# Patient Record
Sex: FEMALE | Race: BLACK OR AFRICAN AMERICAN | NOT HISPANIC OR LATINO | URBAN - METROPOLITAN AREA
[De-identification: names, ages, dates, MRNs, and addresses within clinical notes are randomized per-mention and may not be internally consistent; named-entity substitution may affect disease eponyms.]

---

## 2017-01-15 ENCOUNTER — INPATIENT (INPATIENT)
Facility: HOSPITAL | Age: 82
LOS: 4 days | Discharge: NURSING, REHAB OR LONG-TERM CARE | End: 2017-01-20
Attending: INTERNAL MEDICINE | Admitting: INTERNAL MEDICINE
Payer: MEDICARE

## 2017-01-15 VITALS
OXYGEN SATURATION: 100 % | TEMPERATURE: 98 F | SYSTOLIC BLOOD PRESSURE: 177 MMHG | HEART RATE: 98 BPM | DIASTOLIC BLOOD PRESSURE: 100 MMHG | RESPIRATION RATE: 16 BRPM

## 2017-01-15 DIAGNOSIS — I15.9 SECONDARY HYPERTENSION, UNSPECIFIED: ICD-10-CM

## 2017-01-15 DIAGNOSIS — Z41.8 ENCOUNTER FOR OTHER PROCEDURES FOR PURPOSES OTHER THAN REMEDYING HEALTH STATE: ICD-10-CM

## 2017-01-15 DIAGNOSIS — E11.9 TYPE 2 DIABETES MELLITUS WITHOUT COMPLICATIONS: ICD-10-CM

## 2017-01-15 DIAGNOSIS — N39.0 URINARY TRACT INFECTION, SITE NOT SPECIFIED: ICD-10-CM

## 2017-01-15 DIAGNOSIS — J40 BRONCHITIS, NOT SPECIFIED AS ACUTE OR CHRONIC: ICD-10-CM
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
## 2017-01-15 DIAGNOSIS — I10 ESSENTIAL (PRIMARY) HYPERTENSION: ICD-10-CM

## 2017-01-15 LAB
ALBUMIN SERPL ELPH-MCNC: 4.3 G/DL — SIGNIFICANT CHANGE UP (ref 3.3–5)
ALP SERPL-CCNC: 75 U/L — SIGNIFICANT CHANGE UP (ref 40–120)
ALT FLD-CCNC: 15 U/L — SIGNIFICANT CHANGE UP (ref 4–33)
APPEARANCE UR: CLEAR — SIGNIFICANT CHANGE UP
APTT BLD: 29.2 SEC — SIGNIFICANT CHANGE UP (ref 27.5–37.4)
AST SERPL-CCNC: 25 U/L — SIGNIFICANT CHANGE UP (ref 4–32)
B PERT DNA SPEC QL NAA+PROBE: SIGNIFICANT CHANGE UP
BASE EXCESS BLDV CALC-SCNC: -0.7 MMOL/L — SIGNIFICANT CHANGE UP
BASE EXCESS BLDV CALC-SCNC: 1.3 MMOL/L — SIGNIFICANT CHANGE UP
BASOPHILS # BLD AUTO: 0.04 K/UL — SIGNIFICANT CHANGE UP (ref 0–0.2)
BASOPHILS NFR BLD AUTO: 0.4 % — SIGNIFICANT CHANGE UP (ref 0–2)
BILIRUB SERPL-MCNC: 0.9 MG/DL — SIGNIFICANT CHANGE UP (ref 0.2–1.2)
BILIRUB UR-MCNC: NEGATIVE — SIGNIFICANT CHANGE UP
BLOOD GAS VENOUS - CREATININE: 0.95 MG/DL — SIGNIFICANT CHANGE UP (ref 0.5–1.3)
BLOOD GAS VENOUS - CREATININE: 1.04 MG/DL — SIGNIFICANT CHANGE UP (ref 0.5–1.3)
BLOOD UR QL VISUAL: NEGATIVE — SIGNIFICANT CHANGE UP
BUN SERPL-MCNC: 11 MG/DL — SIGNIFICANT CHANGE UP (ref 7–23)
C PNEUM DNA SPEC QL NAA+PROBE: NOT DETECTED — SIGNIFICANT CHANGE UP
CALCIUM SERPL-MCNC: 9.8 MG/DL — SIGNIFICANT CHANGE UP (ref 8.4–10.5)
CHLORIDE BLDV-SCNC: 102 MMOL/L — SIGNIFICANT CHANGE UP (ref 96–108)
CHLORIDE BLDV-SCNC: 106 MMOL/L — SIGNIFICANT CHANGE UP (ref 96–108)
CHLORIDE SERPL-SCNC: 98 MMOL/L — SIGNIFICANT CHANGE UP (ref 98–107)
CK MB BLD-MCNC: 1.77 NG/ML — SIGNIFICANT CHANGE UP (ref 1–4.7)
CK SERPL-CCNC: 201 U/L — HIGH (ref 25–170)
CO2 SERPL-SCNC: 24 MMOL/L — SIGNIFICANT CHANGE UP (ref 22–31)
COLOR SPEC: SIGNIFICANT CHANGE UP
CREAT SERPL-MCNC: 1.14 MG/DL — SIGNIFICANT CHANGE UP (ref 0.5–1.3)
EOSINOPHIL # BLD AUTO: 0.03 K/UL — SIGNIFICANT CHANGE UP (ref 0–0.5)
EOSINOPHIL NFR BLD AUTO: 0.3 % — SIGNIFICANT CHANGE UP (ref 0–6)
FLUAV H1 2009 PAND RNA SPEC QL NAA+PROBE: NOT DETECTED — SIGNIFICANT CHANGE UP
FLUAV H1 RNA SPEC QL NAA+PROBE: NOT DETECTED — SIGNIFICANT CHANGE UP
FLUAV H3 RNA SPEC QL NAA+PROBE: NOT DETECTED — SIGNIFICANT CHANGE UP
FLUAV SUBTYP SPEC NAA+PROBE: SIGNIFICANT CHANGE UP
FLUBV RNA SPEC QL NAA+PROBE: NOT DETECTED — SIGNIFICANT CHANGE UP
GAS PNL BLDV: 138 MMOL/L — SIGNIFICANT CHANGE UP (ref 136–146)
GAS PNL BLDV: 139 MMOL/L — SIGNIFICANT CHANGE UP (ref 136–146)
GLUCOSE BLDV-MCNC: 155 — HIGH (ref 70–99)
GLUCOSE BLDV-MCNC: 177 — HIGH (ref 70–99)
GLUCOSE SERPL-MCNC: 180 MG/DL — HIGH (ref 70–99)
GLUCOSE UR-MCNC: NEGATIVE — SIGNIFICANT CHANGE UP
HADV DNA SPEC QL NAA+PROBE: NOT DETECTED — SIGNIFICANT CHANGE UP
HCO3 BLDV-SCNC: 22 MMOL/L — SIGNIFICANT CHANGE UP (ref 20–27)
HCO3 BLDV-SCNC: 24 MMOL/L — SIGNIFICANT CHANGE UP (ref 20–27)
HCOV 229E RNA SPEC QL NAA+PROBE: NOT DETECTED — SIGNIFICANT CHANGE UP
HCOV HKU1 RNA SPEC QL NAA+PROBE: NOT DETECTED — SIGNIFICANT CHANGE UP
HCOV NL63 RNA SPEC QL NAA+PROBE: NOT DETECTED — SIGNIFICANT CHANGE UP
HCOV OC43 RNA SPEC QL NAA+PROBE: NOT DETECTED — SIGNIFICANT CHANGE UP
HCT VFR BLD CALC: 34.7 % — SIGNIFICANT CHANGE UP (ref 34.5–45)
HCT VFR BLDV CALC: 33.9 % — LOW (ref 34.5–45)
HCT VFR BLDV CALC: 35.4 % — SIGNIFICANT CHANGE UP (ref 34.5–45)
HGB BLD-MCNC: 11.2 G/DL — LOW (ref 11.5–15.5)
HGB BLDV-MCNC: 11 G/DL — LOW (ref 11.5–15.5)
HGB BLDV-MCNC: 11.5 G/DL — SIGNIFICANT CHANGE UP (ref 11.5–15.5)
HMPV RNA SPEC QL NAA+PROBE: NOT DETECTED — SIGNIFICANT CHANGE UP
HPIV1 RNA SPEC QL NAA+PROBE: NOT DETECTED — SIGNIFICANT CHANGE UP
HPIV2 RNA SPEC QL NAA+PROBE: NOT DETECTED — SIGNIFICANT CHANGE UP
HPIV3 RNA SPEC QL NAA+PROBE: NOT DETECTED — SIGNIFICANT CHANGE UP
HPIV4 RNA SPEC QL NAA+PROBE: NOT DETECTED — SIGNIFICANT CHANGE UP
IMM GRANULOCYTES NFR BLD AUTO: 0.2 % — SIGNIFICANT CHANGE UP (ref 0–1.5)
INR BLD: 1.14 — SIGNIFICANT CHANGE UP (ref 0.87–1.18)
KETONES UR-MCNC: NEGATIVE — SIGNIFICANT CHANGE UP
LACTATE BLDV-MCNC: 2.8 MMOL/L — HIGH (ref 0.5–2)
LACTATE BLDV-MCNC: 3.2 MMOL/L — HIGH (ref 0.5–2)
LEUKOCYTE ESTERASE UR-ACNC: HIGH
LYMPHOCYTES # BLD AUTO: 1.61 K/UL — SIGNIFICANT CHANGE UP (ref 1–3.3)
LYMPHOCYTES # BLD AUTO: 15.4 % — SIGNIFICANT CHANGE UP (ref 13–44)
M PNEUMO DNA SPEC QL NAA+PROBE: NOT DETECTED — SIGNIFICANT CHANGE UP
MCHC RBC-ENTMCNC: 32 PG — SIGNIFICANT CHANGE UP (ref 27–34)
MCHC RBC-ENTMCNC: 32.3 % — SIGNIFICANT CHANGE UP (ref 32–36)
MCV RBC AUTO: 99.1 FL — SIGNIFICANT CHANGE UP (ref 80–100)
MONOCYTES # BLD AUTO: 0.53 K/UL — SIGNIFICANT CHANGE UP (ref 0–0.9)
MONOCYTES NFR BLD AUTO: 5.1 % — SIGNIFICANT CHANGE UP (ref 2–14)
NEUTROPHILS # BLD AUTO: 8.22 K/UL — HIGH (ref 1.8–7.4)
NEUTROPHILS NFR BLD AUTO: 78.6 % — HIGH (ref 43–77)
NITRITE UR-MCNC: NEGATIVE — SIGNIFICANT CHANGE UP
PCO2 BLDV: 49 MMHG — SIGNIFICANT CHANGE UP (ref 41–51)
PCO2 BLDV: 51 MMHG — SIGNIFICANT CHANGE UP (ref 41–51)
PH BLDV: 7.31 PH — LOW (ref 7.32–7.43)
PH BLDV: 7.35 PH — SIGNIFICANT CHANGE UP (ref 7.32–7.43)
PH UR: 6.5 — SIGNIFICANT CHANGE UP (ref 4.6–8)
PLATELET # BLD AUTO: 207 K/UL — SIGNIFICANT CHANGE UP (ref 150–400)
PMV BLD: 11.5 FL — SIGNIFICANT CHANGE UP (ref 7–13)
PO2 BLDV: < 24 MMHG — LOW (ref 35–40)
PO2 BLDV: < 24 MMHG — LOW (ref 35–40)
POTASSIUM BLDV-SCNC: 3.5 MMOL/L — SIGNIFICANT CHANGE UP (ref 3.4–4.5)
POTASSIUM BLDV-SCNC: 3.7 MMOL/L — SIGNIFICANT CHANGE UP (ref 3.4–4.5)
POTASSIUM SERPL-MCNC: 3.6 MMOL/L — SIGNIFICANT CHANGE UP (ref 3.5–5.3)
POTASSIUM SERPL-SCNC: 3.6 MMOL/L — SIGNIFICANT CHANGE UP (ref 3.5–5.3)
PROT SERPL-MCNC: 8.2 G/DL — SIGNIFICANT CHANGE UP (ref 6–8.3)
PROT UR-MCNC: 10 — SIGNIFICANT CHANGE UP
PROTHROM AB SERPL-ACNC: 13 SEC — SIGNIFICANT CHANGE UP (ref 10–13.1)
RBC # BLD: 3.5 M/UL — LOW (ref 3.8–5.2)
RBC # FLD: 12.7 % — SIGNIFICANT CHANGE UP (ref 10.3–14.5)
RSV RNA SPEC QL NAA+PROBE: POSITIVE — HIGH
RV+EV RNA SPEC QL NAA+PROBE: NOT DETECTED — SIGNIFICANT CHANGE UP
SAO2 % BLDV: 18.6 % — LOW (ref 60–85)
SAO2 % BLDV: 27.3 % — LOW (ref 60–85)
SODIUM SERPL-SCNC: 138 MMOL/L — SIGNIFICANT CHANGE UP (ref 135–145)
SP GR SPEC: 1.01 — SIGNIFICANT CHANGE UP (ref 1–1.03)
SQUAMOUS # UR AUTO: SIGNIFICANT CHANGE UP
TROPONIN T SERPL-MCNC: < 0.06 NG/ML — SIGNIFICANT CHANGE UP (ref 0–0.06)
UROBILINOGEN FLD QL: NORMAL E.U. — SIGNIFICANT CHANGE UP (ref 0.1–0.2)
WBC # BLD: 10.45 K/UL — SIGNIFICANT CHANGE UP (ref 3.8–10.5)
WBC # FLD AUTO: 10.45 K/UL — SIGNIFICANT CHANGE UP (ref 3.8–10.5)
WBC UR QL: SIGNIFICANT CHANGE UP (ref 0–?)

## 2017-01-15 PROCEDURE — 70450 CT HEAD/BRAIN W/O DYE: CPT | Mod: 26

## 2017-01-15 PROCEDURE — 72125 CT NECK SPINE W/O DYE: CPT | Mod: 26

## 2017-01-15 PROCEDURE — 99223 1ST HOSP IP/OBS HIGH 75: CPT | Mod: GC

## 2017-01-15 PROCEDURE — 71010: CPT | Mod: 26

## 2017-01-15 PROCEDURE — 73502 X-RAY EXAM HIP UNI 2-3 VIEWS: CPT | Mod: 26,LT

## 2017-01-15 RX ORDER — SODIUM CHLORIDE 9 MG/ML
1000 INJECTION INTRAMUSCULAR; INTRAVENOUS; SUBCUTANEOUS ONCE
Qty: 0 | Refills: 0 | Status: DISCONTINUED | OUTPATIENT
Start: 2017-01-15 | End: 2017-01-15

## 2017-01-15 RX ORDER — ACETAMINOPHEN 500 MG
650 TABLET ORAL EVERY 6 HOURS
Qty: 0 | Refills: 0 | Status: DISCONTINUED | OUTPATIENT
Start: 2017-01-15 | End: 2017-01-20

## 2017-01-15 RX ORDER — ACETAMINOPHEN 500 MG
650 TABLET ORAL ONCE
Qty: 0 | Refills: 0 | Status: COMPLETED | OUTPATIENT
Start: 2017-01-15 | End: 2017-01-15

## 2017-01-15 RX ORDER — SODIUM CHLORIDE 9 MG/ML
1000 INJECTION INTRAMUSCULAR; INTRAVENOUS; SUBCUTANEOUS ONCE
Qty: 0 | Refills: 0 | Status: COMPLETED | OUTPATIENT
Start: 2017-01-15 | End: 2017-01-15

## 2017-01-15 RX ORDER — DEXTROSE 50 % IN WATER 50 %
12.5 SYRINGE (ML) INTRAVENOUS ONCE
Qty: 0 | Refills: 0 | Status: DISCONTINUED | OUTPATIENT
Start: 2017-01-15 | End: 2017-01-20

## 2017-01-15 RX ORDER — HEPARIN SODIUM 5000 [USP'U]/ML
5000 INJECTION INTRAVENOUS; SUBCUTANEOUS EVERY 8 HOURS
Qty: 0 | Refills: 0 | Status: DISCONTINUED | OUTPATIENT
Start: 2017-01-15 | End: 2017-01-20

## 2017-01-15 RX ORDER — DEXTROSE 50 % IN WATER 50 %
25 SYRINGE (ML) INTRAVENOUS ONCE
Qty: 0 | Refills: 0 | Status: DISCONTINUED | OUTPATIENT
Start: 2017-01-15 | End: 2017-01-20

## 2017-01-15 RX ORDER — INSULIN LISPRO 100/ML
VIAL (ML) SUBCUTANEOUS AT BEDTIME
Qty: 0 | Refills: 0 | Status: DISCONTINUED | OUTPATIENT
Start: 2017-01-15 | End: 2017-01-20

## 2017-01-15 RX ORDER — ACETAMINOPHEN 500 MG
650 TABLET ORAL ONCE
Qty: 0 | Refills: 0 | Status: DISCONTINUED | OUTPATIENT
Start: 2017-01-15 | End: 2017-01-15

## 2017-01-15 RX ORDER — LOSARTAN POTASSIUM 100 MG/1
50 TABLET, FILM COATED ORAL DAILY
Qty: 0 | Refills: 0 | Status: DISCONTINUED | OUTPATIENT
Start: 2017-01-15 | End: 2017-01-20

## 2017-01-15 RX ORDER — SODIUM CHLORIDE 9 MG/ML
1000 INJECTION, SOLUTION INTRAVENOUS
Qty: 0 | Refills: 0 | Status: DISCONTINUED | OUTPATIENT
Start: 2017-01-15 | End: 2017-01-20

## 2017-01-15 RX ORDER — AMLODIPINE BESYLATE 2.5 MG/1
5 TABLET ORAL DAILY
Qty: 0 | Refills: 0 | Status: DISCONTINUED | OUTPATIENT
Start: 2017-01-15 | End: 2017-01-20

## 2017-01-15 RX ORDER — CEFTRIAXONE 500 MG/1
1 INJECTION, POWDER, FOR SOLUTION INTRAMUSCULAR; INTRAVENOUS EVERY 24 HOURS
Qty: 0 | Refills: 0 | Status: DISCONTINUED | OUTPATIENT
Start: 2017-01-16 | End: 2017-01-18

## 2017-01-15 RX ORDER — CEFTRIAXONE 500 MG/1
INJECTION, POWDER, FOR SOLUTION INTRAMUSCULAR; INTRAVENOUS
Qty: 0 | Refills: 0 | Status: DISCONTINUED | OUTPATIENT
Start: 2017-01-15 | End: 2017-01-15

## 2017-01-15 RX ORDER — CEFTRIAXONE 500 MG/1
1 INJECTION, POWDER, FOR SOLUTION INTRAMUSCULAR; INTRAVENOUS ONCE
Qty: 0 | Refills: 0 | Status: COMPLETED | OUTPATIENT
Start: 2017-01-15 | End: 2017-01-15

## 2017-01-15 RX ORDER — DEXTROSE 50 % IN WATER 50 %
1 SYRINGE (ML) INTRAVENOUS ONCE
Qty: 0 | Refills: 0 | Status: DISCONTINUED | OUTPATIENT
Start: 2017-01-15 | End: 2017-01-20

## 2017-01-15 RX ORDER — GLUCAGON INJECTION, SOLUTION 0.5 MG/.1ML
1 INJECTION, SOLUTION SUBCUTANEOUS ONCE
Qty: 0 | Refills: 0 | Status: DISCONTINUED | OUTPATIENT
Start: 2017-01-15 | End: 2017-01-20

## 2017-01-15 RX ORDER — INSULIN LISPRO 100/ML
VIAL (ML) SUBCUTANEOUS
Qty: 0 | Refills: 0 | Status: DISCONTINUED | OUTPATIENT
Start: 2017-01-15 | End: 2017-01-20

## 2017-01-15 RX ADMIN — CEFTRIAXONE 100 GRAM(S): 500 INJECTION, POWDER, FOR SOLUTION INTRAMUSCULAR; INTRAVENOUS at 10:18

## 2017-01-15 RX ADMIN — SODIUM CHLORIDE 1000 MILLILITER(S): 9 INJECTION INTRAMUSCULAR; INTRAVENOUS; SUBCUTANEOUS at 11:22

## 2017-01-15 RX ADMIN — Medication 650 MILLIGRAM(S): at 08:57

## 2017-01-15 RX ADMIN — Medication 650 MILLIGRAM(S): at 15:59

## 2017-01-15 RX ADMIN — HEPARIN SODIUM 5000 UNIT(S): 5000 INJECTION INTRAVENOUS; SUBCUTANEOUS at 23:55

## 2017-01-15 RX ADMIN — SODIUM CHLORIDE 1000 MILLILITER(S): 9 INJECTION INTRAMUSCULAR; INTRAVENOUS; SUBCUTANEOUS at 08:57

## 2017-01-15 RX ADMIN — SODIUM CHLORIDE 1000 MILLILITER(S): 9 INJECTION INTRAMUSCULAR; INTRAVENOUS; SUBCUTANEOUS at 17:08

## 2017-01-15 NOTE — H&P ADULT. - RS GEN PE MLT RESP DETAILS PC
clear to auscultation bilaterally/diminished breath sounds, L/diminished breath sounds, R/diminished sounds from back, clear lung sounds from anterior

## 2017-01-15 NOTE — H&P ADULT. - ATTENDING COMMENTS
89 yo F w/ hx DM, HTN p/w ? syncopal episode in setting of URI symptoms. Pt poor hx despite being spoken to with creole staff. +RVP-RSV CXR no acute infiltrate. UA+ incontinent of urine. TM-102.4 lactate 3.2 despite 3 L IVF +lethargic but arousable to voice know she is in the hospital and responds to questions approriately. CT c spine Head xray pelvis neg   Syncopy-tele monitor for arrhythmia               -check orthostatics               -Hip pain without obvious signs of trauma and x rays neg                -TTE- eval EF and valves   UTI- cont ceftriaxone        -cont IVF and repeat lactate if repeat elevated would consider CT A/P -r/o abdominal source for lactate       -TTE evaluate EF as being given IVF   URI-with lethargy- check ABG ensure pt not retaining       -symptomatic management unable to reach family in regards to code status  HTN-cont antiHTN-sive for now with hold parameters  DM-ISS/HgbA1c

## 2017-01-15 NOTE — ED PROVIDER NOTE - PMH
Chronic low back pain    DM (diabetes mellitus)    DM (diabetes mellitus)    HTN (hypertension)    HTN (hypertension)    Shingles

## 2017-01-15 NOTE — ED PROVIDER NOTE - MEDICAL DECISION MAKING DETAILS
89 y/o F with h/o DM, HTN, AAOx1 presents with reported witnessed syncope at home. Unable to provide history. Unsure of baseline mental status. Febrile and tachycardic. Concern for infectious process vs trauma vs syncope. CT head, CT c-spine, CXR, Pelvis xray to evaluate for injuries. Eran, cardiac monitoring and EKG for reported syncope. Tylenol, IVF while awaiting labs and possible infectious source

## 2017-01-15 NOTE — H&P ADULT. - EKG AND INTERPRETATION
sinus tachycardia to 105, normal axis, T wave invensions in V1-V3, V5-V6. Poor R wave progression in V2 and V3 which may be switched.

## 2017-01-15 NOTE — ED PROVIDER NOTE - OBJECTIVE STATEMENT
History obtained using Creole  #[id number]. 89 y/o F with h/o DM, HTN presents after witnessed syncopal episode at home and fall. Patient does not have recollection of events today and believes she is in her home in bed. States that she has pain on her left hip. Otherwise patient unable to provide history.

## 2017-01-15 NOTE — H&P ADULT. - RADIOLOGY RESULTS AND INTERPRETATION
CT head: no acute pathology  Xray Chest: clear lungs  Xray pelvis: CT head: no acute pathology  Xray Chest: clear lungs  Xray pelvis: no fracture

## 2017-01-15 NOTE — ED ADULT NURSE NOTE - OBJECTIVE STATEMENT
pt received to room 18, AAOx2, primarily Creole speaking, pt brought to ED s/p fall, pt noted with a dry cough, febrile and tachycardic, 20 G IV placed to rt. AC, labs sent, VS as noted, pt in NAD, pending further MD eval/orders, will continue to monitor pt. pt received to room 18, AAOx2, primarily Creole speaking, pt brought to ED s/p fall, pt noted with a dry cough, febrile and tachycardic, 20 G IV placed to rt. AC, labs sent, VS as noted, pt in NAD, skin warm, dry and intact, pt changed and repositioned for comfort, pending further MD eval/orders, will continue to monitor pt.

## 2017-01-15 NOTE — H&P ADULT. - PROBLEM SELECTOR PLAN 3
- repeat pelvic imaging if concern for pelvic/hip fracture. At the moment, exam and imaging not remarkable

## 2017-01-15 NOTE — ED PROVIDER NOTE - CARE PLAN
Principal Discharge DX:	Urinary tract infection Principal Discharge DX:	Urinary tract infection  Secondary Diagnosis:	DM (diabetes mellitus)  Secondary Diagnosis:	Fall, initial encounter

## 2017-01-15 NOTE — ED PROVIDER NOTE - ATTENDING CONTRIBUTION TO CARE
89F h/o HTN, DM presents by self from home for reported fall, syncope, fever. Patient is AOx1, unknown baseline, cooperative. Does not remember falling this morning. Endorses fatigue, dry cough, left hip pain.    Patient is well appearing, conversant, cooperative, smiling, head atraumatic, neck supple with full range of motion, oropharynx clear, lungs clear, no crackles or rales, speaking full sentences, heart clear, no murmurs, distal pulses equal in all 4 extremities, ranging all extremities, abdomen soft nontender nondistended with no masses, no leg edema, no calf tenderness, nonfocal neurologic exam.    Concern for infection given fever. CXR, UA, RVP, CBC, lactate to eval for infectious sources. Syncope workup including head/neck imaging for fall. IVF for hydration. Consider admission for further workup. No signs of sepsis or shock at this time.    Patient with UTI, treat with antibiotics. Imaging with no acute findings. Admit to medicine.

## 2017-01-15 NOTE — ED ADULT TRIAGE NOTE - CHIEF COMPLAINT QUOTE
alert oriented x 2 creole speaking  s/p  witnessed fall ( syncope?)fell while getting off bed to use commode  states she was dizzy prior to fall  c/o right rib pain  hx HTN DM

## 2017-01-15 NOTE — H&P ADULT. - ASSESSMENT
89F hx DM, HTN presents to the ED after triage note indicates witnessed syncopal episode at home and fall. Has cough w/ nasal discharge and chest congestion along with (+) RVP consistent with viral bronchitis with clear lungs on CXR. Has

## 2017-01-15 NOTE — H&P ADULT. - PROBLEM SELECTOR PLAN 1
- supportive treatment; Robitussin DM for cough  - monitor O2 sats and give supplemental O2 via NC for O2sat < 92%

## 2017-01-15 NOTE — H&P ADULT. - LAB RESULTS AND INTERPRETATION
VBG showing acidosis w/ elevated lactate. UA positive for leukocyte esterase. WBC borderline elevated with mild neutrophil predominance but no left shift. RVP positive indicating viral URI. Elevated CK which may indicate post-fall muscle injury.

## 2017-01-15 NOTE — H&P ADULT. - MENTAL STATUS
alert and oriented to self only. Thinks she is at home and that the year is 2000. Follows only some commands after multiple prompts. Appears lethargic but wakes to voice.

## 2017-01-15 NOTE — H&P ADULT. - PROBLEM SELECTOR PLAN 2
Seemingly asymptomatic but has fevers, elevated lactate, and provides unreliable history  - preemptive ceftriaxone 1 g daily x 3 days  - f/u blood and urine cultures  - monitor WBC and vitals  - check ABG to monitor lactate  - 1 L NS bolus and echo to monitor fluid status

## 2017-01-15 NOTE — H&P ADULT. - HISTORY OF PRESENT ILLNESS
History obtained through [place] Creole-speaking PCA Canon Porras.    89F hx DM, HTN presents to the ED after triage note indicates witnessed syncopal episode at home and fall. Patient does not have recollection of events today and believes she is in her home in bed. Complains of some pain in RLQ pain and groin area. Denies any sick contacts. Otherwise patient unable to provide reliable history. History obtained through [place] Creole-speaking PCA Canon Porras.    89F hx DM, HTN presents to the ED after triage note indicates witnessed syncopal episode at home and fall. Patient does not have recollection of events today and believes she is in her home in bed. Complains of some pain in RLQ pain and groin area. Denies any sick contacts. Otherwise patient unable to provide reliable history. Attempted to contact son and family with contact numbers in chart but unable to reach them.

## 2017-01-16 LAB
BACTERIA UR CULT: SIGNIFICANT CHANGE UP
BASOPHILS # BLD AUTO: 0.03 K/UL — SIGNIFICANT CHANGE UP (ref 0–0.2)
BASOPHILS NFR BLD AUTO: 0.2 % — SIGNIFICANT CHANGE UP (ref 0–2)
BUN SERPL-MCNC: 8 MG/DL — SIGNIFICANT CHANGE UP (ref 7–23)
CALCIUM SERPL-MCNC: 9.1 MG/DL — SIGNIFICANT CHANGE UP (ref 8.4–10.5)
CHLORIDE SERPL-SCNC: 99 MMOL/L — SIGNIFICANT CHANGE UP (ref 98–107)
CK MB BLD-MCNC: 0.5 — SIGNIFICANT CHANGE UP (ref 0–2.5)
CK MB BLD-MCNC: 2.96 NG/ML — SIGNIFICANT CHANGE UP (ref 1–4.7)
CK MB BLD-MCNC: 3.01 NG/ML — SIGNIFICANT CHANGE UP (ref 1–4.7)
CK SERPL-CCNC: 391 U/L — HIGH (ref 25–170)
CK SERPL-CCNC: 546 U/L — HIGH (ref 25–170)
CO2 SERPL-SCNC: 24 MMOL/L — SIGNIFICANT CHANGE UP (ref 22–31)
CREAT SERPL-MCNC: 1.09 MG/DL — SIGNIFICANT CHANGE UP (ref 0.5–1.3)
EOSINOPHIL # BLD AUTO: 0 K/UL — SIGNIFICANT CHANGE UP (ref 0–0.5)
EOSINOPHIL NFR BLD AUTO: 0 % — SIGNIFICANT CHANGE UP (ref 0–6)
GLUCOSE SERPL-MCNC: 150 MG/DL — HIGH (ref 70–99)
HCT VFR BLD CALC: 34.1 % — LOW (ref 34.5–45)
HGB BLD-MCNC: 11.2 G/DL — LOW (ref 11.5–15.5)
IMM GRANULOCYTES NFR BLD AUTO: 0.3 % — SIGNIFICANT CHANGE UP (ref 0–1.5)
LYMPHOCYTES # BLD AUTO: 19.9 % — SIGNIFICANT CHANGE UP (ref 13–44)
LYMPHOCYTES # BLD AUTO: 2.98 K/UL — SIGNIFICANT CHANGE UP (ref 1–3.3)
MAGNESIUM SERPL-MCNC: 1.4 MG/DL — LOW (ref 1.6–2.6)
MCHC RBC-ENTMCNC: 32.3 PG — SIGNIFICANT CHANGE UP (ref 27–34)
MCHC RBC-ENTMCNC: 32.8 % — SIGNIFICANT CHANGE UP (ref 32–36)
MCV RBC AUTO: 98.3 FL — SIGNIFICANT CHANGE UP (ref 80–100)
MONOCYTES # BLD AUTO: 1.02 K/UL — HIGH (ref 0–0.9)
MONOCYTES NFR BLD AUTO: 6.8 % — SIGNIFICANT CHANGE UP (ref 2–14)
NEUTROPHILS # BLD AUTO: 10.91 K/UL — HIGH (ref 1.8–7.4)
NEUTROPHILS NFR BLD AUTO: 72.8 % — SIGNIFICANT CHANGE UP (ref 43–77)
PHOSPHATE SERPL-MCNC: 2.9 MG/DL — SIGNIFICANT CHANGE UP (ref 2.5–4.5)
PLATELET # BLD AUTO: 193 K/UL — SIGNIFICANT CHANGE UP (ref 150–400)
PMV BLD: 10.9 FL — SIGNIFICANT CHANGE UP (ref 7–13)
POTASSIUM SERPL-MCNC: 3.5 MMOL/L — SIGNIFICANT CHANGE UP (ref 3.5–5.3)
POTASSIUM SERPL-SCNC: 3.5 MMOL/L — SIGNIFICANT CHANGE UP (ref 3.5–5.3)
RBC # BLD: 3.47 M/UL — LOW (ref 3.8–5.2)
RBC # FLD: 12.6 % — SIGNIFICANT CHANGE UP (ref 10.3–14.5)
SODIUM SERPL-SCNC: 140 MMOL/L — SIGNIFICANT CHANGE UP (ref 135–145)
SPECIMEN SOURCE: SIGNIFICANT CHANGE UP
TROPONIN T SERPL-MCNC: < 0.06 NG/ML — SIGNIFICANT CHANGE UP (ref 0–0.06)
TROPONIN T SERPL-MCNC: < 0.06 NG/ML — SIGNIFICANT CHANGE UP (ref 0–0.06)
WBC # BLD: 14.99 K/UL — HIGH (ref 3.8–10.5)
WBC # FLD AUTO: 14.99 K/UL — HIGH (ref 3.8–10.5)

## 2017-01-16 PROCEDURE — 99233 SBSQ HOSP IP/OBS HIGH 50: CPT | Mod: GC

## 2017-01-16 RX ORDER — MAGNESIUM OXIDE 400 MG ORAL TABLET 241.3 MG
400 TABLET ORAL
Qty: 0 | Refills: 0 | Status: COMPLETED | OUTPATIENT
Start: 2017-01-16 | End: 2017-01-16

## 2017-01-16 RX ORDER — HYDRALAZINE HCL 50 MG
10 TABLET ORAL ONCE
Qty: 0 | Refills: 0 | Status: COMPLETED | OUTPATIENT
Start: 2017-01-16 | End: 2017-01-16

## 2017-01-16 RX ADMIN — HEPARIN SODIUM 5000 UNIT(S): 5000 INJECTION INTRAVENOUS; SUBCUTANEOUS at 14:04

## 2017-01-16 RX ADMIN — HEPARIN SODIUM 5000 UNIT(S): 5000 INJECTION INTRAVENOUS; SUBCUTANEOUS at 06:55

## 2017-01-16 RX ADMIN — CEFTRIAXONE 100 GRAM(S): 500 INJECTION, POWDER, FOR SOLUTION INTRAMUSCULAR; INTRAVENOUS at 12:51

## 2017-01-16 RX ADMIN — Medication: at 12:33

## 2017-01-16 RX ADMIN — Medication 10 MILLIGRAM(S): at 01:25

## 2017-01-16 RX ADMIN — AMLODIPINE BESYLATE 5 MILLIGRAM(S): 2.5 TABLET ORAL at 06:55

## 2017-01-16 RX ADMIN — HEPARIN SODIUM 5000 UNIT(S): 5000 INJECTION INTRAVENOUS; SUBCUTANEOUS at 21:55

## 2017-01-16 RX ADMIN — MAGNESIUM OXIDE 400 MG ORAL TABLET 400 MILLIGRAM(S): 241.3 TABLET ORAL at 17:35

## 2017-01-16 RX ADMIN — MAGNESIUM OXIDE 400 MG ORAL TABLET 400 MILLIGRAM(S): 241.3 TABLET ORAL at 08:16

## 2017-01-16 RX ADMIN — MAGNESIUM OXIDE 400 MG ORAL TABLET 400 MILLIGRAM(S): 241.3 TABLET ORAL at 12:51

## 2017-01-16 RX ADMIN — LOSARTAN POTASSIUM 50 MILLIGRAM(S): 100 TABLET, FILM COATED ORAL at 06:55

## 2017-01-17 LAB
BASOPHILS # BLD AUTO: 0.04 K/UL — SIGNIFICANT CHANGE UP (ref 0–0.2)
BASOPHILS NFR BLD AUTO: 0.3 % — SIGNIFICANT CHANGE UP (ref 0–2)
BUN SERPL-MCNC: 16 MG/DL — SIGNIFICANT CHANGE UP (ref 7–23)
CALCIUM SERPL-MCNC: 9.2 MG/DL — SIGNIFICANT CHANGE UP (ref 8.4–10.5)
CHLORIDE SERPL-SCNC: 105 MMOL/L — SIGNIFICANT CHANGE UP (ref 98–107)
CO2 SERPL-SCNC: 25 MMOL/L — SIGNIFICANT CHANGE UP (ref 22–31)
CREAT SERPL-MCNC: 1.18 MG/DL — SIGNIFICANT CHANGE UP (ref 0.5–1.3)
EOSINOPHIL # BLD AUTO: 0.09 K/UL — SIGNIFICANT CHANGE UP (ref 0–0.5)
EOSINOPHIL NFR BLD AUTO: 0.7 % — SIGNIFICANT CHANGE UP (ref 0–6)
GLUCOSE SERPL-MCNC: 120 MG/DL — HIGH (ref 70–99)
HCT VFR BLD CALC: 31.8 % — LOW (ref 34.5–45)
HGB BLD-MCNC: 10.4 G/DL — LOW (ref 11.5–15.5)
IMM GRANULOCYTES NFR BLD AUTO: 0.2 % — SIGNIFICANT CHANGE UP (ref 0–1.5)
LYMPHOCYTES # BLD AUTO: 4.85 K/UL — HIGH (ref 1–3.3)
LYMPHOCYTES # BLD AUTO: 40.1 % — SIGNIFICANT CHANGE UP (ref 13–44)
MAGNESIUM SERPL-MCNC: 1.7 MG/DL — SIGNIFICANT CHANGE UP (ref 1.6–2.6)
MCHC RBC-ENTMCNC: 32.3 PG — SIGNIFICANT CHANGE UP (ref 27–34)
MCHC RBC-ENTMCNC: 32.7 % — SIGNIFICANT CHANGE UP (ref 32–36)
MCV RBC AUTO: 98.8 FL — SIGNIFICANT CHANGE UP (ref 80–100)
MONOCYTES # BLD AUTO: 0.76 K/UL — SIGNIFICANT CHANGE UP (ref 0–0.9)
MONOCYTES NFR BLD AUTO: 6.3 % — SIGNIFICANT CHANGE UP (ref 2–14)
NEUTROPHILS # BLD AUTO: 6.33 K/UL — SIGNIFICANT CHANGE UP (ref 1.8–7.4)
NEUTROPHILS NFR BLD AUTO: 52.4 % — SIGNIFICANT CHANGE UP (ref 43–77)
PHOSPHATE SERPL-MCNC: 2.2 MG/DL — LOW (ref 2.5–4.5)
PLATELET # BLD AUTO: 196 K/UL — SIGNIFICANT CHANGE UP (ref 150–400)
PMV BLD: 11.1 FL — SIGNIFICANT CHANGE UP (ref 7–13)
POTASSIUM SERPL-MCNC: 3.5 MMOL/L — SIGNIFICANT CHANGE UP (ref 3.5–5.3)
POTASSIUM SERPL-SCNC: 3.5 MMOL/L — SIGNIFICANT CHANGE UP (ref 3.5–5.3)
RBC # BLD: 3.22 M/UL — LOW (ref 3.8–5.2)
RBC # FLD: 12.7 % — SIGNIFICANT CHANGE UP (ref 10.3–14.5)
SODIUM SERPL-SCNC: 145 MMOL/L — SIGNIFICANT CHANGE UP (ref 135–145)
WBC # BLD: 12.1 K/UL — HIGH (ref 3.8–10.5)
WBC # FLD AUTO: 12.1 K/UL — HIGH (ref 3.8–10.5)

## 2017-01-17 PROCEDURE — 71010: CPT | Mod: 26

## 2017-01-17 PROCEDURE — 99233 SBSQ HOSP IP/OBS HIGH 50: CPT | Mod: GC

## 2017-01-17 RX ORDER — SODIUM,POTASSIUM PHOSPHATES 278-250MG
1 POWDER IN PACKET (EA) ORAL
Qty: 0 | Refills: 0 | Status: COMPLETED | OUTPATIENT
Start: 2017-01-17 | End: 2017-01-17

## 2017-01-17 RX ORDER — IPRATROPIUM/ALBUTEROL SULFATE 18-103MCG
3 AEROSOL WITH ADAPTER (GRAM) INHALATION EVERY 6 HOURS
Qty: 0 | Refills: 0 | Status: DISCONTINUED | OUTPATIENT
Start: 2017-01-17 | End: 2017-01-20

## 2017-01-17 RX ADMIN — HEPARIN SODIUM 5000 UNIT(S): 5000 INJECTION INTRAVENOUS; SUBCUTANEOUS at 05:23

## 2017-01-17 RX ADMIN — Medication 2: at 17:42

## 2017-01-17 RX ADMIN — Medication 1 TABLET(S): at 07:58

## 2017-01-17 RX ADMIN — HEPARIN SODIUM 5000 UNIT(S): 5000 INJECTION INTRAVENOUS; SUBCUTANEOUS at 22:19

## 2017-01-17 RX ADMIN — LOSARTAN POTASSIUM 50 MILLIGRAM(S): 100 TABLET, FILM COATED ORAL at 05:23

## 2017-01-17 RX ADMIN — Medication 1 TABLET(S): at 22:19

## 2017-01-17 RX ADMIN — Medication 1 TABLET(S): at 11:18

## 2017-01-17 RX ADMIN — CEFTRIAXONE 100 GRAM(S): 500 INJECTION, POWDER, FOR SOLUTION INTRAMUSCULAR; INTRAVENOUS at 11:18

## 2017-01-17 RX ADMIN — Medication 3 MILLILITER(S): at 12:05

## 2017-01-17 RX ADMIN — Medication 1 TABLET(S): at 17:42

## 2017-01-17 RX ADMIN — Medication 2: at 13:04

## 2017-01-17 RX ADMIN — AMLODIPINE BESYLATE 5 MILLIGRAM(S): 2.5 TABLET ORAL at 05:23

## 2017-01-17 RX ADMIN — HEPARIN SODIUM 5000 UNIT(S): 5000 INJECTION INTRAVENOUS; SUBCUTANEOUS at 13:03

## 2017-01-17 RX ADMIN — Medication 3 MILLILITER(S): at 23:03

## 2017-01-18 LAB
BUN SERPL-MCNC: 17 MG/DL — SIGNIFICANT CHANGE UP (ref 7–23)
CALCIUM SERPL-MCNC: 9.2 MG/DL — SIGNIFICANT CHANGE UP (ref 8.4–10.5)
CHLORIDE SERPL-SCNC: 104 MMOL/L — SIGNIFICANT CHANGE UP (ref 98–107)
CO2 SERPL-SCNC: 22 MMOL/L — SIGNIFICANT CHANGE UP (ref 22–31)
CREAT SERPL-MCNC: 1.01 MG/DL — SIGNIFICANT CHANGE UP (ref 0.5–1.3)
GLUCOSE SERPL-MCNC: 172 MG/DL — HIGH (ref 70–99)
HCT VFR BLD CALC: 30.9 % — LOW (ref 34.5–45)
HGB BLD-MCNC: 10.3 G/DL — LOW (ref 11.5–15.5)
MAGNESIUM SERPL-MCNC: 1.8 MG/DL — SIGNIFICANT CHANGE UP (ref 1.6–2.6)
MCHC RBC-ENTMCNC: 32.2 PG — SIGNIFICANT CHANGE UP (ref 27–34)
MCHC RBC-ENTMCNC: 33.3 % — SIGNIFICANT CHANGE UP (ref 32–36)
MCV RBC AUTO: 96.6 FL — SIGNIFICANT CHANGE UP (ref 80–100)
PHOSPHATE SERPL-MCNC: 3.3 MG/DL — SIGNIFICANT CHANGE UP (ref 2.5–4.5)
PLATELET # BLD AUTO: 219 K/UL — SIGNIFICANT CHANGE UP (ref 150–400)
PMV BLD: 10.8 FL — SIGNIFICANT CHANGE UP (ref 7–13)
POTASSIUM SERPL-MCNC: 3.1 MMOL/L — LOW (ref 3.5–5.3)
POTASSIUM SERPL-SCNC: 3.1 MMOL/L — LOW (ref 3.5–5.3)
RBC # BLD: 3.2 M/UL — LOW (ref 3.8–5.2)
RBC # FLD: 12.4 % — SIGNIFICANT CHANGE UP (ref 10.3–14.5)
SODIUM SERPL-SCNC: 142 MMOL/L — SIGNIFICANT CHANGE UP (ref 135–145)
WBC # BLD: 10.94 K/UL — HIGH (ref 3.8–10.5)
WBC # FLD AUTO: 10.94 K/UL — HIGH (ref 3.8–10.5)

## 2017-01-18 PROCEDURE — 99233 SBSQ HOSP IP/OBS HIGH 50: CPT | Mod: GC

## 2017-01-18 RX ORDER — DOCUSATE SODIUM 100 MG
100 CAPSULE ORAL THREE TIMES A DAY
Qty: 0 | Refills: 0 | Status: DISCONTINUED | OUTPATIENT
Start: 2017-01-18 | End: 2017-01-20

## 2017-01-18 RX ORDER — SENNA PLUS 8.6 MG/1
2 TABLET ORAL AT BEDTIME
Qty: 0 | Refills: 0 | Status: DISCONTINUED | OUTPATIENT
Start: 2017-01-18 | End: 2017-01-20

## 2017-01-18 RX ORDER — POLYETHYLENE GLYCOL 3350 17 G/17G
17 POWDER, FOR SOLUTION ORAL DAILY
Qty: 0 | Refills: 0 | Status: DISCONTINUED | OUTPATIENT
Start: 2017-01-18 | End: 2017-01-20

## 2017-01-18 RX ORDER — POLYETHYLENE GLYCOL 3350 17 G/17G
17 POWDER, FOR SOLUTION ORAL ONCE
Qty: 0 | Refills: 0 | Status: COMPLETED | OUTPATIENT
Start: 2017-01-18 | End: 2017-01-18

## 2017-01-18 RX ORDER — ACETAMINOPHEN 500 MG
650 TABLET ORAL EVERY 6 HOURS
Qty: 0 | Refills: 0 | Status: DISCONTINUED | OUTPATIENT
Start: 2017-01-18 | End: 2017-01-20

## 2017-01-18 RX ADMIN — Medication 2: at 08:56

## 2017-01-18 RX ADMIN — Medication 3 MILLILITER(S): at 05:14

## 2017-01-18 RX ADMIN — Medication 100 MILLIGRAM(S): at 13:14

## 2017-01-18 RX ADMIN — Medication 100 MILLIGRAM(S): at 22:46

## 2017-01-18 RX ADMIN — LOSARTAN POTASSIUM 50 MILLIGRAM(S): 100 TABLET, FILM COATED ORAL at 05:54

## 2017-01-18 RX ADMIN — HEPARIN SODIUM 5000 UNIT(S): 5000 INJECTION INTRAVENOUS; SUBCUTANEOUS at 13:01

## 2017-01-18 RX ADMIN — SENNA PLUS 2 TABLET(S): 8.6 TABLET ORAL at 22:46

## 2017-01-18 RX ADMIN — Medication 3 MILLILITER(S): at 22:12

## 2017-01-18 RX ADMIN — HEPARIN SODIUM 5000 UNIT(S): 5000 INJECTION INTRAVENOUS; SUBCUTANEOUS at 22:46

## 2017-01-18 RX ADMIN — Medication 3 MILLILITER(S): at 16:26

## 2017-01-18 RX ADMIN — Medication 2: at 13:01

## 2017-01-18 RX ADMIN — Medication 3 MILLILITER(S): at 10:56

## 2017-01-18 RX ADMIN — HEPARIN SODIUM 5000 UNIT(S): 5000 INJECTION INTRAVENOUS; SUBCUTANEOUS at 05:54

## 2017-01-18 RX ADMIN — POLYETHYLENE GLYCOL 3350 17 GRAM(S): 17 POWDER, FOR SOLUTION ORAL at 13:08

## 2017-01-18 RX ADMIN — AMLODIPINE BESYLATE 5 MILLIGRAM(S): 2.5 TABLET ORAL at 05:54

## 2017-01-19 LAB
BUN SERPL-MCNC: 18 MG/DL — SIGNIFICANT CHANGE UP (ref 7–23)
CALCIUM SERPL-MCNC: 9.6 MG/DL — SIGNIFICANT CHANGE UP (ref 8.4–10.5)
CHLORIDE SERPL-SCNC: 104 MMOL/L — SIGNIFICANT CHANGE UP (ref 98–107)
CO2 SERPL-SCNC: 24 MMOL/L — SIGNIFICANT CHANGE UP (ref 22–31)
CREAT SERPL-MCNC: 1.1 MG/DL — SIGNIFICANT CHANGE UP (ref 0.5–1.3)
GLUCOSE SERPL-MCNC: 167 MG/DL — HIGH (ref 70–99)
HCT VFR BLD CALC: 30.4 % — LOW (ref 34.5–45)
HGB BLD-MCNC: 9.9 G/DL — LOW (ref 11.5–15.5)
MAGNESIUM SERPL-MCNC: 1.9 MG/DL — SIGNIFICANT CHANGE UP (ref 1.6–2.6)
MCHC RBC-ENTMCNC: 31.7 PG — SIGNIFICANT CHANGE UP (ref 27–34)
MCHC RBC-ENTMCNC: 32.6 % — SIGNIFICANT CHANGE UP (ref 32–36)
MCV RBC AUTO: 97.4 FL — SIGNIFICANT CHANGE UP (ref 80–100)
PHOSPHATE SERPL-MCNC: 3.2 MG/DL — SIGNIFICANT CHANGE UP (ref 2.5–4.5)
PLATELET # BLD AUTO: 249 K/UL — SIGNIFICANT CHANGE UP (ref 150–400)
PMV BLD: 10.7 FL — SIGNIFICANT CHANGE UP (ref 7–13)
POTASSIUM SERPL-MCNC: 3 MMOL/L — LOW (ref 3.5–5.3)
POTASSIUM SERPL-SCNC: 3 MMOL/L — LOW (ref 3.5–5.3)
RBC # BLD: 3.12 M/UL — LOW (ref 3.8–5.2)
RBC # FLD: 12.9 % — SIGNIFICANT CHANGE UP (ref 10.3–14.5)
SODIUM SERPL-SCNC: 146 MMOL/L — HIGH (ref 135–145)
WBC # BLD: 9.64 K/UL — SIGNIFICANT CHANGE UP (ref 3.8–10.5)
WBC # FLD AUTO: 9.64 K/UL — SIGNIFICANT CHANGE UP (ref 3.8–10.5)

## 2017-01-19 PROCEDURE — 99232 SBSQ HOSP IP/OBS MODERATE 35: CPT | Mod: GC

## 2017-01-19 RX ORDER — SODIUM CHLORIDE 9 MG/ML
500 INJECTION, SOLUTION INTRAVENOUS
Qty: 0 | Refills: 0 | Status: COMPLETED | OUTPATIENT
Start: 2017-01-19 | End: 2017-01-19

## 2017-01-19 RX ORDER — POTASSIUM CHLORIDE 20 MEQ
10 PACKET (EA) ORAL
Qty: 0 | Refills: 0 | Status: COMPLETED | OUTPATIENT
Start: 2017-01-19 | End: 2017-01-19

## 2017-01-19 RX ADMIN — Medication 3 MILLILITER(S): at 04:07

## 2017-01-19 RX ADMIN — Medication 100 MILLIGRAM(S): at 13:46

## 2017-01-19 RX ADMIN — HEPARIN SODIUM 5000 UNIT(S): 5000 INJECTION INTRAVENOUS; SUBCUTANEOUS at 13:46

## 2017-01-19 RX ADMIN — Medication 3 MILLILITER(S): at 15:53

## 2017-01-19 RX ADMIN — SENNA PLUS 2 TABLET(S): 8.6 TABLET ORAL at 23:16

## 2017-01-19 RX ADMIN — HEPARIN SODIUM 5000 UNIT(S): 5000 INJECTION INTRAVENOUS; SUBCUTANEOUS at 23:16

## 2017-01-19 RX ADMIN — Medication 3 MILLILITER(S): at 10:23

## 2017-01-19 RX ADMIN — Medication 100 MILLIEQUIVALENT(S): at 11:20

## 2017-01-19 RX ADMIN — LOSARTAN POTASSIUM 50 MILLIGRAM(S): 100 TABLET, FILM COATED ORAL at 05:27

## 2017-01-19 RX ADMIN — Medication 3 MILLILITER(S): at 22:46

## 2017-01-19 RX ADMIN — SODIUM CHLORIDE 20 MILLILITER(S): 9 INJECTION, SOLUTION INTRAVENOUS at 09:30

## 2017-01-19 RX ADMIN — AMLODIPINE BESYLATE 5 MILLIGRAM(S): 2.5 TABLET ORAL at 05:27

## 2017-01-19 RX ADMIN — HEPARIN SODIUM 5000 UNIT(S): 5000 INJECTION INTRAVENOUS; SUBCUTANEOUS at 05:27

## 2017-01-19 RX ADMIN — POLYETHYLENE GLYCOL 3350 17 GRAM(S): 17 POWDER, FOR SOLUTION ORAL at 13:46

## 2017-01-19 RX ADMIN — Medication 100 MILLIGRAM(S): at 05:29

## 2017-01-19 RX ADMIN — Medication 4: at 13:08

## 2017-01-19 RX ADMIN — Medication 100 MILLIEQUIVALENT(S): at 10:20

## 2017-01-19 RX ADMIN — Medication 100 MILLIEQUIVALENT(S): at 09:17

## 2017-01-19 RX ADMIN — Medication 100 MILLIGRAM(S): at 23:16

## 2017-01-20 VITALS — OXYGEN SATURATION: 97 %

## 2017-01-20 LAB
ANISOCYTOSIS BLD QL: SLIGHT — SIGNIFICANT CHANGE UP
BACTERIA BLD CULT: SIGNIFICANT CHANGE UP
BACTERIA BLD CULT: SIGNIFICANT CHANGE UP
BASOPHILS # BLD AUTO: 0.04 K/UL — SIGNIFICANT CHANGE UP (ref 0–0.2)
BASOPHILS NFR BLD AUTO: 0.4 % — SIGNIFICANT CHANGE UP (ref 0–2)
BASOPHILS NFR SPEC: 2 % — SIGNIFICANT CHANGE UP (ref 0–2)
BUN SERPL-MCNC: 12 MG/DL — SIGNIFICANT CHANGE UP (ref 7–23)
BURR CELLS BLD QL SMEAR: PRESENT — SIGNIFICANT CHANGE UP
CALCIUM SERPL-MCNC: 9.4 MG/DL — SIGNIFICANT CHANGE UP (ref 8.4–10.5)
CHLORIDE SERPL-SCNC: 106 MMOL/L — SIGNIFICANT CHANGE UP (ref 98–107)
CO2 SERPL-SCNC: 23 MMOL/L — SIGNIFICANT CHANGE UP (ref 22–31)
CREAT SERPL-MCNC: 0.93 MG/DL — SIGNIFICANT CHANGE UP (ref 0.5–1.3)
EOSINOPHIL # BLD AUTO: 0.2 K/UL — SIGNIFICANT CHANGE UP (ref 0–0.5)
EOSINOPHIL NFR BLD AUTO: 2 % — SIGNIFICANT CHANGE UP (ref 0–6)
GLUCOSE SERPL-MCNC: 150 MG/DL — HIGH (ref 70–99)
HCT VFR BLD CALC: 29.3 % — LOW (ref 34.5–45)
HGB BLD-MCNC: 9.6 G/DL — LOW (ref 11.5–15.5)
HYPOCHROMIA BLD QL: SLIGHT — SIGNIFICANT CHANGE UP
IMM GRANULOCYTES NFR BLD AUTO: 0.6 % — SIGNIFICANT CHANGE UP (ref 0–1.5)
LYMPHOCYTES # BLD AUTO: 6.42 K/UL — HIGH (ref 1–3.3)
LYMPHOCYTES # BLD AUTO: 64.3 % — HIGH (ref 13–44)
LYMPHOCYTES NFR SPEC AUTO: 67 % — HIGH (ref 13–44)
MACROCYTES BLD QL: SLIGHT — SIGNIFICANT CHANGE UP
MAGNESIUM SERPL-MCNC: 1.8 MG/DL — SIGNIFICANT CHANGE UP (ref 1.6–2.6)
MCHC RBC-ENTMCNC: 32 PG — SIGNIFICANT CHANGE UP (ref 27–34)
MCHC RBC-ENTMCNC: 32.8 % — SIGNIFICANT CHANGE UP (ref 32–36)
MCV RBC AUTO: 97.7 FL — SIGNIFICANT CHANGE UP (ref 80–100)
MONOCYTES # BLD AUTO: 0.53 K/UL — SIGNIFICANT CHANGE UP (ref 0–0.9)
MONOCYTES NFR BLD AUTO: 5.3 % — SIGNIFICANT CHANGE UP (ref 2–14)
MONOCYTES NFR BLD: 2 % — SIGNIFICANT CHANGE UP (ref 2–9)
NEUTROPHIL AB SER-ACNC: 16 % — LOW (ref 43–77)
NEUTROPHILS # BLD AUTO: 2.74 K/UL — SIGNIFICANT CHANGE UP (ref 1.8–7.4)
NEUTROPHILS NFR BLD AUTO: 27.4 % — LOW (ref 43–77)
OVALOCYTES BLD QL SMEAR: SLIGHT — SIGNIFICANT CHANGE UP
PHOSPHATE SERPL-MCNC: 2.6 MG/DL — SIGNIFICANT CHANGE UP (ref 2.5–4.5)
PLATELET # BLD AUTO: 261 K/UL — SIGNIFICANT CHANGE UP (ref 150–400)
PLATELET COUNT - ESTIMATE: NORMAL — SIGNIFICANT CHANGE UP
PMV BLD: 10.5 FL — SIGNIFICANT CHANGE UP (ref 7–13)
POLYCHROMASIA BLD QL SMEAR: SLIGHT — SIGNIFICANT CHANGE UP
POTASSIUM SERPL-MCNC: 3.4 MMOL/L — LOW (ref 3.5–5.3)
POTASSIUM SERPL-SCNC: 3.4 MMOL/L — LOW (ref 3.5–5.3)
RBC # BLD: 3 M/UL — LOW (ref 3.8–5.2)
RBC # FLD: 12.9 % — SIGNIFICANT CHANGE UP (ref 10.3–14.5)
SODIUM SERPL-SCNC: 143 MMOL/L — SIGNIFICANT CHANGE UP (ref 135–145)
VARIANT LYMPHS # FLD: 13 % — SIGNIFICANT CHANGE UP
WBC # BLD: 9.99 K/UL — SIGNIFICANT CHANGE UP (ref 3.8–10.5)
WBC # FLD AUTO: 9.99 K/UL — SIGNIFICANT CHANGE UP (ref 3.8–10.5)

## 2017-01-20 PROCEDURE — 99239 HOSP IP/OBS DSCHRG MGMT >30: CPT

## 2017-01-20 RX ORDER — POTASSIUM CHLORIDE 20 MEQ
40 PACKET (EA) ORAL ONCE
Qty: 0 | Refills: 0 | Status: COMPLETED | OUTPATIENT
Start: 2017-01-20 | End: 2017-01-20

## 2017-01-20 RX ORDER — IPRATROPIUM/ALBUTEROL SULFATE 18-103MCG
3 AEROSOL WITH ADAPTER (GRAM) INHALATION
Qty: 0 | Refills: 0 | COMMUNITY
Start: 2017-01-20

## 2017-01-20 RX ORDER — SENNA PLUS 8.6 MG/1
2 TABLET ORAL
Qty: 0 | Refills: 0 | COMMUNITY
Start: 2017-01-20

## 2017-01-20 RX ORDER — DOCUSATE SODIUM 100 MG
1 CAPSULE ORAL
Qty: 0 | Refills: 0 | COMMUNITY
Start: 2017-01-20

## 2017-01-20 RX ADMIN — Medication 3 MILLILITER(S): at 05:00

## 2017-01-20 RX ADMIN — HEPARIN SODIUM 5000 UNIT(S): 5000 INJECTION INTRAVENOUS; SUBCUTANEOUS at 15:12

## 2017-01-20 RX ADMIN — LOSARTAN POTASSIUM 50 MILLIGRAM(S): 100 TABLET, FILM COATED ORAL at 06:08

## 2017-01-20 RX ADMIN — Medication 100 MILLIGRAM(S): at 15:12

## 2017-01-20 RX ADMIN — POLYETHYLENE GLYCOL 3350 17 GRAM(S): 17 POWDER, FOR SOLUTION ORAL at 12:50

## 2017-01-20 RX ADMIN — Medication 3 MILLILITER(S): at 16:28

## 2017-01-20 RX ADMIN — Medication 650 MILLIGRAM(S): at 17:24

## 2017-01-20 RX ADMIN — Medication 650 MILLIGRAM(S): at 16:53

## 2017-01-20 RX ADMIN — Medication 100 MILLIGRAM(S): at 06:08

## 2017-01-20 RX ADMIN — Medication 40 MILLIEQUIVALENT(S): at 10:35

## 2017-01-20 RX ADMIN — HEPARIN SODIUM 5000 UNIT(S): 5000 INJECTION INTRAVENOUS; SUBCUTANEOUS at 06:08

## 2017-01-20 RX ADMIN — AMLODIPINE BESYLATE 5 MILLIGRAM(S): 2.5 TABLET ORAL at 06:08

## 2017-01-20 RX ADMIN — Medication 3 MILLILITER(S): at 10:50

## 2017-01-20 RX ADMIN — Medication 2: at 09:55

## 2017-01-20 RX ADMIN — Medication 40 MILLIEQUIVALENT(S): at 17:24

## 2017-01-20 NOTE — DISCHARGE NOTE ADULT - CARE PLAN
Principal Discharge DX:	Bronchitis  Goal:	Resolution of symptoms  Instructions for follow-up, activity and diet:	You were admitted to the hospital for management of your shortness of breath and cough. You were found to have a viral infection with RSV, a common viral illness which causes bronchitis and/or symptoms of the common cold. You were treated with nebulizers and cough medicine to help alleviate your symptoms. If you develop any recurrence of cough, fever, chills or wheezing, please call your primary care doctor or return to the ED.  Secondary Diagnosis:	Fall, initial encounter  Goal:	Prevention of falls  Instructions for follow-up, activity and diet:	You had a mechanical fall which occurred as a result of tripping out of bed. We are not concerned that you have any other underlying medical issues causing your falls. You are being discharged to rehab to help increase your physical strength and endurance.  Secondary Diagnosis:	Type 2 diabetes mellitus without complication, unspecified long term insulin use status  Goal:	Control of blood sugars  Instructions for follow-up, activity and diet:	Continue with your home diabetes management regimen and follow up with your primary care doctor after discharge.

## 2017-01-20 NOTE — DISCHARGE NOTE ADULT - MEDICATION SUMMARY - MEDICATIONS TO TAKE
I will START or STAY ON the medications listed below when I get home from the hospital:    Lantus 100 units/mL subcutaneous solution  -- 30 unit(s) subcutaneous once a day (at bedtime)  -- Indication: For Diabetes    metformin 500 mg oral tablet  -- 1 tab(s) by mouth 2 times a day  -- Indication: For Diabetes    Brian 5 mg-20 mg oral tablet  -- 1 tab(s) by mouth once a day  -- Indication: For hypertension    albuterol-ipratropium 2.5 mg-0.5 mg/3 mL inhalation solution  -- 3 milliliter(s) inhaled every 6 hours, As Needed for shortness of breath  -- Indication: For Shortness of breath    docusate sodium 100 mg oral capsule  -- 1 cap(s) by mouth 3 times a day, As Needed constipation  -- Indication: For Constipation    senna oral tablet  -- 2 tab(s) by mouth once a day (at bedtime), As Needed constipation  -- Indication: For Constipation

## 2017-01-20 NOTE — DISCHARGE NOTE ADULT - PATIENT PORTAL LINK FT
“You can access the [facility] Patient Portal, offered by [facility], by registering with the following website: [email or web address]”

## 2017-01-20 NOTE — DISCHARGE NOTE ADULT - PLAN OF CARE
Resolution of symptoms You were admitted to the hospital for management of your shortness of breath and cough. You were found to have a viral infection with RSV, a common viral illness which causes bronchitis and/or symptoms of the common cold. You were treated with nebulizers and cough medicine to help alleviate your symptoms. If you develop any recurrence of cough, fever, chills or wheezing, please call your primary care doctor or return to the ED. Prevention of falls You had a mechanical fall which occurred as a result of tripping out of bed. We are not concerned that you have any other underlying medical issues causing your falls. You are being discharged to rehab to help increase your physical strength and endurance. Control of blood sugars Continue with your home diabetes management regimen and follow up with your primary care doctor after discharge.

## 2017-01-20 NOTE — DISCHARGE NOTE ADULT - HOSPITAL COURSE
89F hx T2DM, HTN presents to the ED after triage note indicates witnessed mechanical fall at home. On arrival patient noted to have cough w/ nasal discharge and chest congestion along with (+) RVP consistent with viral bronchitis with clear lungs on CXR, subsequently admitted to medicine for management of RVP+ for RSV. Patient was treated with symptomatic support with duonebs and cough suppressants. Patient was monitored off antibiotics in the setting of negative blood and urine cultures, and continued to improve. She was evaluated by PT, and was recommended to go rehab for further strength and endurance improvement. She was discharged to rehab in stable condition.

## 2017-01-20 NOTE — DISCHARGE NOTE ADULT - MEDICATION SUMMARY - MEDICATIONS TO STOP TAKING
I will STOP taking the medications listed below when I get home from the hospital:    Keflex 500 mg oral capsule  -- 1 cap(s) by mouth 2 times a day  -- Finish all this medication unless otherwise directed by prescriber.

## 2017-01-20 NOTE — DISCHARGE NOTE ADULT - SECONDARY DIAGNOSIS.
Fall, initial encounter Type 2 diabetes mellitus without complication, unspecified long term insulin use status

## 2017-11-20 NOTE — ED ADULT TRIAGE NOTE - BP NONINVASIVE SYSTOLIC (MM HG)
449 89F with dense L hemiparesis consistent with CVA. Code stroke called. No bleed on CT. Plan: labs, CT, neuro c/s, tPA, admit MICU.

## 2018-10-28 ENCOUNTER — INPATIENT (INPATIENT)
Facility: HOSPITAL | Age: 83
LOS: 2 days | Discharge: HOME HEALTH | End: 2018-10-31
Attending: HOSPITALIST | Admitting: HOSPITALIST
Payer: MEDICARE

## 2018-10-28 VITALS
DIASTOLIC BLOOD PRESSURE: 87 MMHG | OXYGEN SATURATION: 98 % | TEMPERATURE: 99 F | HEART RATE: 101 BPM | RESPIRATION RATE: 18 BRPM | SYSTOLIC BLOOD PRESSURE: 178 MMHG

## 2018-10-28 DIAGNOSIS — D64.9 ANEMIA, UNSPECIFIED: ICD-10-CM

## 2018-10-28 DIAGNOSIS — Z90.710 ACQUIRED ABSENCE OF BOTH CERVIX AND UTERUS: Chronic | ICD-10-CM
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
## 2018-10-28 DIAGNOSIS — E11.9 TYPE 2 DIABETES MELLITUS WITHOUT COMPLICATIONS: ICD-10-CM

## 2018-10-28 DIAGNOSIS — I10 ESSENTIAL (PRIMARY) HYPERTENSION: ICD-10-CM

## 2018-10-28 DIAGNOSIS — D25.9 LEIOMYOMA OF UTERUS, UNSPECIFIED: Chronic | ICD-10-CM

## 2018-10-28 DIAGNOSIS — Z29.9 ENCOUNTER FOR PROPHYLACTIC MEASURES, UNSPECIFIED: ICD-10-CM

## 2018-10-28 DIAGNOSIS — M19.90 UNSPECIFIED OSTEOARTHRITIS, UNSPECIFIED SITE: ICD-10-CM

## 2018-10-28 DIAGNOSIS — F03.90 UNSPECIFIED DEMENTIA WITHOUT BEHAVIORAL DISTURBANCE: ICD-10-CM

## 2018-10-28 LAB
ALBUMIN SERPL ELPH-MCNC: 4.3 G/DL — SIGNIFICANT CHANGE UP (ref 3.3–5)
ALP SERPL-CCNC: 75 U/L — SIGNIFICANT CHANGE UP (ref 40–120)
ALT FLD-CCNC: 10 U/L — SIGNIFICANT CHANGE UP (ref 4–33)
APPEARANCE UR: CLEAR — SIGNIFICANT CHANGE UP
AST SERPL-CCNC: 26 U/L — SIGNIFICANT CHANGE UP (ref 4–32)
BASOPHILS # BLD AUTO: 0.06 K/UL — SIGNIFICANT CHANGE UP (ref 0–0.2)
BASOPHILS NFR BLD AUTO: 0.5 % — SIGNIFICANT CHANGE UP (ref 0–2)
BILIRUB SERPL-MCNC: 0.4 MG/DL — SIGNIFICANT CHANGE UP (ref 0.2–1.2)
BILIRUB UR-MCNC: NEGATIVE — SIGNIFICANT CHANGE UP
BLOOD UR QL VISUAL: NEGATIVE — SIGNIFICANT CHANGE UP
BUN SERPL-MCNC: 14 MG/DL — SIGNIFICANT CHANGE UP (ref 7–23)
CALCIUM SERPL-MCNC: 10 MG/DL — SIGNIFICANT CHANGE UP (ref 8.4–10.5)
CHLORIDE SERPL-SCNC: 102 MMOL/L — SIGNIFICANT CHANGE UP (ref 98–107)
CO2 SERPL-SCNC: 23 MMOL/L — SIGNIFICANT CHANGE UP (ref 22–31)
COLOR SPEC: COLORLESS — SIGNIFICANT CHANGE UP
CREAT SERPL-MCNC: 1.14 MG/DL — SIGNIFICANT CHANGE UP (ref 0.5–1.3)
EOSINOPHIL # BLD AUTO: 0.01 K/UL — SIGNIFICANT CHANGE UP (ref 0–0.5)
EOSINOPHIL NFR BLD AUTO: 0.1 % — SIGNIFICANT CHANGE UP (ref 0–6)
GLUCOSE SERPL-MCNC: 172 MG/DL — HIGH (ref 70–99)
GLUCOSE UR-MCNC: NEGATIVE — SIGNIFICANT CHANGE UP
HCT VFR BLD CALC: 30.4 % — LOW (ref 34.5–45)
HGB BLD-MCNC: 9.9 G/DL — LOW (ref 11.5–15.5)
IMM GRANULOCYTES # BLD AUTO: 0.09 # — SIGNIFICANT CHANGE UP
IMM GRANULOCYTES NFR BLD AUTO: 0.8 % — SIGNIFICANT CHANGE UP (ref 0–1.5)
KETONES UR-MCNC: NEGATIVE — SIGNIFICANT CHANGE UP
LEUKOCYTE ESTERASE UR-ACNC: NEGATIVE — SIGNIFICANT CHANGE UP
LYMPHOCYTES # BLD AUTO: 2.36 K/UL — SIGNIFICANT CHANGE UP (ref 1–3.3)
LYMPHOCYTES # BLD AUTO: 20.9 % — SIGNIFICANT CHANGE UP (ref 13–44)
MCHC RBC-ENTMCNC: 32.6 % — SIGNIFICANT CHANGE UP (ref 32–36)
MCHC RBC-ENTMCNC: 32.8 PG — SIGNIFICANT CHANGE UP (ref 27–34)
MCV RBC AUTO: 100.7 FL — HIGH (ref 80–100)
MONOCYTES # BLD AUTO: 0.78 K/UL — SIGNIFICANT CHANGE UP (ref 0–0.9)
MONOCYTES NFR BLD AUTO: 6.9 % — SIGNIFICANT CHANGE UP (ref 2–14)
NEUTROPHILS # BLD AUTO: 8.01 K/UL — HIGH (ref 1.8–7.4)
NEUTROPHILS NFR BLD AUTO: 70.8 % — SIGNIFICANT CHANGE UP (ref 43–77)
NITRITE UR-MCNC: NEGATIVE — SIGNIFICANT CHANGE UP
NRBC # FLD: 0 — SIGNIFICANT CHANGE UP
PH UR: 6.5 — SIGNIFICANT CHANGE UP (ref 5–8)
PLATELET # BLD AUTO: 248 K/UL — SIGNIFICANT CHANGE UP (ref 150–400)
PMV BLD: 9.7 FL — SIGNIFICANT CHANGE UP (ref 7–13)
POTASSIUM SERPL-MCNC: 4.2 MMOL/L — SIGNIFICANT CHANGE UP (ref 3.5–5.3)
POTASSIUM SERPL-SCNC: 4.2 MMOL/L — SIGNIFICANT CHANGE UP (ref 3.5–5.3)
PROT SERPL-MCNC: 7.9 G/DL — SIGNIFICANT CHANGE UP (ref 6–8.3)
PROT UR-MCNC: NEGATIVE — SIGNIFICANT CHANGE UP
RBC # BLD: 3.02 M/UL — LOW (ref 3.8–5.2)
RBC # FLD: 12.5 % — SIGNIFICANT CHANGE UP (ref 10.3–14.5)
SODIUM SERPL-SCNC: 140 MMOL/L — SIGNIFICANT CHANGE UP (ref 135–145)
SP GR SPEC: 1.01 — SIGNIFICANT CHANGE UP (ref 1–1.04)
TROPONIN T, HIGH SENSITIVITY: 26 NG/L — SIGNIFICANT CHANGE UP (ref ?–14)
TROPONIN T, HIGH SENSITIVITY: 34 NG/L — SIGNIFICANT CHANGE UP (ref ?–14)
UROBILINOGEN FLD QL: NORMAL — SIGNIFICANT CHANGE UP
WBC # BLD: 11.31 K/UL — HIGH (ref 3.8–10.5)
WBC # FLD AUTO: 11.31 K/UL — HIGH (ref 3.8–10.5)

## 2018-10-28 PROCEDURE — 99223 1ST HOSP IP/OBS HIGH 75: CPT

## 2018-10-28 PROCEDURE — 74177 CT ABD & PELVIS W/CONTRAST: CPT | Mod: 26

## 2018-10-28 PROCEDURE — 70450 CT HEAD/BRAIN W/O DYE: CPT | Mod: 26

## 2018-10-28 PROCEDURE — 72170 X-RAY EXAM OF PELVIS: CPT | Mod: 26

## 2018-10-28 PROCEDURE — 71045 X-RAY EXAM CHEST 1 VIEW: CPT | Mod: 26

## 2018-10-28 RX ORDER — SODIUM CHLORIDE 9 MG/ML
1000 INJECTION, SOLUTION INTRAVENOUS
Qty: 0 | Refills: 0 | Status: DISCONTINUED | OUTPATIENT
Start: 2018-10-28 | End: 2018-10-31

## 2018-10-28 RX ORDER — LOSARTAN POTASSIUM 100 MG/1
50 TABLET, FILM COATED ORAL DAILY
Qty: 0 | Refills: 0 | Status: DISCONTINUED | OUTPATIENT
Start: 2018-10-29 | End: 2018-10-30

## 2018-10-28 RX ORDER — HEPARIN SODIUM 5000 [USP'U]/ML
5000 INJECTION INTRAVENOUS; SUBCUTANEOUS EVERY 8 HOURS
Qty: 0 | Refills: 0 | Status: DISCONTINUED | OUTPATIENT
Start: 2018-10-28 | End: 2018-10-31

## 2018-10-28 RX ORDER — ACETAMINOPHEN 500 MG
650 TABLET ORAL ONCE
Qty: 0 | Refills: 0 | Status: COMPLETED | OUTPATIENT
Start: 2018-10-28 | End: 2018-10-28

## 2018-10-28 RX ORDER — DEXTROSE 50 % IN WATER 50 %
25 SYRINGE (ML) INTRAVENOUS ONCE
Qty: 0 | Refills: 0 | Status: DISCONTINUED | OUTPATIENT
Start: 2018-10-28 | End: 2018-10-31

## 2018-10-28 RX ORDER — LOSARTAN POTASSIUM 100 MG/1
50 TABLET, FILM COATED ORAL ONCE
Qty: 0 | Refills: 0 | Status: COMPLETED | OUTPATIENT
Start: 2018-10-28 | End: 2018-10-28

## 2018-10-28 RX ORDER — DEXTROSE 50 % IN WATER 50 %
15 SYRINGE (ML) INTRAVENOUS ONCE
Qty: 0 | Refills: 0 | Status: DISCONTINUED | OUTPATIENT
Start: 2018-10-28 | End: 2018-10-31

## 2018-10-28 RX ORDER — DEXTROSE 50 % IN WATER 50 %
12.5 SYRINGE (ML) INTRAVENOUS ONCE
Qty: 0 | Refills: 0 | Status: DISCONTINUED | OUTPATIENT
Start: 2018-10-28 | End: 2018-10-31

## 2018-10-28 RX ORDER — INSULIN LISPRO 100/ML
VIAL (ML) SUBCUTANEOUS
Qty: 0 | Refills: 0 | Status: DISCONTINUED | OUTPATIENT
Start: 2018-10-28 | End: 2018-10-31

## 2018-10-28 RX ORDER — INSULIN LISPRO 100/ML
VIAL (ML) SUBCUTANEOUS AT BEDTIME
Qty: 0 | Refills: 0 | Status: DISCONTINUED | OUTPATIENT
Start: 2018-10-28 | End: 2018-10-31

## 2018-10-28 RX ORDER — GLUCAGON INJECTION, SOLUTION 0.5 MG/.1ML
1 INJECTION, SOLUTION SUBCUTANEOUS ONCE
Qty: 0 | Refills: 0 | Status: DISCONTINUED | OUTPATIENT
Start: 2018-10-28 | End: 2018-10-31

## 2018-10-28 RX ORDER — ASPIRIN/CALCIUM CARB/MAGNESIUM 324 MG
81 TABLET ORAL DAILY
Qty: 0 | Refills: 0 | Status: DISCONTINUED | OUTPATIENT
Start: 2018-10-28 | End: 2018-10-31

## 2018-10-28 RX ORDER — ACETAMINOPHEN 500 MG
650 TABLET ORAL EVERY 8 HOURS
Qty: 0 | Refills: 0 | Status: DISCONTINUED | OUTPATIENT
Start: 2018-10-28 | End: 2018-10-31

## 2018-10-28 RX ADMIN — HEPARIN SODIUM 5000 UNIT(S): 5000 INJECTION INTRAVENOUS; SUBCUTANEOUS at 21:59

## 2018-10-28 RX ADMIN — Medication 650 MILLIGRAM(S): at 22:00

## 2018-10-28 RX ADMIN — LOSARTAN POTASSIUM 50 MILLIGRAM(S): 100 TABLET, FILM COATED ORAL at 21:59

## 2018-10-28 RX ADMIN — Medication 650 MILLIGRAM(S): at 23:00

## 2018-10-28 NOTE — H&P ADULT - PROBLEM SELECTOR PLAN 1
Unwitnessed fall, TELE monitor, ECHO, Fall/aspiration precaution,   PT consult, CPK,   F/U CBC, CMP,  on ASA,

## 2018-10-28 NOTE — ED ADULT NURSE NOTE - NSIMPLEMENTINTERV_GEN_ALL_ED
Implemented All Fall with Harm Risk Interventions:  [place] to call system. Call bell, personal items and telephone within reach. Instruct patient to call for assistance. Room bathroom lighting operational. Non-slip footwear when patient is off stretcher. Physically safe environment: no spills, clutter or unnecessary equipment. Stretcher in lowest position, wheels locked, appropriate side rails in place. Provide visual cue, wrist band, yellow gown, etc. Monitor gait and stability. Monitor for mental status changes and reorient to person, place, and time. Review medications for side effects contributing to fall risk. Reinforce activity limits and safety measures with patient and family. Provide visual clues: red socks.

## 2018-10-28 NOTE — H&P ADULT - PROBLEM SELECTOR PLAN 2
Need to Confirm the Home Meds,  [facility],  Echo, F/U BMP , Fasting lipid,
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
## 2018-10-28 NOTE — ED PROVIDER NOTE - ATTENDING CONTRIBUTION TO CARE
Lockonrado Locurto  pt with hypertension  diabetes  reported fall at home with LBP  Pt unable to provide  more of a story with Creole     Pt oriented to self, intermittently to place (also noted on last hospitalization note as was the inability to provide history)  Family contacted but provided no information)      exam  pt awake  alert  clear lungs  card RRR S1S2  soft systolic murmur   moves all ext  appears to have pain when lifting Lt leg  no visible deformity  variable tenderness rt abd no guarding or rebound  EKG  no acute changes    Plan  labs  palin films pelvis/chest  with tenderness will CT abd

## 2018-10-28 NOTE — ED ADULT NURSE REASSESSMENT NOTE - NS ED NURSE REASSESS COMMENT FT1
pt A&Ox1, at mental baseline, creole speaking. no distress noted. c/o pain to ribs. medicated per MD order. will continue to monitor.
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
## 2018-10-28 NOTE — ED PROVIDER NOTE - MEDICAL DECISION MAKING DETAILS
89F dementia p/w possible mechanical fall. Unable to obatin further history from patient. Non-toxic appearing. On PE, paraspinal lumbar tenderness b/l otherwise wnl. No suprapubic tenderness on exam.   Will obtain basic set of labs. CBC, CMP,  HST, TSH, UA. CXR. Tylenol 650mg PO for pain.

## 2018-10-28 NOTE — H&P ADULT - PSH
No significant past surgical history Uterine fibroid S/P hysterectomy with oophorectomy    Uterine fibroid

## 2018-10-28 NOTE — ED ADULT NURSE REASSESSMENT NOTE - NS ED NURSE REASSESS COMMENT FT1
pt A&Ox1, calm and cooperative, respirations equal and non labored. on tele monitor. vitals as noted, will continue to monitor.
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
## 2018-10-28 NOTE — ED ADULT NURSE REASSESSMENT NOTE - NS ED NURSE REASSESS COMMENT FT1
pt A&Ox1, appears comfortable, respirations equal and non labored. pt calm and cooperative. will continue to monitor.

## 2018-10-28 NOTE — ED ADULT NURSE REASSESSMENT NOTE - NS ED NURSE REASSESS COMMENT FT1
break Coverage - Pt a&ox1 bought in for eval of mechanical fall pt in nad safety & comfort measures maintained eval on going. Break Coverage - Pt a&ox1 bought in for eval of mechanical fall pt in nad safety & comfort measures maintained eval on going.

## 2018-10-28 NOTE — H&P ADULT - PMH
Chronic low back pain    DM (diabetes mellitus)    HTN (hypertension)    Shingles Anemia    Chronic low back pain    Dementia    DM (diabetes mellitus)    HTN (hypertension)    OA (osteoarthritis)    Obesity    Shingles

## 2018-10-28 NOTE — ED PROVIDER NOTE - PROGRESS NOTE DETAILS
Called son for collateral information. Reports that he is at work and will be in later to talk. When asked why his mother was in the emergency room he stated 'she fell and you figure it out.'
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
## 2018-10-29 LAB
ALBUMIN SERPL ELPH-MCNC: 4.1 G/DL — SIGNIFICANT CHANGE UP (ref 3.3–5)
ALP SERPL-CCNC: 73 U/L — SIGNIFICANT CHANGE UP (ref 40–120)
ALT FLD-CCNC: 14 U/L — SIGNIFICANT CHANGE UP (ref 4–33)
APTT BLD: 30.4 SEC — SIGNIFICANT CHANGE UP (ref 27.5–37.4)
AST SERPL-CCNC: 29 U/L — SIGNIFICANT CHANGE UP (ref 4–32)
BACTERIA UR CULT: SIGNIFICANT CHANGE UP
BASOPHILS # BLD AUTO: 0.07 K/UL — SIGNIFICANT CHANGE UP (ref 0–0.2)
BASOPHILS NFR BLD AUTO: 0.8 % — SIGNIFICANT CHANGE UP (ref 0–2)
BILIRUB SERPL-MCNC: 0.5 MG/DL — SIGNIFICANT CHANGE UP (ref 0.2–1.2)
BUN SERPL-MCNC: 18 MG/DL — SIGNIFICANT CHANGE UP (ref 7–23)
CALCIUM SERPL-MCNC: 10.2 MG/DL — SIGNIFICANT CHANGE UP (ref 8.4–10.5)
CHLORIDE SERPL-SCNC: 104 MMOL/L — SIGNIFICANT CHANGE UP (ref 98–107)
CHOLEST SERPL-MCNC: 169 MG/DL — SIGNIFICANT CHANGE UP (ref 120–199)
CK SERPL-CCNC: 917 U/L — HIGH (ref 25–170)
CO2 SERPL-SCNC: 23 MMOL/L — SIGNIFICANT CHANGE UP (ref 22–31)
CREAT SERPL-MCNC: 1.26 MG/DL — SIGNIFICANT CHANGE UP (ref 0.5–1.3)
EOSINOPHIL # BLD AUTO: 0.23 K/UL — SIGNIFICANT CHANGE UP (ref 0–0.5)
EOSINOPHIL NFR BLD AUTO: 2.5 % — SIGNIFICANT CHANGE UP (ref 0–6)
FERRITIN SERPL-MCNC: 91.15 NG/ML — SIGNIFICANT CHANGE UP (ref 15–150)
FOLATE SERPL-MCNC: > 20 NG/ML — HIGH (ref 4.7–20)
GLUCOSE SERPL-MCNC: 169 MG/DL — HIGH (ref 70–99)
HAV IGM SER-ACNC: NONREACTIVE — SIGNIFICANT CHANGE UP
HBA1C BLD-MCNC: 6.2 % — HIGH (ref 4–5.6)
HBV CORE IGM SER-ACNC: NONREACTIVE — SIGNIFICANT CHANGE UP
HBV SURFACE AG SER-ACNC: NONREACTIVE — SIGNIFICANT CHANGE UP
HCT VFR BLD CALC: 32 % — LOW (ref 34.5–45)
HCV AB S/CO SERPL IA: 0.11 S/CO — SIGNIFICANT CHANGE UP
HCV AB SERPL-IMP: SIGNIFICANT CHANGE UP
HDLC SERPL-MCNC: 61 MG/DL — SIGNIFICANT CHANGE UP (ref 45–65)
HGB BLD-MCNC: 10.5 G/DL — LOW (ref 11.5–15.5)
IMM GRANULOCYTES # BLD AUTO: 0.03 # — SIGNIFICANT CHANGE UP
IMM GRANULOCYTES NFR BLD AUTO: 0.3 % — SIGNIFICANT CHANGE UP (ref 0–1.5)
INR BLD: 1.07 — SIGNIFICANT CHANGE UP (ref 0.88–1.17)
IRON SATN MFR SERPL: 290 UG/DL — SIGNIFICANT CHANGE UP (ref 140–530)
IRON SATN MFR SERPL: 58 UG/DL — SIGNIFICANT CHANGE UP (ref 30–160)
LIPID PNL WITH DIRECT LDL SERPL: 98 MG/DL — SIGNIFICANT CHANGE UP
LYMPHOCYTES # BLD AUTO: 3.8 K/UL — HIGH (ref 1–3.3)
LYMPHOCYTES # BLD AUTO: 41.1 % — SIGNIFICANT CHANGE UP (ref 13–44)
MAGNESIUM SERPL-MCNC: 1.9 MG/DL — SIGNIFICANT CHANGE UP (ref 1.6–2.6)
MCHC RBC-ENTMCNC: 32.6 PG — SIGNIFICANT CHANGE UP (ref 27–34)
MCHC RBC-ENTMCNC: 32.8 % — SIGNIFICANT CHANGE UP (ref 32–36)
MCV RBC AUTO: 99.4 FL — SIGNIFICANT CHANGE UP (ref 80–100)
MONOCYTES # BLD AUTO: 0.74 K/UL — SIGNIFICANT CHANGE UP (ref 0–0.9)
MONOCYTES NFR BLD AUTO: 8 % — SIGNIFICANT CHANGE UP (ref 2–14)
NEUTROPHILS # BLD AUTO: 4.38 K/UL — SIGNIFICANT CHANGE UP (ref 1.8–7.4)
NEUTROPHILS NFR BLD AUTO: 47.3 % — SIGNIFICANT CHANGE UP (ref 43–77)
NRBC # FLD: 0 — SIGNIFICANT CHANGE UP
PHOSPHATE SERPL-MCNC: 4 MG/DL — SIGNIFICANT CHANGE UP (ref 2.5–4.5)
PLATELET # BLD AUTO: 289 K/UL — SIGNIFICANT CHANGE UP (ref 150–400)
PMV BLD: 9.7 FL — SIGNIFICANT CHANGE UP (ref 7–13)
POTASSIUM SERPL-MCNC: 4 MMOL/L — SIGNIFICANT CHANGE UP (ref 3.5–5.3)
POTASSIUM SERPL-SCNC: 4 MMOL/L — SIGNIFICANT CHANGE UP (ref 3.5–5.3)
PROT SERPL-MCNC: 8.1 G/DL — SIGNIFICANT CHANGE UP (ref 6–8.3)
PROTHROM AB SERPL-ACNC: 11.9 SEC — SIGNIFICANT CHANGE UP (ref 9.8–13.1)
RBC # BLD: 3.22 M/UL — LOW (ref 3.8–5.2)
RBC # FLD: 12.4 % — SIGNIFICANT CHANGE UP (ref 10.3–14.5)
SODIUM SERPL-SCNC: 141 MMOL/L — SIGNIFICANT CHANGE UP (ref 135–145)
SPECIMEN SOURCE: SIGNIFICANT CHANGE UP
T4 FREE SERPL-MCNC: 0.63 NG/DL — LOW (ref 0.9–1.8)
TRIGL SERPL-MCNC: 78 MG/DL — SIGNIFICANT CHANGE UP (ref 10–149)
TSH SERPL-MCNC: 2.27 UIU/ML — SIGNIFICANT CHANGE UP (ref 0.27–4.2)
UIBC SERPL-MCNC: 232.1 UG/DL — SIGNIFICANT CHANGE UP (ref 110–370)
VIT B12 SERPL-MCNC: 2000 PG/ML — HIGH (ref 200–900)
WBC # BLD: 9.25 K/UL — SIGNIFICANT CHANGE UP (ref 3.8–10.5)
WBC # FLD AUTO: 9.25 K/UL — SIGNIFICANT CHANGE UP (ref 3.8–10.5)

## 2018-10-29 PROCEDURE — 99233 SBSQ HOSP IP/OBS HIGH 50: CPT

## 2018-10-29 RX ORDER — AMLODIPINE BESYLATE 2.5 MG/1
5 TABLET ORAL DAILY
Qty: 0 | Refills: 0 | Status: DISCONTINUED | OUTPATIENT
Start: 2018-10-29 | End: 2018-10-31

## 2018-10-29 RX ADMIN — Medication 1: at 14:35

## 2018-10-29 RX ADMIN — HEPARIN SODIUM 5000 UNIT(S): 5000 INJECTION INTRAVENOUS; SUBCUTANEOUS at 05:57

## 2018-10-29 RX ADMIN — LOSARTAN POTASSIUM 50 MILLIGRAM(S): 100 TABLET, FILM COATED ORAL at 05:57

## 2018-10-29 RX ADMIN — HEPARIN SODIUM 5000 UNIT(S): 5000 INJECTION INTRAVENOUS; SUBCUTANEOUS at 22:20

## 2018-10-29 RX ADMIN — Medication 650 MILLIGRAM(S): at 07:50

## 2018-10-29 RX ADMIN — HEPARIN SODIUM 5000 UNIT(S): 5000 INJECTION INTRAVENOUS; SUBCUTANEOUS at 15:13

## 2018-10-29 RX ADMIN — Medication 2: at 18:44

## 2018-10-29 RX ADMIN — AMLODIPINE BESYLATE 5 MILLIGRAM(S): 2.5 TABLET ORAL at 12:13

## 2018-10-29 RX ADMIN — Medication 1: at 09:20

## 2018-10-29 RX ADMIN — Medication 81 MILLIGRAM(S): at 12:13

## 2018-10-29 RX ADMIN — Medication 650 MILLIGRAM(S): at 09:18

## 2018-10-29 NOTE — PROGRESS NOTE ADULT - PROBLEM SELECTOR PLAN 1
- Likely mechanical fall from debility/chronic osteoarthralgia in R knee  - Pt does not need TTE  - CT head negative for structural abnormalities to explain falls, no hemorrhage   - Check repeat CPK in AM

## 2018-10-29 NOTE — SWALLOW BEDSIDE ASSESSMENT ADULT - SWALLOW EVAL: DIAGNOSIS
1.) Patient presents with adequate oral stage skills for puree, solids, nectar thickened liquids, and thin liquids marked by adequate bolus acceptance, formation, transfer and clearance.  2.) Pharyngeal stage of swallow for puree, solids, and nectar thickened liquids marked by timely swallow trigger, adequate hyolaryngeal elevation and no overt s/s of penetration/aspiration. 3.) Pharyngeal stage of swallow for thin liquids marked by prompt swallow trigger, adequate hyolaryngeal elevation and overt s/s of penetration/aspiration in for the form of changes in her respiratory pattern, which can be indicative of possible aspiration.

## 2018-10-29 NOTE — ED ADULT NURSE REASSESSMENT NOTE - NS ED NURSE REASSESS COMMENT FT1
pt A&O to mental baseline, creole speaking, appears comfortable. respirations equal and non labored. AM meds given. pt c/o pain to back from earlier fall. pt repositioned, comfort measures provided. will monitor.

## 2018-10-29 NOTE — SWALLOW BEDSIDE ASSESSMENT ADULT - ASR SWALLOW RECOMMEND DIAG
VFSS/MBS/Recommend objective swallow study to rule out silent penetration/aspiration secondary to change in respiratory status post swallows of thin liquids

## 2018-10-29 NOTE — PATIENT PROFILE ADULT - NSPRONUTRITIONRISK_GEN_A_NUR
Subjective:    Patient ID:  Jeimy Campbell is a 74 y.o. female who presents for follow-up of CAD, Hyperlipidemia and Hypertension      HPI  Admitted to [facility] last month with chest pain. Cardiac w/u (-), including stress nuke and echo  Doing ok now    Review of Systems   Constitution: Negative for decreased appetite, weakness, malaise/fatigue, weight gain and weight loss.   Cardiovascular: Negative for chest pain, dyspnea on exertion, leg swelling, palpitations and syncope.   Respiratory: Negative for cough and shortness of breath.    Gastrointestinal: Negative.    All other systems reviewed and are negative.       Objective:    Physical Exam   Constitutional: She is oriented to person, place, and time. She appears well-developed and well-nourished.   HENT:   Head: Normocephalic.   Eyes: Pupils are equal, round, and reactive to light.   Neck: Normal range of motion. Neck supple. No JVD present. Carotid bruit is not present. No thyromegaly present.   Cardiovascular: Normal rate, regular rhythm, normal heart sounds, intact distal pulses and normal pulses.  PMI is not displaced.  Exam reveals no gallop.    No murmur heard.  Pulmonary/Chest: Effort normal and breath sounds normal.   Abdominal: Soft. Normal appearance. She exhibits no mass. There is no hepatosplenomegaly. There is no tenderness.   Musculoskeletal: Normal range of motion. She exhibits no edema.   Neurological: She is alert and oriented to person, place, and time. She has normal strength and normal reflexes. No sensory deficit.   Skin: Skin is warm and intact.   Psychiatric: She has a normal mood and affect.   Nursing note and vitals reviewed.        Assessment:       1. S/P coronary artery stent placement    2. AVNRT (AV leesa re-entry tachycardia)    3. S/P carotid endarterectomy    4. Essential hypertension    5. Hypercholesteremia         Plan:     Continue all cardiac medications  Regular exercise program  Weight loss  6 m f/u        
No indicators present

## 2018-10-29 NOTE — SWALLOW BEDSIDE ASSESSMENT ADULT - ASR SWALLOW ASPIRATION MONITOR
gurgly voice/change of breathing pattern/fever/pneumonia/throat clearing/upper respiratory infection/cough

## 2018-10-29 NOTE — SWALLOW BEDSIDE ASSESSMENT ADULT - PHARYNGEAL PHASE
Within functional limits increased work of breathing post swallows of thin liquids which was remediated with use of nectar thickened liquids.

## 2018-10-30 DIAGNOSIS — N17.9 ACUTE KIDNEY FAILURE, UNSPECIFIED: ICD-10-CM

## 2018-10-30 LAB
BUN SERPL-MCNC: 32 MG/DL — HIGH (ref 7–23)
CALCIUM SERPL-MCNC: 9.6 MG/DL — SIGNIFICANT CHANGE UP (ref 8.4–10.5)
CHLORIDE SERPL-SCNC: 99 MMOL/L — SIGNIFICANT CHANGE UP (ref 98–107)
CK SERPL-CCNC: 905 U/L — HIGH (ref 25–170)
CO2 SERPL-SCNC: 19 MMOL/L — LOW (ref 22–31)
CREAT SERPL-MCNC: 1.65 MG/DL — HIGH (ref 0.5–1.3)
GLUCOSE SERPL-MCNC: 199 MG/DL — HIGH (ref 70–99)
HCT VFR BLD CALC: 32.8 % — LOW (ref 34.5–45)
HGB BLD-MCNC: 10.7 G/DL — LOW (ref 11.5–15.5)
MCHC RBC-ENTMCNC: 32.5 PG — SIGNIFICANT CHANGE UP (ref 27–34)
MCHC RBC-ENTMCNC: 32.6 % — SIGNIFICANT CHANGE UP (ref 32–36)
MCV RBC AUTO: 99.7 FL — SIGNIFICANT CHANGE UP (ref 80–100)
NRBC # FLD: 0 — SIGNIFICANT CHANGE UP
PLATELET # BLD AUTO: 274 K/UL — SIGNIFICANT CHANGE UP (ref 150–400)
PMV BLD: 9.8 FL — SIGNIFICANT CHANGE UP (ref 7–13)
POTASSIUM SERPL-MCNC: 4 MMOL/L — SIGNIFICANT CHANGE UP (ref 3.5–5.3)
POTASSIUM SERPL-SCNC: 4 MMOL/L — SIGNIFICANT CHANGE UP (ref 3.5–5.3)
RBC # BLD: 3.29 M/UL — LOW (ref 3.8–5.2)
RBC # FLD: 12.3 % — SIGNIFICANT CHANGE UP (ref 10.3–14.5)
SODIUM SERPL-SCNC: 136 MMOL/L — SIGNIFICANT CHANGE UP (ref 135–145)
WBC # BLD: 10.37 K/UL — SIGNIFICANT CHANGE UP (ref 3.8–10.5)
WBC # FLD AUTO: 10.37 K/UL — SIGNIFICANT CHANGE UP (ref 3.8–10.5)

## 2018-10-30 PROCEDURE — 99233 SBSQ HOSP IP/OBS HIGH 50: CPT

## 2018-10-30 PROCEDURE — 74230 X-RAY XM SWLNG FUNCJ C+: CPT | Mod: 26

## 2018-10-30 RX ORDER — SODIUM CHLORIDE 9 MG/ML
1000 INJECTION INTRAMUSCULAR; INTRAVENOUS; SUBCUTANEOUS
Qty: 0 | Refills: 0 | Status: DISCONTINUED | OUTPATIENT
Start: 2018-10-30 | End: 2018-10-31

## 2018-10-30 RX ORDER — METOPROLOL TARTRATE 50 MG
25 TABLET ORAL
Qty: 0 | Refills: 0 | Status: DISCONTINUED | OUTPATIENT
Start: 2018-10-30 | End: 2018-10-31

## 2018-10-30 RX ORDER — ATORVASTATIN CALCIUM 80 MG/1
10 TABLET, FILM COATED ORAL AT BEDTIME
Qty: 0 | Refills: 0 | Status: DISCONTINUED | OUTPATIENT
Start: 2018-10-30 | End: 2018-10-31

## 2018-10-30 RX ADMIN — HEPARIN SODIUM 5000 UNIT(S): 5000 INJECTION INTRAVENOUS; SUBCUTANEOUS at 06:34

## 2018-10-30 RX ADMIN — AMLODIPINE BESYLATE 5 MILLIGRAM(S): 2.5 TABLET ORAL at 06:34

## 2018-10-30 RX ADMIN — Medication 1: at 18:30

## 2018-10-30 RX ADMIN — Medication 1: at 13:38

## 2018-10-30 RX ADMIN — LOSARTAN POTASSIUM 50 MILLIGRAM(S): 100 TABLET, FILM COATED ORAL at 06:34

## 2018-10-30 RX ADMIN — Medication 25 MILLIGRAM(S): at 18:31

## 2018-10-30 RX ADMIN — Medication 81 MILLIGRAM(S): at 13:39

## 2018-10-30 RX ADMIN — ATORVASTATIN CALCIUM 10 MILLIGRAM(S): 80 TABLET, FILM COATED ORAL at 21:43

## 2018-10-30 RX ADMIN — HEPARIN SODIUM 5000 UNIT(S): 5000 INJECTION INTRAVENOUS; SUBCUTANEOUS at 13:39

## 2018-10-30 RX ADMIN — SODIUM CHLORIDE 75 MILLILITER(S): 9 INJECTION INTRAMUSCULAR; INTRAVENOUS; SUBCUTANEOUS at 13:42

## 2018-10-30 RX ADMIN — HEPARIN SODIUM 5000 UNIT(S): 5000 INJECTION INTRAVENOUS; SUBCUTANEOUS at 21:43

## 2018-10-30 NOTE — SWALLOW VFSS/MBS ASSESSMENT ADULT - COMMENTS
Patient seen upright in BLAYNE chair in radiology suite, radiologist present.  Patient alert and cooperative.

## 2018-10-30 NOTE — SWALLOW VFSS/MBS ASSESSMENT ADULT - PHARYNGEAL PHASE COMMENTS
No penetration or aspiration viewed pre swallow, during the swallow, or post swallow No penetration or aspiration viewed pre swallow, during the swallow, or post swallow.

## 2018-10-30 NOTE — PROGRESS NOTE ADULT - PROBLEM SELECTOR PLAN 1
- Acute rise in Cr to 1.65 from 1.14 on admission.  Possibly medication induced (started on Cozaar at admission), will d/c   - Check urine studies  - If Cr continues to rise, check renal sonogram in AM - Acute rise in Cr to 1.65 from 1.14 on admission.  Possibly medication induced (started on Cozaar at admission), will d/c   - Check urine studies  - If Cr continues to rise, check renal sonogram in AM  - Trial of IVF NS 75cc/hr x 12 hours

## 2018-10-30 NOTE — SWALLOW VFSS/MBS ASSESSMENT ADULT - DIAGNOSTIC IMPRESSIONS
Patient presented with functional oral stage for puree, solids, and thin liquids marked by adequate bolus formation, transfer, and clearance. Mild increased mastication for solids.  Patient presented with adequate pharyngeal stage dysphagia for puree, solids, and thin liquids marked by prompt swallow trigger, adequate hyolaryngeal elevation, adequate epiglottic deflection, adequate closure of the laryngeal vestibule, adequate pharyngeal clearance and no penetration/aspiration/stasis before, during, or after the swallow.

## 2018-10-31 ENCOUNTER — TRANSCRIPTION ENCOUNTER (OUTPATIENT)
Age: 83
End: 2018-10-31

## 2018-10-31 VITALS
SYSTOLIC BLOOD PRESSURE: 117 MMHG | WEIGHT: 176.37 LBS | HEART RATE: 78 BPM | RESPIRATION RATE: 18 BRPM | OXYGEN SATURATION: 97 % | DIASTOLIC BLOOD PRESSURE: 66 MMHG | TEMPERATURE: 98 F

## 2018-10-31 LAB
BUN SERPL-MCNC: 30 MG/DL — HIGH (ref 7–23)
CALCIUM SERPL-MCNC: 9.1 MG/DL — SIGNIFICANT CHANGE UP (ref 8.4–10.5)
CHLORIDE SERPL-SCNC: 103 MMOL/L — SIGNIFICANT CHANGE UP (ref 98–107)
CHLORIDE UR-SCNC: < 20 MMOL/L — SIGNIFICANT CHANGE UP
CK SERPL-CCNC: 583 U/L — HIGH (ref 25–170)
CO2 SERPL-SCNC: 21 MMOL/L — LOW (ref 22–31)
CREAT ?TM UR-MCNC: 108.3 MG/DL — SIGNIFICANT CHANGE UP
CREAT SERPL-MCNC: 1.45 MG/DL — HIGH (ref 0.5–1.3)
GLUCOSE SERPL-MCNC: 150 MG/DL — HIGH (ref 70–99)
HCT VFR BLD CALC: 31.1 % — LOW (ref 34.5–45)
HGB BLD-MCNC: 10.1 G/DL — LOW (ref 11.5–15.5)
MAGNESIUM SERPL-MCNC: 2 MG/DL — SIGNIFICANT CHANGE UP (ref 1.6–2.6)
MCHC RBC-ENTMCNC: 31.9 PG — SIGNIFICANT CHANGE UP (ref 27–34)
MCHC RBC-ENTMCNC: 32.5 % — SIGNIFICANT CHANGE UP (ref 32–36)
MCV RBC AUTO: 98.1 FL — SIGNIFICANT CHANGE UP (ref 80–100)
NRBC # FLD: 0 — SIGNIFICANT CHANGE UP
PLATELET # BLD AUTO: 260 K/UL — SIGNIFICANT CHANGE UP (ref 150–400)
PMV BLD: 9.8 FL — SIGNIFICANT CHANGE UP (ref 7–13)
POTASSIUM SERPL-MCNC: 4 MMOL/L — SIGNIFICANT CHANGE UP (ref 3.5–5.3)
POTASSIUM SERPL-SCNC: 4 MMOL/L — SIGNIFICANT CHANGE UP (ref 3.5–5.3)
POTASSIUM UR-SCNC: 30.6 MMOL/L — SIGNIFICANT CHANGE UP
RBC # BLD: 3.17 M/UL — LOW (ref 3.8–5.2)
RBC # FLD: 12.3 % — SIGNIFICANT CHANGE UP (ref 10.3–14.5)
SODIUM SERPL-SCNC: 138 MMOL/L — SIGNIFICANT CHANGE UP (ref 135–145)
SODIUM UR-SCNC: 27 MMOL/L — SIGNIFICANT CHANGE UP
UUN UR-MCNC: 482.3 MG/DL — SIGNIFICANT CHANGE UP
WBC # BLD: 8.05 K/UL — SIGNIFICANT CHANGE UP (ref 3.8–10.5)
WBC # FLD AUTO: 8.05 K/UL — SIGNIFICANT CHANGE UP (ref 3.8–10.5)

## 2018-10-31 PROCEDURE — 99239 HOSP IP/OBS DSCHRG MGMT >30: CPT

## 2018-10-31 RX ORDER — AMLODIPINE BESYLATE 2.5 MG/1
1 TABLET ORAL
Qty: 30 | Refills: 0
Start: 2018-10-31 | End: 2018-11-29

## 2018-10-31 RX ORDER — ACETAMINOPHEN 500 MG
2 TABLET ORAL
Qty: 0 | Refills: 0 | DISCHARGE
Start: 2018-10-31

## 2018-10-31 RX ORDER — ASPIRIN/CALCIUM CARB/MAGNESIUM 324 MG
1 TABLET ORAL
Qty: 0 | Refills: 0 | DISCHARGE
Start: 2018-10-31

## 2018-10-31 RX ORDER — AMLODIPINE BESYLATE 2.5 MG/1
1 TABLET ORAL
Qty: 0 | Refills: 0 | COMMUNITY

## 2018-10-31 RX ADMIN — AMLODIPINE BESYLATE 5 MILLIGRAM(S): 2.5 TABLET ORAL at 06:30

## 2018-10-31 RX ADMIN — Medication 25 MILLIGRAM(S): at 06:30

## 2018-10-31 RX ADMIN — Medication 1: at 13:11

## 2018-10-31 RX ADMIN — Medication 81 MILLIGRAM(S): at 13:11

## 2018-10-31 RX ADMIN — HEPARIN SODIUM 5000 UNIT(S): 5000 INJECTION INTRAVENOUS; SUBCUTANEOUS at 13:11

## 2018-10-31 RX ADMIN — HEPARIN SODIUM 5000 UNIT(S): 5000 INJECTION INTRAVENOUS; SUBCUTANEOUS at 06:30

## 2018-10-31 NOTE — DISCHARGE NOTE ADULT - CARE PLAN
Principal Discharge DX:	Fall  Goal:	To prevent or reduce the incidence of falls and injuries.  To increase mobility and function and environmental safety.  Assessment and plan of treatment:	Fall precautions: keep your area clutter free, place night lights in hallways and stairwells, and add non-slip mats in the kitchen and bathrooms.  Exercise daily to improve muscle strength to avoid deconditioning.  Secondary Diagnosis:	Acute kidney injury  Goal:	To prevent shortness of breath, fluid overload, and electrolytes imbalance, and to slow down worsening kidney disease.  Assessment and plan of treatment:	Continue blood pressure, cholesterol and diabetic medications. Goal of hemoglobin A1C (HgbA1C) < 7%.  Avoid nephrotoxic drugs such as nonsteroidal anti-inflammatory agents (NSAIDs).   Please follow up with your nephrologist to monitor your kidney function, continue with low protein and potassium diet.  Secondary Diagnosis:	Dementia  Goal:	be caution, will need observation as tolerated and with assistance times  Secondary Diagnosis:	HTN (hypertension)  Goal:	To maintain a normal blood pressure to prevent heart attack, stroke and renal failure.  Assessment and plan of treatment:	Low sodium and fat diet, continue anti-hypertensive medications, and follow up with primary care physician.

## 2018-10-31 NOTE — PROGRESS NOTE ADULT - PROBLEM SELECTOR PLAN 7
DVT Prophylaxis: SQ Heparin  Dispo: LARISA, currently undergoing workup of MAGALIE.
DVT Prophylaxis: SQ Heparin  Dispo: Son declines LARISA, requesitng home with home care services.  Medically stable for discharge.  D/C Time: 32 minutes including discussion of medications

## 2018-10-31 NOTE — PROGRESS NOTE ADULT - PROBLEM SELECTOR PLAN 2
- Likely mechanical fall from debility/chronic osteoarthralgia in R knee  - Pt does not need TTE  - CT head negative for structural abnormalities to explain falls, no hemorrhage   - Repeat CPK pending
- Likely mechanical fall from debility/chronic osteoarthralgia in R knee  - Pt does not need TTE  - CT head negative for structural abnormalities to explain falls, no hemorrhage   - Repeat CPK downtrending
- Med rec currently being completed   - As per previous med rec from prior hospitalization, pt was discharged home on Brian 5/20, will resume Amlodipine and Cozaar for now

## 2018-10-31 NOTE — DISCHARGE NOTE ADULT - PATIENT PORTAL LINK FT
You can access the [facility] Patient Portal, offered by [facility], by registering with the following website: [email or web address]

## 2018-10-31 NOTE — DISCHARGE NOTE ADULT - HOSPITAL COURSE
89F mild dementia, T2DM, HTN, OA, Obesity, Anemia, p/w fall at home.  CT head was negative for hemorrhage, orthostatics were negative, EKG was largely unremarkable (1st degree AV block), fall was determined to be likely mechanical in nature. Fall was unlikely to be cardiogenic or neurogenic in nature.  Course was c/b MAGALIE for which was prerenal as per urine studies, which resolved by time of discharge.  PT evaluated patient and recommended LARISA, however son declined and requested home care services. 89F mild dementia, T2DM, HTN, OA, Obesity, Anemia, p/w fall at home.  CT head was negative for hemorrhage, orthostatics were negative, EKG was largely unremarkable (1st degree AV block), fall was determined to be likely mechanical in nature. Fall was unlikely to be cardiogenic or neurogenic in nature.  Course was c/b MAGALIE for which was prerenal as per urine studies, which resolved by time of discharge.  PT evaluated patient and recommended LARISA, however son declined and requested home care services.   89F mild dementia, T2DM, HTN, OA, Obesity, Anemia,  p/w mechanical fall at home, course now complicated by MAGALIE. 	    Acute kidney injury.  Plan: - MAGALIE improving now, continue to hold Cozaar  - Fractional excretion of Urea suggestive of prerenal disease, now improving with trial of IVF  - Encouraged PO fluid intake.Creatinine is trending downward   Fall.  Plan: - Likely mechanical fall from debility/chronic osteoarthralgia in R knee  - Pt does not need TTE  - CT head negative for structural abnormalities to explain falls, no hemorrhage   - Repeat CPK downtrending. HTN (hypertension).  Plan: - Pt may resume home BP meds on discharge  - BP otherwise well controlled. DM (diabetes mellitus).  Plan: - HbA1c at goal 6.2   - Continue with HISS for now. Dementia.  Plan: - Mild dementia, pt able to tell me full history, knows her name, , where she is, unable to recall certain items such as her pharmacy.   OA (osteoarthritis). Plan: - Tylenol PRN.  Patient is hemodynamically stable and without complaints and patient is ready for discharge.  Case discussed and medications reviewed with PMD.  Agreed with above.

## 2018-10-31 NOTE — PROGRESS NOTE ADULT - ASSESSMENT
89F mild dementia, T2DM, HTN, OA, Obesity, Anemia,  p/w mechanical fall at home, course now complicated by MAGALIE.
89F mild dementia, T2DM, HTN, OA, Obesity, Anemia,  p/w mechanical fall at home, course now complicated by MAGALIE.
89F mild dementia, T2DM, HTN, OA, Obesity, Anemia,  p/w mechanical fall at home

## 2018-10-31 NOTE — PROGRESS NOTE ADULT - SUBJECTIVE AND OBJECTIVE BOX
Patient is a 89y old  Female who presents with a chief complaint of Fall at home (30 Oct 2018 12:12)    SUBJECTIVE / OVERNIGHT EVENTS:  Kyrgyz Creole  #[id number] used.  Pt denies any acute complaints, feels well.  Able to use walker with ambulation     MEDICATIONS  (STANDING):  amLODIPine   Tablet 5 milliGRAM(s) Oral daily  aspirin enteric coated 81 milliGRAM(s) Oral daily  atorvastatin 10 milliGRAM(s) Oral at bedtime  dextrose 5%. 1000 milliLiter(s) (50 mL/Hr) IV Continuous <Continuous>  dextrose 50% Injectable 12.5 Gram(s) IV Push once  dextrose 50% Injectable 25 Gram(s) IV Push once  dextrose 50% Injectable 25 Gram(s) IV Push once  heparin  Injectable 5000 Unit(s) SubCutaneous every 8 hours  insulin lispro (HumaLOG) corrective regimen sliding scale   SubCutaneous three times a day before meals  insulin lispro (HumaLOG) corrective regimen sliding scale   SubCutaneous at bedtime  metoprolol tartrate 25 milliGRAM(s) Oral two times a day  sodium chloride 0.9%. 1000 milliLiter(s) (75 mL/Hr) IV Continuous <Continuous>    MEDICATIONS  (PRN):  acetaminophen   Tablet .. 650 milliGRAM(s) Oral every 8 hours PRN Moderate Pain (4 - 6), Severe Pain (7 - 10)  dextrose 40% Gel 15 Gram(s) Oral once PRN Blood Glucose LESS THAN 70 milliGRAM(s)/deciliter  glucagon  Injectable 1 milliGRAM(s) IntraMuscular once PRN Glucose LESS THAN 70 milligrams/deciliter      Vital Signs Last 24 Hrs  T(C): 36.4 (31 Oct 2018 06:04), Max: 36.8 (30 Oct 2018 20:39)  T(F): 97.5 (31 Oct 2018 06:04), Max: 98.2 (30 Oct 2018 20:39)  HR: 78 (31 Oct 2018 06:04) (78 - 97)  BP: 117/66 (31 Oct 2018 06:04) (117/66 - 128/67)  BP(mean): --  RR: 18 (31 Oct 2018 06:04) (17 - 18)  SpO2: 97% (31 Oct 2018 06:04) (97% - 100%)  CAPILLARY BLOOD GLUCOSE      POCT Blood Glucose.: 150 mg/dL (31 Oct 2018 09:13)  POCT Blood Glucose.: 147 mg/dL (30 Oct 2018 22:29)  POCT Blood Glucose.: 160 mg/dL (30 Oct 2018 18:10)  POCT Blood Glucose.: 172 mg/dL (30 Oct 2018 12:57)    I&O's Summary    30 Oct 2018 07:01  -  31 Oct 2018 07:00  --------------------------------------------------------  IN: 0 mL / OUT: 250 mL / NET: -250 mL    31 Oct 2018 07:01  -  31 Oct 2018 11:31  --------------------------------------------------------  IN: 0 mL / OUT: 200 mL / NET: -200 mL      PHYSICAL EXAM  GENERAL: NAD, well-developed  HEAD:  Atraumatic, Normocephalic  EYES: EOMI, PERRLA, conjunctiva and sclera clear  NECK: Supple, No JVD  CHEST/LUNG: Clear to auscultation bilaterally; No wheeze  HEART: Regular rate and rhythm; No murmurs, rubs, or gallops  ABDOMEN: Soft, Nontender, Nondistended; Bowel sounds present  EXTREMITIES:  2+ Peripheral Pulses, No clubbing, cyanosis, or edema  PSYCH: AAOx2  SKIN: No rashes or lesions    LABS:                        10.1   8.05  )-----------( 260      ( 31 Oct 2018 06:00 )             31.1     10-31    138  |  103  |  30<H>  ----------------------------<  150<H>  4.0   |  21<L>  |  1.45<H>    Ca    9.1      31 Oct 2018 06:00  Mg     2.0     10-31        CARDIAC MARKERS ( 31 Oct 2018 06:00 )  x     / x     / 583 u/L / x     / x      CARDIAC MARKERS ( 30 Oct 2018 10:00 )  x     / x     / 905 u/L / x     / x
Patient is a 89y old  Female who presents with a chief complaint of Fall at home (28 Oct 2018 20:23)      SUBJECTIVE / OVERNIGHT EVENTS: Pt able to recall events surrounding fall: states that she was at home with her home attendant and noticed the stove burner was on too high, so she got up abruptly, loss balance and fell.  States that her current walker doesn't fit her appropriately and is requesting a new one.  Denies chest pain, SOB, dizziness; has chronic R knee pain.       MEDICATIONS  (STANDING):  amLODIPine   Tablet 5 milliGRAM(s) Oral daily  aspirin enteric coated 81 milliGRAM(s) Oral daily  dextrose 5%. 1000 milliLiter(s) (50 mL/Hr) IV Continuous <Continuous>  dextrose 50% Injectable 12.5 Gram(s) IV Push once  dextrose 50% Injectable 25 Gram(s) IV Push once  dextrose 50% Injectable 25 Gram(s) IV Push once  heparin  Injectable 5000 Unit(s) SubCutaneous every 8 hours  insulin lispro (HumaLOG) corrective regimen sliding scale   SubCutaneous three times a day before meals  insulin lispro (HumaLOG) corrective regimen sliding scale   SubCutaneous at bedtime  losartan 50 milliGRAM(s) Oral daily    MEDICATIONS  (PRN):  acetaminophen   Tablet .. 650 milliGRAM(s) Oral every 8 hours PRN Moderate Pain (4 - 6), Severe Pain (7 - 10)  dextrose 40% Gel 15 Gram(s) Oral once PRN Blood Glucose LESS THAN 70 milliGRAM(s)/deciliter  glucagon  Injectable 1 milliGRAM(s) IntraMuscular once PRN Glucose LESS THAN 70 milligrams/deciliter      Vital Signs Last 24 Hrs  T(C): 36.6 (29 Oct 2018 11:57), Max: 36.9 (29 Oct 2018 03:27)  T(F): 97.9 (29 Oct 2018 11:57), Max: 98.5 (29 Oct 2018 03:27)  HR: 97 (29 Oct 2018 11:57) (88 - 104)  BP: 156/71 (29 Oct 2018 11:57) (123/69 - 197/90)  BP(mean): --  RR: 18 (29 Oct 2018 11:57) (16 - 20)  SpO2: 98% (29 Oct 2018 11:57) (96% - 100%)  CAPILLARY BLOOD GLUCOSE      POCT Blood Glucose.: 168 mg/dL (29 Oct 2018 13:27)  POCT Blood Glucose.: 159 mg/dL (29 Oct 2018 08:46)  POCT Blood Glucose.: 149 mg/dL (28 Oct 2018 21:45)      PHYSICAL EXAM  GENERAL: NAD, well-developed  HEAD:  Atraumatic, Normocephalic  EYES: EOMI, PERRLA, conjunctiva and sclera clear  NECK: Supple, No JVD  CHEST/LUNG: Clear to auscultation bilaterally; No wheeze  HEART: Regular rate and rhythm; No murmurs, rubs, or gallops  ABDOMEN: Soft, Nontender, Nondistended; Bowel sounds present  EXTREMITIES:  2+ Peripheral Pulses, No clubbing, cyanosis, or edema, limited ROM of R knee 2/2 chronic pain   PSYCH: AAOx2-3  SKIN: No rashes or lesions    LABS:                        10.5   9.25  )-----------( 289      ( 29 Oct 2018 07:45 )             32.0     10-    141  |  104  |  18  ----------------------------<  169<H>  4.0   |  23  |  1.26    Ca    10.2      29 Oct 2018 07:45  Phos  4.0     10-  Mg     1.9     10-    TPro  8.1  /  Alb  4.1  /  TBili  0.5  /  DBili  x   /  AST  29  /  ALT  14  /  AlkPhos  73  10-    PT/INR - ( 29 Oct 2018 07:45 )   PT: 11.9 SEC;   INR: 1.07          PTT - ( 29 Oct 2018 07:45 )  PTT:30.4 SEC  CARDIAC MARKERS ( 29 Oct 2018 07:45 )  x     / x     / 917 u/L / x     / x          Urinalysis Basic - ( 28 Oct 2018 10:50 )    Color: COLORLESS / Appearance: CLEAR / S.008 / pH: 6.5  Gluc: NEGATIVE / Ketone: NEGATIVE  / Bili: NEGATIVE / Urobili: NORMAL   Blood: NEGATIVE / Protein: NEGATIVE / Nitrite: NEGATIVE   Leuk Esterase: NEGATIVE / RBC: x / WBC x   Sq Epi: x / Non Sq Epi: x / Bacteria: x        RADIOLOGY & ADDITIONAL TESTS:    Imaging Personally Reviewed:  < from: Xray Chest 1 View- PORTABLE-Urgent (10.28.18 @ 13:39) >  IMPRESSION: The study is limited due to poor aspiration.  The lungs are clear however, PA and lateral x-rays of the chest can be   performed for complete evaluation as clinically warranted. No pleural   effusions or pneumothorax. Heart size cannot accurately be assessed in   this projection. No acute osseous abnormality. Degenerative changes of   the spine and the shoulders.    < end of copied text >    < from: Xray Pelvis 2 views (10.28.18 @ 13:27) >  IMPRESSION: The study is limited secondary to patient's body habitus and   rotation.  Multiple surgical clips overlying the pelvis. No acute displaced   fractures or dislocations. No radiopaque foreign body. Vascular   calcifications of thebilateral lower extremities. There are degenerative   changes of the symphysis pubis and the lumbar spine.    < end of copied text >    < from: CT Abdomen and Pelvis w/ IV Cont (10.28.18 @ 17:29) >    IMPRESSION:     1.  No bowel obstruction.  2.  Normal appendix.    < end of copied text >
Patient is a 89y old  Female who presents with a chief complaint of Fall at home (29 Oct 2018 14:03)      SUBJECTIVE / OVERNIGHT EVENTS: No major overnight events, no tele events noted, patient to go for cinesophogram today.       MEDICATIONS  (STANDING):  amLODIPine   Tablet 5 milliGRAM(s) Oral daily  aspirin enteric coated 81 milliGRAM(s) Oral daily  atorvastatin 10 milliGRAM(s) Oral at bedtime  dextrose 5%. 1000 milliLiter(s) (50 mL/Hr) IV Continuous <Continuous>  dextrose 50% Injectable 12.5 Gram(s) IV Push once  dextrose 50% Injectable 25 Gram(s) IV Push once  dextrose 50% Injectable 25 Gram(s) IV Push once  heparin  Injectable 5000 Unit(s) SubCutaneous every 8 hours  insulin lispro (HumaLOG) corrective regimen sliding scale   SubCutaneous three times a day before meals  insulin lispro (HumaLOG) corrective regimen sliding scale   SubCutaneous at bedtime  metoprolol tartrate 25 milliGRAM(s) Oral two times a day    MEDICATIONS  (PRN):  acetaminophen   Tablet .. 650 milliGRAM(s) Oral every 8 hours PRN Moderate Pain (4 - 6), Severe Pain (7 - 10)  dextrose 40% Gel 15 Gram(s) Oral once PRN Blood Glucose LESS THAN 70 milliGRAM(s)/deciliter  glucagon  Injectable 1 milliGRAM(s) IntraMuscular once PRN Glucose LESS THAN 70 milligrams/deciliter      Vital Signs Last 24 Hrs  T(C): 36.4 (30 Oct 2018 06:33), Max: 36.4 (29 Oct 2018 19:50)  T(F): 97.6 (30 Oct 2018 06:33), Max: 97.6 (29 Oct 2018 19:50)  HR: 104 (30 Oct 2018 06:33) (97 - 104)  BP: 122/65 (30 Oct 2018 06:33) (122/65 - 136/65)  BP(mean): --  RR: 18 (30 Oct 2018 06:33) (18 - 18)  SpO2: 98% (30 Oct 2018 06:33) (96% - 98%)  CAPILLARY BLOOD GLUCOSE      POCT Blood Glucose.: 160 mg/dL (30 Oct 2018 08:50)  POCT Blood Glucose.: 182 mg/dL (29 Oct 2018 22:37)  POCT Blood Glucose.: 216 mg/dL (29 Oct 2018 18:14)  POCT Blood Glucose.: 168 mg/dL (29 Oct 2018 13:27)    I&O's Summary    29 Oct 2018 07:01  -  30 Oct 2018 07:00  --------------------------------------------------------  IN: 0 mL / OUT: 350 mL / NET: -350 mL      PHYSICAL EXAM  GENERAL: NAD, well-developed, obese  HEAD:  Atraumatic, Normocephalic  EYES: EOMI, PERRLA, conjunctiva and sclera clear  NECK: Supple, No JVD  CHEST/LUNG: Clear to auscultation bilaterally; No wheeze  HEART: Regular rate and rhythm; No murmurs, rubs, or gallops  ABDOMEN: Soft, Nontender, Nondistended; Bowel sounds present  EXTREMITIES:  2+ Peripheral Pulses, No clubbing, cyanosis, or edema  PSYCH: AAOx2  SKIN: No rashes or lesions    LABS:                        10.7   10.37 )-----------( 274      ( 30 Oct 2018 10:00 )             32.8     10-30    136  |  99  |  32<H>  ----------------------------<  199<H>  4.0   |  19<L>  |  1.65<H>    Ca    9.6      30 Oct 2018 10:00  Phos  4.0     10-29  Mg     1.9     10-29    TPro  8.1  /  Alb  4.1  /  TBili  0.5  /  DBili  x   /  AST  29  /  ALT  14  /  AlkPhos  73  10-29    PT/INR - ( 29 Oct 2018 07:45 )   PT: 11.9 SEC;   INR: 1.07          PTT - ( 29 Oct 2018 07:45 )  PTT:30.4 SEC  CARDIAC MARKERS ( 29 Oct 2018 07:45 )  x     / x     / 917 u/L / x     / x          RADIOLOGY & ADDITIONAL TESTS:    Imaging Personally Reviewed:  < from: Xray Pelvis 2 views (10.28.18 @ 13:27) >  IMPRESSION: The study is limited secondary to patient's body habitus and   rotation.  Multiple surgical clips overlying the pelvis. No acute displaced   fractures or dislocations. No radiopaque foreign body. Vascular   calcifications of thebilateral lower extremities. There are degenerative   changes of the symphysis pubis and the lumbar spine.    < end of copied text >

## 2018-10-31 NOTE — DISCHARGE NOTE ADULT - OTHER SIGNIFICANT FINDINGS
Trop: 34 -->26  CPK: 917-->905  UA negative  10/28 CT Head No Cont  No acute intracranial hemorrhage, mass effect, or shift of   the midline structures. Similar-appearing moderate severity microvascular disease.  10/28 CT abd/pel - No bowel obstruction. Normal appendix.

## 2018-10-31 NOTE — PROGRESS NOTE ADULT - PROBLEM SELECTOR PLAN 4
- HbA1c at goal 6.2   - Continue with HISS for now
- HbA1c at goal 6.2   - Continue with HISS for now
- Mild dementia, pt able to tell me full history, knows her name, , where she is, unable to recall certain items such as her pharmacy

## 2018-10-31 NOTE — PROGRESS NOTE ADULT - PROBLEM SELECTOR PLAN 6
- PT recommends LARISA   - Tylenol PRN
- Tylenol PRN
DVT Prophylaxis: SQ Heparin  PT consult pending

## 2018-10-31 NOTE — PROGRESS NOTE ADULT - PROBLEM SELECTOR PLAN 1
- MAGALIE improving now, continue to hold Cozaar  - Fractional excretion of Urea suggestive of prerenal disease, now improving with trial of IVF  - Encouraged PO fluid intake

## 2018-10-31 NOTE — PROGRESS NOTE ADULT - PROBLEM SELECTOR PLAN 3
Lance Mary from [facility] in technical services, reports pt scheduled for and echo and halter monitor for Monday, requesting a prior auth for insurance on the halter monitor. MD routed.
- Reviewed med rec, pt was on Amlodipine 10mg and Bystolic 2.5mg at home (will resume current Amlodipine.  We do not carry Bystolic on formulary, will start lopressor 25mg BID, pt likely with mild rebound tachycardia while off BB)   - BP otherwise well controlled
- HbA1c at goal 6.2 (  - Continue with HISS for now
- Pt may resume home BP meds on discharge  - BP otherwise well controlled

## 2018-10-31 NOTE — DISCHARGE NOTE ADULT - MEDICATION SUMMARY - MEDICATIONS TO TAKE
I will START or STAY ON the medications listed below when I get home from the hospital:    acetaminophen 325 mg oral tablet  -- 2 tab(s) by mouth every 8 hours, As needed, Moderate Pain (4 - 6), Severe Pain (7 - 10)  -- Indication: For Pain    aspirin 81 mg oral delayed release tablet  -- 1 tab(s) by mouth once a day  -- Indication: For cv protection    pravastatin 40 mg oral tablet  -- 1 tab(s) by mouth once a day  -- Indication: For cholesterol    Bystolic 2.5 mg oral tablet  -- 1 tab(s) by mouth once a day  -- Indication: For cad/htn    amLODIPine 5 mg oral tablet  -- 1 tab(s) by mouth once a day  -- Indication: For HTN (hypertension)

## 2018-10-31 NOTE — DISCHARGE NOTE ADULT - PROVIDER TOKENS
FREE:[LAST:[PMD],PHONE:[(   )    -],FAX:[(   )    -],ADDRESS:[Please follow up with PMD as outaptient]]

## 2018-10-31 NOTE — DISCHARGE NOTE ADULT - INSTRUCTIONS
low salt &  low cholesterol , dysphagia three Diet, nectar thick fluid, no sugar added, carbohydrate consistent

## 2018-10-31 NOTE — PROGRESS NOTE ADULT - PROBLEM SELECTOR PLAN 5
- Mild dementia, pt able to tell me full history, knows her name, , where she is, unable to recall certain items such as her pharmacy
- Mild dementia, pt able to tell me full history, knows her name, , where she is, unable to recall certain items such as her pharmacy
- PT consult pending   - Tylenol PRN

## 2018-10-31 NOTE — DISCHARGE NOTE ADULT - PLAN OF CARE
To prevent or reduce the incidence of falls and injuries.  To increase mobility and function and environmental safety. Fall precautions: keep your area clutter free, place night lights in hallways and stairwells, and add non-slip mats in the kitchen and bathrooms.  Exercise daily to improve muscle strength to avoid deconditioning. To prevent shortness of breath, fluid overload, and electrolytes imbalance, and to slow down worsening kidney disease. Continue blood pressure, cholesterol and diabetic medications. Goal of hemoglobin A1C (HgbA1C) < 7%.  Avoid nephrotoxic drugs such as nonsteroidal anti-inflammatory agents (NSAIDs).   Please follow up with your nephrologist to monitor your kidney function, continue with low protein and potassium diet. be caution, will need observation as tolerated and with assistance times To maintain a normal blood pressure to prevent heart attack, stroke and renal failure. Low sodium and fat diet, continue anti-hypertensive medications, and follow up with primary care physician.

## 2018-11-01 ENCOUNTER — OUTPATIENT (OUTPATIENT)
Dept: OUTPATIENT SERVICES | Facility: HOSPITAL | Age: 83
LOS: 1 days | End: 2018-11-01
Payer: MEDICARE

## 2018-11-01 DIAGNOSIS — D25.9 LEIOMYOMA OF UTERUS, UNSPECIFIED: Chronic | ICD-10-CM

## 2018-11-01 DIAGNOSIS — Z90.710 ACQUIRED ABSENCE OF BOTH CERVIX AND UTERUS: Chronic | ICD-10-CM

## 2018-11-01 PROCEDURE — G9001: CPT

## 2018-11-05 DIAGNOSIS — Z71.89 OTHER SPECIFIED COUNSELING: ICD-10-CM

## 2018-11-05 PROBLEM — E66.9 OBESITY, UNSPECIFIED: Chronic | Status: ACTIVE | Noted: 2018-10-28

## 2018-11-05 PROBLEM — F03.90 UNSPECIFIED DEMENTIA WITHOUT BEHAVIORAL DISTURBANCE: Chronic | Status: ACTIVE | Noted: 2018-10-28

## 2018-11-05 PROBLEM — M19.90 UNSPECIFIED OSTEOARTHRITIS, UNSPECIFIED SITE: Chronic | Status: ACTIVE | Noted: 2018-10-28

## 2019-01-26 ENCOUNTER — EMERGENCY (EMERGENCY)
Facility: HOSPITAL | Age: 84
LOS: 1 days | Discharge: HOME | End: 2019-01-26
Attending: EMERGENCY MEDICINE | Admitting: EMERGENCY MEDICINE
Payer: MEDICARE

## 2019-01-26 VITALS
SYSTOLIC BLOOD PRESSURE: 154 MMHG | OXYGEN SATURATION: 100 % | RESPIRATION RATE: 18 BRPM | TEMPERATURE: 98 F | HEART RATE: 88 BPM | DIASTOLIC BLOOD PRESSURE: 88 MMHG

## 2019-01-26 VITALS
SYSTOLIC BLOOD PRESSURE: 154 MMHG | HEART RATE: 83 BPM | RESPIRATION RATE: 18 BRPM | TEMPERATURE: 97 F | OXYGEN SATURATION: 100 % | DIASTOLIC BLOOD PRESSURE: 83 MMHG

## 2019-01-26 DIAGNOSIS — Z90.710 ACQUIRED ABSENCE OF BOTH CERVIX AND UTERUS: Chronic | ICD-10-CM

## 2019-01-26 DIAGNOSIS — D25.9 LEIOMYOMA OF UTERUS, UNSPECIFIED: Chronic | ICD-10-CM

## 2019-01-26 LAB
ALBUMIN SERPL ELPH-MCNC: 4.3 G/DL — SIGNIFICANT CHANGE UP (ref 3.3–5)
ALP SERPL-CCNC: 67 U/L — SIGNIFICANT CHANGE UP (ref 40–120)
ALT FLD-CCNC: 9 U/L — SIGNIFICANT CHANGE UP (ref 4–33)
ANION GAP SERPL CALC-SCNC: 13 MMO/L — SIGNIFICANT CHANGE UP (ref 7–14)
APPEARANCE UR: SIGNIFICANT CHANGE UP
AST SERPL-CCNC: 33 U/L — HIGH (ref 4–32)
BACTERIA # UR AUTO: SIGNIFICANT CHANGE UP
BASOPHILS # BLD AUTO: 0.06 K/UL — SIGNIFICANT CHANGE UP (ref 0–0.2)
BASOPHILS NFR BLD AUTO: 0.6 % — SIGNIFICANT CHANGE UP (ref 0–2)
BILIRUB SERPL-MCNC: 0.5 MG/DL — SIGNIFICANT CHANGE UP (ref 0.2–1.2)
BILIRUB UR-MCNC: NEGATIVE — SIGNIFICANT CHANGE UP
BLOOD UR QL VISUAL: NEGATIVE — SIGNIFICANT CHANGE UP
BUN SERPL-MCNC: 14 MG/DL — SIGNIFICANT CHANGE UP (ref 7–23)
CALCIUM SERPL-MCNC: 10.1 MG/DL — SIGNIFICANT CHANGE UP (ref 8.4–10.5)
CHLORIDE SERPL-SCNC: 104 MMOL/L — SIGNIFICANT CHANGE UP (ref 98–107)
CK SERPL-CCNC: 200 U/L — HIGH (ref 25–170)
CO2 SERPL-SCNC: 22 MMOL/L — SIGNIFICANT CHANGE UP (ref 22–31)
COLOR SPEC: YELLOW — SIGNIFICANT CHANGE UP
CREAT SERPL-MCNC: 1.02 MG/DL — SIGNIFICANT CHANGE UP (ref 0.5–1.3)
EOSINOPHIL # BLD AUTO: 0.04 K/UL — SIGNIFICANT CHANGE UP (ref 0–0.5)
EOSINOPHIL NFR BLD AUTO: 0.4 % — SIGNIFICANT CHANGE UP (ref 0–6)
EPI CELLS # UR: SIGNIFICANT CHANGE UP
GLUCOSE SERPL-MCNC: 147 MG/DL — HIGH (ref 70–99)
GLUCOSE UR-MCNC: NEGATIVE — SIGNIFICANT CHANGE UP
HCT VFR BLD CALC: 32.9 % — LOW (ref 34.5–45)
HGB BLD-MCNC: 10.4 G/DL — LOW (ref 11.5–15.5)
IMM GRANULOCYTES NFR BLD AUTO: 0.2 % — SIGNIFICANT CHANGE UP (ref 0–1.5)
KETONES UR-MCNC: NEGATIVE — SIGNIFICANT CHANGE UP
LEUKOCYTE ESTERASE UR-ACNC: SIGNIFICANT CHANGE UP
LYMPHOCYTES # BLD AUTO: 3.4 K/UL — HIGH (ref 1–3.3)
LYMPHOCYTES # BLD AUTO: 35.5 % — SIGNIFICANT CHANGE UP (ref 13–44)
MCHC RBC-ENTMCNC: 31.6 % — LOW (ref 32–36)
MCHC RBC-ENTMCNC: 32.3 PG — SIGNIFICANT CHANGE UP (ref 27–34)
MCV RBC AUTO: 102.2 FL — HIGH (ref 80–100)
MONOCYTES # BLD AUTO: 0.53 K/UL — SIGNIFICANT CHANGE UP (ref 0–0.9)
MONOCYTES NFR BLD AUTO: 5.5 % — SIGNIFICANT CHANGE UP (ref 2–14)
NEUTROPHILS # BLD AUTO: 5.54 K/UL — SIGNIFICANT CHANGE UP (ref 1.8–7.4)
NEUTROPHILS NFR BLD AUTO: 57.8 % — SIGNIFICANT CHANGE UP (ref 43–77)
NITRITE UR-MCNC: NEGATIVE — SIGNIFICANT CHANGE UP
NRBC # FLD: 0 K/UL — LOW (ref 25–125)
PH UR: 6.5 — SIGNIFICANT CHANGE UP (ref 5–8)
PLATELET # BLD AUTO: 274 K/UL — SIGNIFICANT CHANGE UP (ref 150–400)
PMV BLD: 10.4 FL — SIGNIFICANT CHANGE UP (ref 7–13)
POTASSIUM SERPL-MCNC: 5.2 MMOL/L — SIGNIFICANT CHANGE UP (ref 3.5–5.3)
POTASSIUM SERPL-SCNC: 5.2 MMOL/L — SIGNIFICANT CHANGE UP (ref 3.5–5.3)
PROT SERPL-MCNC: 8.2 G/DL — SIGNIFICANT CHANGE UP (ref 6–8.3)
PROT UR-MCNC: SIGNIFICANT CHANGE UP
RBC # BLD: 3.22 M/UL — LOW (ref 3.8–5.2)
RBC # FLD: 12.8 % — SIGNIFICANT CHANGE UP (ref 10.3–14.5)
RBC CASTS # UR COMP ASSIST: SIGNIFICANT CHANGE UP (ref 0–?)
SODIUM SERPL-SCNC: 139 MMOL/L — SIGNIFICANT CHANGE UP (ref 135–145)
SP GR SPEC: 1.02 — SIGNIFICANT CHANGE UP (ref 1–1.04)
UROBILINOGEN FLD QL: 0.2 — SIGNIFICANT CHANGE UP
WBC # BLD: 9.59 K/UL — SIGNIFICANT CHANGE UP (ref 3.8–10.5)
WBC # FLD AUTO: 9.59 K/UL — SIGNIFICANT CHANGE UP (ref 3.8–10.5)
WBC UR QL: > 50 — SIGNIFICANT CHANGE UP (ref 0–?)

## 2019-01-26 PROCEDURE — 72125 CT NECK SPINE W/O DYE: CPT | Mod: 26

## 2019-01-26 PROCEDURE — 72100 X-RAY EXAM L-S SPINE 2/3 VWS: CPT | Mod: 26

## 2019-01-26 PROCEDURE — 70450 CT HEAD/BRAIN W/O DYE: CPT | Mod: 26

## 2019-01-26 PROCEDURE — 99284 EMERGENCY DEPT VISIT MOD MDM: CPT | Mod: GC

## 2019-01-26 RX ORDER — CEFTRIAXONE 500 MG/1
1 INJECTION, POWDER, FOR SOLUTION INTRAMUSCULAR; INTRAVENOUS ONCE
Qty: 0 | Refills: 0 | Status: COMPLETED | OUTPATIENT
Start: 2019-01-26 | End: 2019-01-26

## 2019-01-26 RX ORDER — CEPHALEXIN 500 MG
500 CAPSULE ORAL ONCE
Qty: 0 | Refills: 0 | Status: DISCONTINUED | OUTPATIENT
Start: 2019-01-26 | End: 2019-01-26

## 2019-01-26 RX ORDER — CEPHALEXIN 500 MG
1 CAPSULE ORAL
Qty: 14 | Refills: 0
Start: 2019-01-26 | End: 2019-02-01

## 2019-01-26 RX ADMIN — CEFTRIAXONE 100 GRAM(S): 500 INJECTION, POWDER, FOR SOLUTION INTRAMUSCULAR; INTRAVENOUS at 14:09

## 2019-01-26 NOTE — PROVIDER CONTACT NOTE (OTHER) - ASSESSMENT
GABRIELE contacted by Pt's MD who states Pt with dementia, Creole speaking here with HHA, for fall, frequent falls, Pt alone at night.   GABRIELE contacted Pt's son Pavan Hassan [phone] who states his mother receives 10 hours, 7 days of HHA but up to this point stays alone at night. SW explained that in order to obtain additional hours son needs to contact her MD and medicaid for evaluation for additional hours in the home. Pt's Son understands he needs to contact them directly to make change.  He states that family will stay with her at night until they resolve HHA hours issue.  GABRIELE informed Medical Team.  SW received call that Pt is cleared for D/C and requires ambulance transport to return home with HHA.  SW contacted [facility] [phone] spoke with Chidi who confirmed ambulance by 3pm.  Pt, family and medical staff are aware and in agreement with plan. All questions or concerns were addressed to satisfaction, No further SW intervention required at this time.

## 2019-01-26 NOTE — ED ADULT TRIAGE NOTE - CHIEF COMPLAINT QUOTE
PMH of dementia, DM, HTN and back pain. Pt s/p fall, found on floor by aid at 0900. Pt c/o acute on chronic lower back pain otherwise denies complaints. Pt ambulatory with assistance.

## 2019-01-26 NOTE — ED PROVIDER NOTE - MEDICAL DECISION MAKING DETAILS
A/P 89 yo F s/p unwitnessed fall pt is a poor historian  -EKG, labs including CPK, UA, CT-head/neck, SW

## 2019-01-26 NOTE — ED PROVIDER NOTE - NSFOLLOWUPINSTRUCTIONS_ED_ALL_ED_FT
Follow up with your primary care provider within 48 hours  Take Keflex 500mg (1 tablet) twice a day for 7 days  Drink plenty of fluids  Return to the ER with any worsening or concerning symptoms, chest pain, shortness of breath, fever/chills or any other concerns.

## 2019-01-26 NOTE — ED ADULT NURSE NOTE - NSIMPLEMENTINTERV_GEN_ALL_ED
Implemented All Fall with Harm Risk Interventions:  [place] to call system. Call bell, personal items and telephone within reach. Instruct patient to call for assistance. Room bathroom lighting operational. Non-slip footwear when patient is off stretcher. Physically safe environment: no spills, clutter or unnecessary equipment. Stretcher in lowest position, wheels locked, appropriate side rails in place. Provide visual cue, wrist band, yellow gown, etc. Monitor gait and stability. Monitor for mental status changes and reorient to person, place, and time. Review medications for side effects contributing to fall risk. Reinforce activity limits and safety measures with patient and family. Provide visual clues: red socks.

## 2019-01-26 NOTE — ED PROVIDER NOTE - PHYSICAL EXAMINATION
Attending/Bladimir: NAD, Head-atraumatic, PERRL, supple, no cspine PT, RRR, CTAB, no rib PT or ecchymosis, Abd-soft, NT/ND, moving all four ext, no PT, no spinal PT, A&Ox3, nonfocal; Skin-warm/dry/no ecchymosis, no rashes

## 2019-01-26 NOTE — ED ADULT NURSE NOTE - PMH
Anemia    Chronic low back pain    Dementia    DM (diabetes mellitus)    HTN (hypertension)    OA (osteoarthritis)    Obesity    Shingles

## 2019-01-26 NOTE — ED PROVIDER NOTE - OBJECTIVE STATEMENT
89yF w/pmhx dementia, DM, HTN, chronic low back pain BIBA from home for unwitnessed fall. Pts aide at bedside states when she arrived to the house she found the pt on the ground, pt told her she had been down for 10 minutes. Aide called EMS as she was unable to assist the pt to standing. Pt complaining of lower back pain. 89yF w/pmhx dementia, DM, HTN, chronic low back pain BIBA from home for unwitnessed fall. Pts aide at bedside states when she arrived to the house she found the pt on the ground, pt told her she had been down for 10 minutes. Aide called EMS as she was unable to assist the pt to standing. Pt complaining of lower back pain.    Attending/Bladimir: 89 yo F as described above, found by HHA ([id number]) on floor this morning. Pt is a poor historian, no acute complaints. Baseline uses a walker to ambulate.  Pt lives by self however HHA reports someone stays with her at night but unclear of the person/details. The HHA states pt appears to be at baseline.

## 2019-01-26 NOTE — ED ADULT NURSE NOTE - OBJECTIVE STATEMENT
Pt is poor historian, hx of Dementia. Patient was found on floor by HHA at 0900. Patient's HHA reports that patient was on floor for approximately 10 minutes. Patient fell off bed. Patient cannot remember details of incident. Patient does not have any hematoma or deformity on assessment - skin is intact. Patient reports lower back pain, as per HHA, pain is chronic. EMS was called by HHA d/t inability to lift patient off of the floor. Patient's HHA reports that patient does not live with family. Patient or HHA does not know patient's PMH. Patient is acting her normal as per HHA, who has been working with her for over 3 years. Patient is a&ox2 currently, Afghan speaking. Patient's labs drawn, #20g placed into left AC. Patient's family to be contacted by MD. Patient pending results.

## 2019-01-27 LAB — SPECIMEN SOURCE: SIGNIFICANT CHANGE UP

## 2019-01-28 LAB — BACTERIA UR CULT: SIGNIFICANT CHANGE UP

## 2019-05-04 NOTE — ED PROVIDER NOTE - PROGRESS NOTE DETAILS
Dr. Hardik Lee Patient Creole speaking and used  [employer] with PA but patient poor historian. according to HHA at bedside patient found on floor and she stated for 10 minutes, has fallin in thepast, only complaint is lower lumbar back pain. No known blood thinners, unclear if hit head. Exam: HEENT: no scalp contusions or abrasions, neck exam with no bony abnormalities or step offs and normal ROM, MSK: thoracic and lumbar spine without any step offs or tenderness, no hips with normal ROM and no tenderness, patient normally uses walker to ambulate. Will get CT head and neck, CBC, CMP, CK, and T/L spine xray and low suspicion for hip Fx.   -Lul Campos PGY4 [facility]#[id number] TERESA Alatorre: Spoke with son Ayan who states a family member will be able to stay with her at night until they are able to extend coverage overnight. Pt is able to ambulate with walker. discussed with aide how to take antibiotic

## 2019-06-01 ENCOUNTER — OUTPATIENT (OUTPATIENT)
Dept: OUTPATIENT SERVICES | Facility: HOSPITAL | Age: 84
LOS: 1 days | End: 2019-06-01

## 2019-06-01 DIAGNOSIS — D25.9 LEIOMYOMA OF UTERUS, UNSPECIFIED: Chronic | ICD-10-CM

## 2019-06-01 DIAGNOSIS — Z90.710 ACQUIRED ABSENCE OF BOTH CERVIX AND UTERUS: Chronic | ICD-10-CM

## 2019-06-24 ENCOUNTER — INPATIENT (INPATIENT)
Facility: HOSPITAL | Age: 84
LOS: 7 days | Discharge: NURSING, REHAB OR LONG-TERM CARE | End: 2019-07-02
Attending: INTERNAL MEDICINE | Admitting: INTERNAL MEDICINE
Payer: MEDICARE

## 2019-06-24 VITALS
TEMPERATURE: 98 F | DIASTOLIC BLOOD PRESSURE: 72 MMHG | RESPIRATION RATE: 16 BRPM | SYSTOLIC BLOOD PRESSURE: 141 MMHG | OXYGEN SATURATION: 95 % | HEART RATE: 98 BPM

## 2019-06-24 DIAGNOSIS — Z90.710 ACQUIRED ABSENCE OF BOTH CERVIX AND UTERUS: Chronic | ICD-10-CM

## 2019-06-24 DIAGNOSIS — D25.9 LEIOMYOMA OF UTERUS, UNSPECIFIED: Chronic | ICD-10-CM

## 2019-06-24 LAB
ALBUMIN SERPL ELPH-MCNC: 4.6 G/DL — SIGNIFICANT CHANGE UP (ref 3.3–5)
ALP SERPL-CCNC: 69 U/L — SIGNIFICANT CHANGE UP (ref 40–120)
ALT FLD-CCNC: 16 U/L — SIGNIFICANT CHANGE UP (ref 4–33)
ANION GAP SERPL CALC-SCNC: 13 MMO/L — SIGNIFICANT CHANGE UP (ref 7–14)
ANISOCYTOSIS BLD QL: SIGNIFICANT CHANGE UP
APPEARANCE UR: SIGNIFICANT CHANGE UP
AST SERPL-CCNC: 26 U/L — SIGNIFICANT CHANGE UP (ref 4–32)
BACTERIA # UR AUTO: NEGATIVE — SIGNIFICANT CHANGE UP
BASOPHILS # BLD AUTO: 0.1 K/UL — SIGNIFICANT CHANGE UP (ref 0–0.2)
BASOPHILS NFR BLD AUTO: 0.9 % — SIGNIFICANT CHANGE UP (ref 0–2)
BASOPHILS NFR SPEC: 1.7 % — SIGNIFICANT CHANGE UP (ref 0–2)
BILIRUB SERPL-MCNC: 0.4 MG/DL — SIGNIFICANT CHANGE UP (ref 0.2–1.2)
BILIRUB UR-MCNC: NEGATIVE — SIGNIFICANT CHANGE UP
BLASTS # FLD: 0 % — SIGNIFICANT CHANGE UP (ref 0–0)
BLOOD UR QL VISUAL: NEGATIVE — SIGNIFICANT CHANGE UP
BUN SERPL-MCNC: 20 MG/DL — SIGNIFICANT CHANGE UP (ref 7–23)
CALCIUM SERPL-MCNC: 11.2 MG/DL — HIGH (ref 8.4–10.5)
CHLORIDE SERPL-SCNC: 105 MMOL/L — SIGNIFICANT CHANGE UP (ref 98–107)
CO2 SERPL-SCNC: 22 MMOL/L — SIGNIFICANT CHANGE UP (ref 22–31)
COLOR SPEC: SIGNIFICANT CHANGE UP
CREAT SERPL-MCNC: 1.14 MG/DL — SIGNIFICANT CHANGE UP (ref 0.5–1.3)
EOSINOPHIL # BLD AUTO: 0.39 K/UL — SIGNIFICANT CHANGE UP (ref 0–0.5)
EOSINOPHIL NFR BLD AUTO: 3.4 % — SIGNIFICANT CHANGE UP (ref 0–6)
EOSINOPHIL NFR FLD: 1.7 % — SIGNIFICANT CHANGE UP (ref 0–6)
GLUCOSE SERPL-MCNC: 138 MG/DL — HIGH (ref 70–99)
GLUCOSE UR-MCNC: NEGATIVE — SIGNIFICANT CHANGE UP
HCT VFR BLD CALC: 33.8 % — LOW (ref 34.5–45)
HGB BLD-MCNC: 10.7 G/DL — LOW (ref 11.5–15.5)
HYALINE CASTS # UR AUTO: HIGH
IMM GRANULOCYTES NFR BLD AUTO: 0.3 % — SIGNIFICANT CHANGE UP (ref 0–1.5)
KETONES UR-MCNC: NEGATIVE — SIGNIFICANT CHANGE UP
LEUKOCYTE ESTERASE UR-ACNC: SIGNIFICANT CHANGE UP
LYMPHOCYTES # BLD AUTO: 49.9 % — HIGH (ref 13–44)
LYMPHOCYTES # BLD AUTO: 5.66 K/UL — HIGH (ref 1–3.3)
LYMPHOCYTES NFR SPEC AUTO: 39.7 % — SIGNIFICANT CHANGE UP (ref 13–44)
MACROCYTES BLD QL: SIGNIFICANT CHANGE UP
MCHC RBC-ENTMCNC: 31.7 % — LOW (ref 32–36)
MCHC RBC-ENTMCNC: 31.9 PG — SIGNIFICANT CHANGE UP (ref 27–34)
MCV RBC AUTO: 100.9 FL — HIGH (ref 80–100)
METAMYELOCYTES # FLD: 0 % — SIGNIFICANT CHANGE UP (ref 0–1)
MONOCYTES # BLD AUTO: 0.65 K/UL — SIGNIFICANT CHANGE UP (ref 0–0.9)
MONOCYTES NFR BLD AUTO: 5.7 % — SIGNIFICANT CHANGE UP (ref 2–14)
MONOCYTES NFR BLD: 2.6 % — SIGNIFICANT CHANGE UP (ref 2–9)
MYELOCYTES NFR BLD: 0 % — SIGNIFICANT CHANGE UP (ref 0–0)
NEUTROPHIL AB SER-ACNC: 50 % — SIGNIFICANT CHANGE UP (ref 43–77)
NEUTROPHILS # BLD AUTO: 4.51 K/UL — SIGNIFICANT CHANGE UP (ref 1.8–7.4)
NEUTROPHILS NFR BLD AUTO: 39.8 % — LOW (ref 43–77)
NEUTS BAND # BLD: 0 % — SIGNIFICANT CHANGE UP (ref 0–6)
NITRITE UR-MCNC: NEGATIVE — SIGNIFICANT CHANGE UP
NRBC # FLD: 0 K/UL — SIGNIFICANT CHANGE UP (ref 0–0)
OTHER - HEMATOLOGY %: 0 — SIGNIFICANT CHANGE UP
PH UR: 6 — SIGNIFICANT CHANGE UP (ref 5–8)
PLATELET # BLD AUTO: 298 K/UL — SIGNIFICANT CHANGE UP (ref 150–400)
PLATELET COUNT - ESTIMATE: NORMAL — SIGNIFICANT CHANGE UP
PMV BLD: 10.1 FL — SIGNIFICANT CHANGE UP (ref 7–13)
POIKILOCYTOSIS BLD QL AUTO: SLIGHT — SIGNIFICANT CHANGE UP
POLYCHROMASIA BLD QL SMEAR: SLIGHT — SIGNIFICANT CHANGE UP
POTASSIUM SERPL-MCNC: 4.2 MMOL/L — SIGNIFICANT CHANGE UP (ref 3.5–5.3)
POTASSIUM SERPL-SCNC: 4.2 MMOL/L — SIGNIFICANT CHANGE UP (ref 3.5–5.3)
PROMYELOCYTES # FLD: 0 % — SIGNIFICANT CHANGE UP (ref 0–0)
PROT SERPL-MCNC: 8.6 G/DL — HIGH (ref 6–8.3)
PROT UR-MCNC: NEGATIVE — SIGNIFICANT CHANGE UP
RBC # BLD: 3.35 M/UL — LOW (ref 3.8–5.2)
RBC # FLD: 12.6 % — SIGNIFICANT CHANGE UP (ref 10.3–14.5)
RBC CASTS # UR COMP ASSIST: SIGNIFICANT CHANGE UP (ref 0–?)
SODIUM SERPL-SCNC: 140 MMOL/L — SIGNIFICANT CHANGE UP (ref 135–145)
SP GR SPEC: 1.01 — SIGNIFICANT CHANGE UP (ref 1–1.04)
SQUAMOUS # UR AUTO: SIGNIFICANT CHANGE UP
UROBILINOGEN FLD QL: NORMAL — SIGNIFICANT CHANGE UP
VARIANT LYMPHS # BLD: 4.3 % — SIGNIFICANT CHANGE UP
WBC # BLD: 11.34 K/UL — HIGH (ref 3.8–10.5)
WBC # FLD AUTO: 11.34 K/UL — HIGH (ref 3.8–10.5)
WBC UR QL: >50 — HIGH (ref 0–?)

## 2019-06-24 PROCEDURE — 71045 X-RAY EXAM CHEST 1 VIEW: CPT | Mod: 26

## 2019-06-24 RX ORDER — ACETAMINOPHEN 500 MG
650 TABLET ORAL ONCE
Refills: 0 | Status: COMPLETED | OUTPATIENT
Start: 2019-06-24 | End: 2019-06-24

## 2019-06-24 RX ORDER — MECLIZINE HCL 12.5 MG
25 TABLET ORAL ONCE
Refills: 0 | Status: COMPLETED | OUTPATIENT
Start: 2019-06-24 | End: 2019-06-24

## 2019-06-24 RX ADMIN — Medication 25 MILLIGRAM(S): at 21:40

## 2019-06-24 RX ADMIN — Medication 650 MILLIGRAM(S): at 23:20

## 2019-06-24 NOTE — ED ADULT NURSE NOTE - OBJECTIVE STATEMENT
mecca RN- pt received to 21, pt primarily [place] Creole speaking,  ID #[id number]. pt c/o HA and dizziness since last night. pt AAOx2-3, however unable to participate in further interview questions. pt repeatedly states to  "I don't know and I don't understand" when asked details about her symptoms. MD at bedside. 20G IV placed to L arm, labs drawn and sent, VS as noted, pt in NAD, report given to primary RN Clair.

## 2019-06-24 NOTE — ED PROVIDER NOTE - PHYSICAL EXAMINATION
General: NAD, obese, well developed  HENT: Atraumatic, EMOI, PERRLA, no nystagmus, no conjunctivae injection, moist mucosa, no post. oropharynx erythema, exudates  Neck: normal ROM and trachea midline   Cardiovascular: RRR, S1&2, no M or R, radial pulses equal and b/l  Respiratory: CTABL, no wheezes or crackles, no decreased breath sounds  Abdominal: soft and non-tender, non distended, neg for guarding, kim's sign, rovsing's sign, mcburney's sign, no CVA tenderness   Extremities: no edema of the legs/feet, DP/PT equal b/l  Skin: warm, well perfused  Neurologic: nonfocal, AOx2  Psych: normal mood and affect

## 2019-06-24 NOTE — ED PROVIDER NOTE - CLINICAL SUMMARY MEDICAL DECISION MAKING FREE TEXT BOX
89F, h.o dementia, p.w headache and vertigo, associated with movement, no fever, cough. concerning for stroke vs ICH vs central vertigo will get labs for metabolic etiology. and CTH and CTA, and pain control

## 2019-06-24 NOTE — ED PROVIDER NOTE - ATTENDING CONTRIBUTION TO CARE
Patient spoken to with [facility] .  Patient is [place] and with h/o dementia-very poor historian.  endorses feeling dizzy-spinning sensation, for last month, worsening over the last few days, primarily on positional change-improves with staying still. no ha, vision changes, focal weakness, numbness, cp, sob, abd pain, vomiting.   exam  A & O to name and that she is in the hospital, NAD, HEENT WNL and no facial asymmetry; lungs CTAB, heart with reg rhythm without murmur; abdomen soft NTND; extremities with no edema; neuro exam non focal with no motor or sensory deficits. no nystagmus.   Plan-diff dx includes peripheral vertigo though patient is poor historian and given age risk factor, central cause possible v. infectious v. metabolic. Plan for labs, ct head, symptomatic treatment prn-feeling ok now. To be admitted pending w/u.

## 2019-06-24 NOTE — ED PROVIDER NOTE - PROGRESS NOTE DETAILS
Omar Zhu, PGY1: attempt to called emergency contact ([phone])and was told it was the wrong # and let a message at [phone]. Pt has an UTI and will give ctx.

## 2019-06-24 NOTE — ED PROVIDER NOTE - OBJECTIVE STATEMENT
89F, h.o dementia, HTN, Obesity, Anemia, p.w vertigo for 2days associated with movement and laying down. Denied chestpain, shortness of breath, and fever and states lives alone then changed her story to living with a "boy". Pt unable to provide additional info. Pt was 89F, h.o dementia, HTN, Obesity, Anemia, p.w headache and vertigo for 2days associated with movement and laying down. Denied chestpain, shortness of breath, and fever and states lives alone then changed her story to living with a "boy". Pt unable to provide additional info. 89F, h.o dementia, HTN, Obesity, Anemia, p.w headache and vertigo for 2days associated with movement and laying down. Denied chest pain, shortness of breath, and fever and states lives alone then changed her story to living with a "boy". Pt unable to provide additional info.

## 2019-06-24 NOTE — ED ADULT NURSE NOTE - NSIMPLEMENTINTERV_GEN_ALL_ED
Implemented All Fall Risk Interventions:  [place] to call system. Call bell, personal items and telephone within reach. Instruct patient to call for assistance. Room bathroom lighting operational. Non-slip footwear when patient is off stretcher. Physically safe environment: no spills, clutter or unnecessary equipment. Stretcher in lowest position, wheels locked, appropriate side rails in place. Provide visual cue, wrist band, yellow gown, etc. Monitor gait and stability. Monitor for mental status changes and reorient to person, place, and time. Review medications for side effects contributing to fall risk. Reinforce activity limits and safety measures with patient and family.

## 2019-06-25 DIAGNOSIS — F03.90 UNSPECIFIED DEMENTIA WITHOUT BEHAVIORAL DISTURBANCE: ICD-10-CM

## 2019-06-25 DIAGNOSIS — R55 SYNCOPE AND COLLAPSE: ICD-10-CM

## 2019-06-25 DIAGNOSIS — E11.9 TYPE 2 DIABETES MELLITUS WITHOUT COMPLICATIONS: ICD-10-CM

## 2019-06-25 DIAGNOSIS — E83.52 HYPERCALCEMIA: ICD-10-CM

## 2019-06-25 DIAGNOSIS — R42 DIZZINESS AND GIDDINESS: ICD-10-CM

## 2019-06-25 DIAGNOSIS — N39.0 URINARY TRACT INFECTION, SITE NOT SPECIFIED: ICD-10-CM

## 2019-06-25 DIAGNOSIS — D64.9 ANEMIA, UNSPECIFIED: ICD-10-CM

## 2019-06-25 DIAGNOSIS — I10 ESSENTIAL (PRIMARY) HYPERTENSION: ICD-10-CM

## 2019-06-25 LAB
CK SERPL-CCNC: 114 U/L — SIGNIFICANT CHANGE UP (ref 25–170)
GLUCOSE BLDC GLUCOMTR-MCNC: 165 MG/DL — HIGH (ref 70–99)
GLUCOSE BLDC GLUCOMTR-MCNC: 176 MG/DL — HIGH (ref 70–99)
TROPONIN T, HIGH SENSITIVITY: 22 NG/L — SIGNIFICANT CHANGE UP (ref ?–14)

## 2019-06-25 PROCEDURE — 70496 CT ANGIOGRAPHY HEAD: CPT | Mod: 26

## 2019-06-25 PROCEDURE — 70498 CT ANGIOGRAPHY NECK: CPT | Mod: 26

## 2019-06-25 RX ORDER — CEFTRIAXONE 500 MG/1
1000 INJECTION, POWDER, FOR SOLUTION INTRAMUSCULAR; INTRAVENOUS ONCE
Refills: 0 | Status: COMPLETED | OUTPATIENT
Start: 2019-06-25 | End: 2019-06-25

## 2019-06-25 RX ORDER — HEPARIN SODIUM 5000 [USP'U]/ML
5000 INJECTION INTRAVENOUS; SUBCUTANEOUS EVERY 8 HOURS
Refills: 0 | Status: DISCONTINUED | OUTPATIENT
Start: 2019-06-25 | End: 2019-07-02

## 2019-06-25 RX ORDER — MECLIZINE HCL 12.5 MG
25 TABLET ORAL
Refills: 0 | Status: DISCONTINUED | OUTPATIENT
Start: 2019-06-25 | End: 2019-07-02

## 2019-06-25 RX ORDER — ASPIRIN/CALCIUM CARB/MAGNESIUM 324 MG
81 TABLET ORAL DAILY
Refills: 0 | Status: DISCONTINUED | OUTPATIENT
Start: 2019-06-25 | End: 2019-07-02

## 2019-06-25 RX ORDER — DEXTROSE 50 % IN WATER 50 %
25 SYRINGE (ML) INTRAVENOUS ONCE
Refills: 0 | Status: DISCONTINUED | OUTPATIENT
Start: 2019-06-25 | End: 2019-07-02

## 2019-06-25 RX ORDER — MEMANTINE HYDROCHLORIDE 10 MG/1
10 TABLET ORAL DAILY
Refills: 0 | Status: DISCONTINUED | OUTPATIENT
Start: 2019-06-25 | End: 2019-07-02

## 2019-06-25 RX ORDER — NEBIVOLOL HYDROCHLORIDE 5 MG/1
1 TABLET ORAL
Qty: 0 | Refills: 0 | DISCHARGE

## 2019-06-25 RX ORDER — NEBIVOLOL HYDROCHLORIDE 5 MG/1
2.5 TABLET ORAL DAILY
Refills: 0 | Status: DISCONTINUED | OUTPATIENT
Start: 2019-06-25 | End: 2019-07-02

## 2019-06-25 RX ORDER — CEFTRIAXONE 500 MG/1
1 INJECTION, POWDER, FOR SOLUTION INTRAMUSCULAR; INTRAVENOUS EVERY 24 HOURS
Refills: 0 | Status: DISCONTINUED | OUTPATIENT
Start: 2019-06-25 | End: 2019-07-02

## 2019-06-25 RX ORDER — AMLODIPINE BESYLATE 2.5 MG/1
5 TABLET ORAL DAILY
Refills: 0 | Status: DISCONTINUED | OUTPATIENT
Start: 2019-06-25 | End: 2019-07-02

## 2019-06-25 RX ORDER — SODIUM CHLORIDE 9 MG/ML
1000 INJECTION INTRAMUSCULAR; INTRAVENOUS; SUBCUTANEOUS
Refills: 0 | Status: DISCONTINUED | OUTPATIENT
Start: 2019-06-25 | End: 2019-07-02

## 2019-06-25 RX ORDER — SODIUM CHLORIDE 9 MG/ML
1000 INJECTION, SOLUTION INTRAVENOUS
Refills: 0 | Status: DISCONTINUED | OUTPATIENT
Start: 2019-06-25 | End: 2019-07-02

## 2019-06-25 RX ORDER — DEXTROSE 50 % IN WATER 50 %
15 SYRINGE (ML) INTRAVENOUS ONCE
Refills: 0 | Status: DISCONTINUED | OUTPATIENT
Start: 2019-06-25 | End: 2019-07-02

## 2019-06-25 RX ORDER — DEXTROSE 50 % IN WATER 50 %
12.5 SYRINGE (ML) INTRAVENOUS ONCE
Refills: 0 | Status: DISCONTINUED | OUTPATIENT
Start: 2019-06-25 | End: 2019-07-02

## 2019-06-25 RX ORDER — INSULIN LISPRO 100/ML
VIAL (ML) SUBCUTANEOUS
Refills: 0 | Status: DISCONTINUED | OUTPATIENT
Start: 2019-06-25 | End: 2019-07-02

## 2019-06-25 RX ORDER — GLUCAGON INJECTION, SOLUTION 0.5 MG/.1ML
1 INJECTION, SOLUTION SUBCUTANEOUS ONCE
Refills: 0 | Status: DISCONTINUED | OUTPATIENT
Start: 2019-06-25 | End: 2019-07-02

## 2019-06-25 RX ORDER — ATORVASTATIN CALCIUM 80 MG/1
10 TABLET, FILM COATED ORAL AT BEDTIME
Refills: 0 | Status: DISCONTINUED | OUTPATIENT
Start: 2019-06-25 | End: 2019-07-02

## 2019-06-25 RX ADMIN — Medication 81 MILLIGRAM(S): at 21:00

## 2019-06-25 RX ADMIN — Medication 1: at 18:26

## 2019-06-25 RX ADMIN — HEPARIN SODIUM 5000 UNIT(S): 5000 INJECTION INTRAVENOUS; SUBCUTANEOUS at 21:03

## 2019-06-25 RX ADMIN — SODIUM CHLORIDE 75 MILLILITER(S): 9 INJECTION INTRAMUSCULAR; INTRAVENOUS; SUBCUTANEOUS at 22:51

## 2019-06-25 RX ADMIN — MEMANTINE HYDROCHLORIDE 10 MILLIGRAM(S): 10 TABLET ORAL at 20:59

## 2019-06-25 RX ADMIN — CEFTRIAXONE 100 MILLIGRAM(S): 500 INJECTION, POWDER, FOR SOLUTION INTRAMUSCULAR; INTRAVENOUS at 00:58

## 2019-06-25 RX ADMIN — ATORVASTATIN CALCIUM 10 MILLIGRAM(S): 80 TABLET, FILM COATED ORAL at 21:03

## 2019-06-25 RX ADMIN — AMLODIPINE BESYLATE 5 MILLIGRAM(S): 2.5 TABLET ORAL at 18:28

## 2019-06-25 NOTE — CONSULT NOTE ADULT - ASSESSMENT
89 year old Creole speaking female, presents with dizziness x 1 week, nephrology consulted for hypercalcemia    Hypercalcemia  ? etiology  possible dehydration, however urine specific gravity is not high does not support dehydration  check SPEP, SIF, immuno lightchain to r/o multiplemyloma   check vit d 25, vit d 1,25, pth and phos level  avoid calcium supplements  consider NS @ 75cc/hr x 12 hrs  monitor bmp    cKD stage 3  baseline ~ 1-1.1  likely sec to age/HTN/DM  avoid nephrotoxic agent  monitor    HTN  controlled  monitor    dizziness  ? etiology  CT done  consider check orthostatic 89 year old Creole speaking female, presents with dizziness x 1 week, nephrology consulted for hypercalcemia    Hypercalcemia  ? etiology  possible dehydration, however urine specific gravity is not high does not support dehydration  check SPEP, SIF, immuno lightchain to r/o multiplemyloma   check vit d 25, vit d 1,25, pth and phos level  avoid calcium supplements  consider NS @ 75cc/hr x 12 hrs  monitor bmp    cKD stage 3  baseline ~ 1-1.1  likely sec to age/HTN/DM  avoid nephrotoxic agent  monitor renal function     HTN  controlled  monitor BP    dizziness  ? etiology  CT done  consider check orthostatic

## 2019-06-25 NOTE — H&P ADULT - NSHPSOCIALHISTORY_GEN_ALL_CORE
Lives at home with a "gentleman"  Ambulates with walker  Denies drugs, alcohol, smoking  Cannot recall diet

## 2019-06-25 NOTE — H&P ADULT - NEGATIVE GENERAL GENITOURINARY SYMPTOMS
no flank pain L/no urine discoloration/no flank pain R/no incontinence/no hematuria/no dysuria/no renal colic/no urinary hesitancy

## 2019-06-25 NOTE — PROVIDER CONTACT NOTE (OTHER) - BACKGROUND
89 year old Creole speaking female, presents with dizziness x 1 week. Pt severely hard of hearing with dementia and  phone needed.

## 2019-06-25 NOTE — PATIENT PROFILE ADULT - NSPROGENANESREACTION_GEN_A_NUR
unable to obtain information. pt confused @ times. unable to answer all questions. no family @ bedside to assist

## 2019-06-25 NOTE — H&P ADULT - ASSESSMENT
89 year old F, AAOx2 presents today with dizziness x 1 week.                           10.7   11.34 )-----------( 298      ( 2019 21:46 )             33.8       140  |  105  |  20  ----------------------------<  138<H>  4.2   |  22  |  1.14    Ca    11.2<H>      2019 21:46    TPro  8.6<H>  /  Alb  4.6  /  TBili  0.4  /  DBili  x   /  AST  26  /  ALT  16  /  AlkPhos  69      EKst degree AV block, flattened T Waves in AVL   CXR: clear lungs     NONCONTRAST HEAD CT SCAN: No CT evidence of acute intracranial   hemorrhage, mass effect or acute territorial infarct.    CT ANGIOGRAPHY NECK: Patent cervical vasculature.  No hemodynamically   significant carotid stenosis or flow-limiting vertebral artery stenosis.    No evidence of vascular dissection in the neck.    CT ANGIOGRAPHY BRAIN:  1.  Atherosclerotic calcification causes short segment moderate stenosis   in the proximal supraclinoid segment of the right internal carotid artery.  2.  Tapered narrowing of the distal right internal carotid artery to a   minimum caliber of 1 mm at the carotid terminus.  Short segment severe   stenosis versus focal occlusion at the origin of the right middle   cerebral artery.  M1 and M2 segments of the right middle cerebral artery   are patent but diminished in caliber and demonstrate decreased flow   related enhancement compared to the contralateral side.  There are   numerous collateral vessels in the region of the right basal ganglia with   a "puff of smoke " appearance, commonly seen in Moyamoya syndrome. 89 year old F presents today with dizziness x 1 week.                           10.7   11.34 )-----------( 298      ( 24 Jun 2019 21:46 )             33.8   06-24    140  |  105  |  20  ----------------------------<  138<H>  4.2   |  22  |  1.14    Ca    11.2<H>      24 Jun 2019 21:46    TPro  8.6<H>  /  Alb  4.6  /  TBili  0.4  /  DBili  x   /  AST  26  /  ALT  16  /  AlkPhos  69  06-24    EKG: NSR @ 95 1st degree AV block, Flattened T waves in I, AVL   CXR: clear lungs     NONCONTRAST HEAD CT SCAN: No CT evidence of acute intracranial  hemorrhage, mass effect or acute territorial infarct.  CT ANGIOGRAPHY NECK: Patent cervical vasculature.  No hemodynamically  significant carotid stenosis or flow-limiting vertebral artery stenosis.    No evidence of vascular dissection in the neck.  CT ANGIOGRAPHY BRAIN:  1.  Atherosclerotic calcification causes short segment moderate stenosis in the proximal supraclinoid segment of the right internal carotid artery.  2.  Tapered narrowing of the distal right internal carotid artery to a minimum caliber of 1 mm at the carotid terminus.  Short segment severe   stenosis versus focal occlusion at the origin of the right middle cerebral artery.  M1 and M2 segments of the right middle cerebral artery   are patent but diminished in caliber and demonstrate decreased flow related enhancement compared to the contralateral side.  There are   numerous collateral vessels in the region of the right basal ganglia with a "puff of smoke " appearance, commonly seen in Moyamoya syndrome.

## 2019-06-25 NOTE — H&P ADULT - PROBLEM SELECTOR PLAN 5
Obtain medication list from pharmacy. Continue diet and exercise. PT eval Most recent /76. Verify home meds, will continue bystolic, amlodipine

## 2019-06-25 NOTE — H&P ADULT - PROBLEM SELECTOR PLAN 1
Likely BPPV vs secondary to arrhythmia vs orthostatic hypotension. Admit to tele, monitor with serial EKGs. Admit to Telemetry, serial CE's, CTA H&N done, check Echo & Orthostatics. Consider Neuro c/s. F/U MD note Admit to Telemetry, serial CE's, CTA H&N done, check Echo & Orthostatics. Neuro c/s called. F/U MD note

## 2019-06-25 NOTE — CONSULT NOTE ADULT - ASSESSMENT
89 year old Creole speaking female, presents with dizziness x 1 week. Patient is a poor historian, AAOx2 and hard of hearing.   Neurology consulted for dizziness. CTA findings shows severe intracranial atherosclerosis in multiple different vascular territories with collateral supply suggestive of Mccain Mccain. Patient currently asymptomatic.     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     Plan:  - MRI Brain  - May need conventional angiography to confirm diagnosis  - Will discuss with attending.

## 2019-06-25 NOTE — CONSULT NOTE ADULT - ASSESSMENT
Dizziness  unclear if purely vertigo  monitor on tele  echo   neurology eval     HTN  stable  cont current meds    DM  Monitor finger stick. Insulin coverage. Diabetic education and Diabetic diet. Consider nutrition consultation.

## 2019-06-25 NOTE — H&P ADULT - GASTROINTESTINAL DETAILS
soft/nontender/normal/no masses palpable/no distention/no guarding/no rebound tenderness/bowel sounds normal

## 2019-06-25 NOTE — H&P ADULT - HISTORY OF PRESENT ILLNESS
89 year old F, AAOx2 presents today with dizziness x 1 week. Patient is a poor historian and hard of hearing. Dizziness is intermittent, lasts 2hrs, with unknown frequency, described as head spinning, which worsens when sitting down and getting up, improves with water, same severity since onset. Denies recent LOC. Admits to loss of balance and fall 1 month ago due dizziness w/ no head trauma, and did not seek medical care at that time. Patient currently still dizzy. Admits to intermittent HA, "feverish" at night which resolves within minutes, hearing loss, urinary frequency, paroxysmal nocturnal dyspnea- "choking at night", uses 3 pillows to sleep, intermittent HA, lower back pain when sitting. Denies fatigue, chills, weight loss, chest pain, palpitations, murmurs, LE swelling, ascites, seizure, photosensitivity, loss of sensation, syncope, dysuria, hematuria, urgency, incontinence, pelvic pain, flank pain, nausea, vomiting, diarrhea, constipation, BRPR, melena. 89 year old Creole speaking female, presents with dizziness x 1 week. Patient is a poor historian, AAOx2 and hard of hearing. Dizziness is intermittent, lasts 2hrs, with unknown frequency, described as head spinning, which worsens when sitting down and getting up, improves with water, same severity since onset. Denies recent LOC. Admits to loss of balance and fall 1 month ago due dizziness w/ no head trauma, and did not seek medical care at that time. Patient currently still dizzy. Admits to intermittent HA, "feverish" at night which resolves within minutes, hearing loss, urinary frequency, paroxysmal nocturnal dyspnea- "choking at night", uses 3 pillows to sleep, intermittent HA, lower back pain when sitting. Denies fatigue, chills, weight loss, chest pain, palpitations, murmurs, LE swelling, ascites, seizure, photosensitivity, loss of sensation, syncope, dysuria, hematuria, urgency, incontinence, pelvic pain, flank pain, nausea, vomiting, diarrhea, constipation, BRBPR, melena.

## 2019-06-25 NOTE — H&P ADULT - PROBLEM SELECTOR PLAN 3
Refer to Neurology. Monitor serial BMP's, consider Renal eval Monitor serial BMP's, IVF, ICa, SPEP/UPEP, PTH F/U Urine C/S. Continue ceftriaxone

## 2019-06-25 NOTE — CONSULT NOTE ADULT - ASSESSMENT
attmpted creole translation - but memory limited also--The patient is a 89y Female with a history of HT,DM, admitted w c/o dizziness and UTI- dizziness only upright, dilpopia-?Yrs?, w hearing loss, no c/o of lateralized sx  d/w tele PA to arrange  MRI  stroke consult called for multiple regions of severe intacranial stenosis also ?Kalyn Mccain-- doubt symptomatic but poor historian  concern for dementia if mental state does not improve as outpt and rec f/u

## 2019-06-25 NOTE — CONSULT NOTE ADULT - SUBJECTIVE AND OBJECTIVE BOX
Neurology Consult Note    Patient is a 89y old  Female who presents with a chief complaint of Dizziness x 1 week (25 Jun 2019 17:05)      attmpted creole translation - but memory limited also--The patient is a 89y Female with a history of HT,DM, admitted w c/o dizziness and UTI- dizziness only upright, dilpopia-?Yrs?, w hearing loss, no c/o of lateralized sx    MEDICATIONS  (STANDING):  amLODIPine   Tablet 5 milliGRAM(s) Oral daily  aspirin enteric coated 81 milliGRAM(s) Oral daily  atorvastatin 10 milliGRAM(s) Oral at bedtime  cefTRIAXone   IVPB 1 milliGRAM(s) IV Intermittent every 24 hours  dextrose 5%. 1000 milliLiter(s) (50 mL/Hr) IV Continuous <Continuous>  dextrose 50% Injectable 12.5 Gram(s) IV Push once  dextrose 50% Injectable 25 Gram(s) IV Push once  dextrose 50% Injectable 25 Gram(s) IV Push once  heparin  Injectable 5000 Unit(s) SubCutaneous every 8 hours  insulin lispro (HumaLOG) corrective regimen sliding scale   SubCutaneous three times a day before meals  memantine 10 milliGRAM(s) Oral daily  nebivolol 2.5 milliGRAM(s) Oral daily  sodium chloride 0.9%. 1000 milliLiter(s) (75 mL/Hr) IV Continuous <Continuous>    MEDICATIONS  (PRN):  dextrose 40% Gel 15 Gram(s) Oral once PRN Blood Glucose LESS THAN 70 milliGRAM(s)/deciliter  glucagon  Injectable 1 milliGRAM(s) IntraMuscular once PRN Glucose LESS THAN 70 milligrams/deciliter  meclizine 25 milliGRAM(s) Oral two times a day PRN Dizziness      PAST MEDICAL & SURGICAL HISTORY:  Obesity  Anemia  Dementia  OA (osteoarthritis)  Chronic low back pain  Shingles  DM (diabetes mellitus)  HTN (hypertension)  S/P hysterectomy with oophorectomy  Uterine fibroid      FAMILY HISTORY:  No pertinent family history in first degree relatives      Allergies    No Known Allergies    Intolerances        SOCIAL HISTORY:  type ~  then ?H&P    REVIEW OF SYSTEMS  General:(-),Skin/Breast:(-),Ophthalmologic:(-),ENMT:(-),Respiratory and Thorax:(-),Cardiovascular:(-),Gastrointestinal:(-),Genitourinary:(-),Musculoskeletal:(-),Psychiatric:(-),Hematology/Lymphatics:(-),Endocrine:(-),Allergic/Immunologic:(-)    Vital Signs Last 24 Hrs  T(C): 36.7 (25 Jun 2019 17:27), Max: 36.9 (25 Jun 2019 09:05)  T(F): 98 (25 Jun 2019 17:27), Max: 98.5 (25 Jun 2019 13:11)  HR: 104 (25 Jun 2019 17:27) (77 - 104)  BP: 189/95 (25 Jun 2019 17:27) (132/76 - 189/95)  BP(mean): --  RR: 20 (25 Jun 2019 17:27) (15 - 20)  SpO2: 96% (25 Jun 2019 13:11) (96% - 97%)    Physical exam  GENERAL-WDWN  CARDIOVASCULAR- Cor- RRR s MGR, carotid- 2+, No bruits  EYES- non-icteric disks obscured  MENTAL STATUS- Alert, attends, fluent, non-dysarthric, follows commands, orientation- not year ; pres?.  CRANIAL NERVES-II Optic - Bilateral -  Visual Fields- inconsistent. III-IV-VI EOMI & Pupils - Bilateral - Normal Bilaterally. V Trigeminal - Bilateral - Normal bilateral facial sensation and jaw movement. VII Facial - Normal bilateral facial movement. VIII Acoustic - Bilateral - Hearing decto voice bilaterally. IX Glossopharyngeal / X Vagus - Normal palate elevates symmetrically. XI Accessory - Normal Bilaterally. XII Hypoglossal - Tongue protrudes midline and moves symmetrically.  MOTOR-Bulk and Contour - Normal. Tone - Normal tone, no abnormal movements. Strength - 5/5 normal muscle strength - Strength full throughout.  SENSORY-Normal - LT, DSS,    REFLEXES- DTR's 0-1+ throughout.  COORDINATION-Normal FFM, STEPHEN, FNF - bilaterally.  GAIT-NA    CBC Full  -  ( 24 Jun 2019 21:46 )  WBC Count : 11.34 K/uL  RBC Count : 3.35 M/uL  Hemoglobin : 10.7 g/dL  Hematocrit : 33.8 %  Platelet Count - Automated : 298 K/uL  Mean Cell Volume : 100.9 fL  Mean Cell Hemoglobin : 31.9 pg  Mean Cell Hemoglobin Concentration : 31.7 %  Auto Neutrophil # : 4.51 K/uL  Auto Lymphocyte # : 5.66 K/uL  Auto Monocyte # : 0.65 K/uL  Auto Eosinophil # : 0.39 K/uL  Auto Basophil # : 0.10 K/uL  Auto Neutrophil % : 39.8 %  Auto Lymphocyte % : 49.9 %  Auto Monocyte % : 5.7 %  Auto Eosinophil % : 3.4 %  Auto Basophil % : 0.9 %      06-24    140  |  105  |  20  ----------------------------<  138<H>  4.2   |  22  |  1.14    Ca    11.2<H>      24 Jun 2019 21:46    TPro  8.6<H>  /  Alb  4.6  /  TBili  0.4  /  DBili  x   /  AST  26  /  ALT  16  /  AlkPhos  69  06-24    LIVER FUNCTIONS - ( 24 Jun 2019 21:46 )  Alb: 4.6 g/dL / Pro: 8.6 g/dL / ALK PHOS: 69 u/L / ALT: 16 u/L / AST: 26 u/L / GGT: x             < from: CT Angio Head w/ IV Cont (06.25.19 @ 04:12) >  IMPRESSION:  NONCONTRAST HEAD CT SCAN: No CT evidence of acute intracranial   hemorrhage, mass effect or acute territorial infarct.    CT ANGIOGRAPHY NECK: Patent cervical vasculature.  No hemodynamically   significant carotid stenosis or flow-limiting vertebral artery stenosis.    No evidence of vascular dissection in the neck.    CT ANGIOGRAPHY BRAIN:  1.  Atherosclerotic calcification causes short segment moderate stenosis   in the proximal supraclinoid segment of the right internal carotid artery.  2.  Tapered narrowing of the distal right internal carotid artery to a   minimum caliber of 1 mm at the carotid terminus.  Short segment severe   stenosis versus focal occlusion at the origin of the right middle     < end of copied text >
CHIEF COMPLAINT:Patient is a 89y old  Female who presents with a chief complaint of Dizziness x 1 week (25 Jun 2019 11:59)      HISTORY OF PRESENT ILLNESS:    Due to altered mental status, subjective information were not able to be obtained from the patient. History was obtained, to the extent possible, from review of the chart and collateral sources of information.     89 year old Creole speaking female, presents with dizziness x 1 week. Patient is a poor historian, AAOx2 and hard of hearing. Dizziness is intermittent, lasts 2hrs, with unknown frequency, described as head spinning, which worsens when sitting down and getting up, improves with water, same severity since onset. Denies recent LOC. Admits to loss of balance and fall 1 month ago due dizziness w/ no head trauma, and did not seek medical care at that time. Patient currently still dizzy. Admits to intermittent HA, "feverish" at night which resolves within minutes, hearing loss, urinary frequency, paroxysmal nocturnal dyspnea- "choking at night", uses 3 pillows to sleep, intermittent HA, lower back pain when sitting. Denies fatigue, chills, weight loss, chest pain, palpitations, murmurs, LE swelling, ascites, seizure, photosensitivity, loss of sensation, syncope, dysuria, hematuria, urgency, incontinence, pelvic pain, flank pain, nausea, vomiting, diarrhea, constipation, BRBPR, melena.       PAST MEDICAL & SURGICAL HISTORY:  Obesity  Anemia  Dementia  OA (osteoarthritis)  Chronic low back pain  Shingles  DM (diabetes mellitus)  HTN (hypertension)  S/P hysterectomy with oophorectomy  Uterine fibroid          MEDICATIONS:  amLODIPine   Tablet 5 milliGRAM(s) Oral daily  aspirin enteric coated 81 milliGRAM(s) Oral daily  heparin  Injectable 5000 Unit(s) SubCutaneous every 8 hours  nebivolol 2.5 milliGRAM(s) Oral daily    cefTRIAXone   IVPB 1 milliGRAM(s) IV Intermittent every 24 hours      meclizine 25 milliGRAM(s) Oral two times a day PRN      atorvastatin 10 milliGRAM(s) Oral at bedtime  dextrose 40% Gel 15 Gram(s) Oral once PRN  dextrose 50% Injectable 12.5 Gram(s) IV Push once  dextrose 50% Injectable 25 Gram(s) IV Push once  dextrose 50% Injectable 25 Gram(s) IV Push once  glucagon  Injectable 1 milliGRAM(s) IntraMuscular once PRN  insulin lispro (HumaLOG) corrective regimen sliding scale   SubCutaneous three times a day before meals    dextrose 5%. 1000 milliLiter(s) IV Continuous <Continuous>      FAMILY HISTORY:  No pertinent family history in first degree relatives      Non-contributory    SOCIAL HISTORY:    No tobacco, drugs or etoh    Allergies    No Known Allergies    Intolerances    	    REVIEW OF SYSTEMS:  as above  The rest of the 14 points ROS reviewed and except above they are unremarkable.        PHYSICAL EXAM:  T(C): 36.9 (06-25-19 @ 13:11), Max: 36.9 (06-25-19 @ 09:05)  HR: 77 (06-25-19 @ 13:11) (77 - 98)  BP: 138/76 (06-25-19 @ 13:11) (132/76 - 160/86)  RR: 16 (06-25-19 @ 13:11) (15 - 16)  SpO2: 96% (06-25-19 @ 13:11) (95% - 97%)  Wt(kg): --  I&O's Summary      JVP: Normal  Neck: supple  Lung: clear   CV: S1 S2 , Murmur:  Abd: soft  Ext: No edema  neuro: Awake / alert  Psych: flat affect  Skin: normal      LABS/DATA:    TELEMETRY: 	    ECG:  	   	  CARDIAC MARKERS:                                      10.7   11.34 )-----------( 298      ( 24 Jun 2019 21:46 )             33.8     06-24    140  |  105  |  20  ----------------------------<  138<H>  4.2   |  22  |  1.14    Ca    11.2<H>      24 Jun 2019 21:46    TPro  8.6<H>  /  Alb  4.6  /  TBili  0.4  /  DBili  x   /  AST  26  /  ALT  16  /  AlkPhos  69  06-24    proBNP:   Lipid Profile:   HgA1c:   TSH:
[facility]   MD SIS JACOBS MD ANGELA WONG, PA    TEL:  OFFICE: [phone]  DR SZYMANSKI CELL: [phone]  DR. WOLFE CELL: [phone]  JESSICA GALINDO CELL: [phone]      HPI:  creole  ID #[id number]  89 year old Creole speaking female, presents with dizziness x 1 week. Patient is a poor historian, AAOx1/2 and hard of hearing. Denies recent LOC. Admits to loss of balance and fall 1 month ago due dizziness w/ no head trauma, and did not seek medical care at that time. Patient currently still dizzy. currently denied headache, n/v, fever, chill, chest pain, sob, lost of appetite, syncope.  patient also c/o back pain for a long time worsen with sitting. stating she have back issue since teenager.     Allergies:  No Known Allergies      PAST MEDICAL & SURGICAL HISTORY:  Obesity  Anemia  Dementia  OA (osteoarthritis)  Chronic low back pain  Shingles  DM (diabetes mellitus)  HTN (hypertension)  S/P hysterectomy with oophorectomy  Uterine fibroid      Home Medications Reviewed    Hospital Medications:   MEDICATIONS  (STANDING):  amLODIPine   Tablet 5 milliGRAM(s) Oral daily  aspirin enteric coated 81 milliGRAM(s) Oral daily  atorvastatin 10 milliGRAM(s) Oral at bedtime  cefTRIAXone   IVPB 1 milliGRAM(s) IV Intermittent every 24 hours  dextrose 5%. 1000 milliLiter(s) (50 mL/Hr) IV Continuous <Continuous>  dextrose 50% Injectable 12.5 Gram(s) IV Push once  dextrose 50% Injectable 25 Gram(s) IV Push once  dextrose 50% Injectable 25 Gram(s) IV Push once  heparin  Injectable 5000 Unit(s) SubCutaneous every 8 hours  insulin lispro (HumaLOG) corrective regimen sliding scale   SubCutaneous three times a day before meals  memantine 10 milliGRAM(s) Oral daily  nebivolol 2.5 milliGRAM(s) Oral daily      SOCIAL HISTORY:  Denies ETOh, Smoking,     FAMILY HISTORY:  No pertinent family history in first degree relatives      REVIEW OF SYSTEMS:  CONSTITUTIONAL: No weakness, fevers or chills  EYES/ENT: No visual changes;  + vertigo no throat pain   NECK: No pain or stiffness  RESPIRATORY: No cough, wheezing, hemoptysis; No shortness of breath  CARDIOVASCULAR: No chest pain or palpitations.  GASTROINTESTINAL: No abdominal or epigastric pain. No nausea, vomiting, or hematemesis; No diarrhea or constipation. No melena or hematochezia.  GENITOURINARY: No dysuria, frequency, foamy urine, urinary urgency, incontinence or hematuria  NEUROLOGICAL: dizziness  SKIN: No itching, burning, rashes, or lesions   VASCULAR: No bilateral lower extremity edema.   All other review of systems is negative unless indicated above.    VITALS:  T(F): 98.5 (19 @ 13:11), Max: 98.5 (19 @ 13:11)  HR: 77 (19 @ 13:11)  BP: 138/76 (19 @ 13:11)  RR: 16 (19 @ :11)  SpO2: 96% (19 @ :11)  Wt(kg): --        PHYSICAL EXAM:  Constitutional: NAD  HEENT: anicteric sclera, oropharynx clear, MMM  Neck: No JVD  Respiratory: CTAB, no wheezes, rales or rhonchi  Cardiovascular: S1, S2, RRR  Gastrointestinal: BS+, soft, NT/ND  Extremities: No cyanosis or clubbing. No peripheral edema  Neurological: A/O x 1/2  Psychiatric: Normal mood, normal affect  : No CVA tenderness. No murcia.   Skin: No rashes    LABS:      140  |  105  |  20  ----------------------------<  138<H>  4.2   |  22  |  1.14    Ca    11.2<H>      2019 21:46    TPro  8.6<H>  /  Alb  4.6  /  TBili  0.4  /  DBili      /  AST  26  /  ALT  16  /  AlkPhos  69      Creatinine Trend: 1.14 <--                        10.7   11.34 )-----------( 298      ( 2019 21:46 )             33.8     Urine Studies:  Urinalysis Basic - ( 2019 22:42 )    Color: LIGHT YELLOW / Appearance: Lt TURBID / S.008 / pH: 6.0  Gluc: NEGATIVE / Ketone: NEGATIVE  / Bili: NEGATIVE / Urobili: NORMAL   Blood: NEGATIVE / Protein: NEGATIVE / Nitrite: NEGATIVE   Leuk Esterase: LARGE / RBC: 0-2 / WBC >50   Sq Epi: FEW / Non Sq Epi:  / Bacteria: NEGATIVE          RADIOLOGY & ADDITIONAL STUDIES:
Neurology  Consult Note  06-25-19    Name:  BRIDGER ALLAN; 89y (25-Dec-1926)    Reason for Admission: Dizziness and giddiness      Chief Complaint:  HPI:  89 year old Creole speaking female, presents with dizziness x 1 week. Patient is a poor historian, AAOx2 and hard of hearing. Dizziness is intermittent, lasts 2hrs, with unknown frequency, described as head spinning, which worsens when sitting down and getting up, improves with water, same severity since onset. Denies recent LOC. Admits to loss of balance and fall 1 month ago due dizziness w/ no head trauma, and did not seek medical care at that time. Patient currently still dizzy. Admits to intermittent HA, "feverish" at night which resolves within minutes, hearing loss, urinary frequency, paroxysmal nocturnal dyspnea- "choking at night", uses 3 pillows to sleep, intermittent HA, lower back pain when sitting. Denies fatigue, chills, weight loss, chest pain, palpitations, murmurs, LE swelling, ascites, seizure, photosensitivity, loss of sensation, syncope, dysuria, hematuria, urgency, incontinence, pelvic pain, flank pain, nausea, vomiting, diarrhea, constipation, BRBPR, melena.     Neurology consulted for dizziness. CTA findings shows severe intracranial atherosclerosis in multiple different vascular territories with collateral supply suggestive of Mccain Mccain. Patient currently asymptomatic.     Review of Systems:  As states in HPI.    PMHx:   Obesity  Anemia  Dementia  OA (osteoarthritis)  Chronic low back pain  DM (diabetes mellitus)  HTN (hypertension)  Shingles  DM (diabetes mellitus)  HTN (hypertension)    PFHx:   No pertinent family history in first degree relatives    PSuHx:   S/P hysterectomy with oophorectomy  Uterine fibroid  No significant past surgical history    Medications:  MEDICATIONS  (STANDING):  amLODIPine   Tablet 5 milliGRAM(s) Oral daily  aspirin enteric coated 81 milliGRAM(s) Oral daily  atorvastatin 10 milliGRAM(s) Oral at bedtime  cefTRIAXone   IVPB 1 milliGRAM(s) IV Intermittent every 24 hours  dextrose 5%. 1000 milliLiter(s) (50 mL/Hr) IV Continuous <Continuous>  dextrose 50% Injectable 12.5 Gram(s) IV Push once  dextrose 50% Injectable 25 Gram(s) IV Push once  dextrose 50% Injectable 25 Gram(s) IV Push once  heparin  Injectable 5000 Unit(s) SubCutaneous every 8 hours  insulin lispro (HumaLOG) corrective regimen sliding scale   SubCutaneous three times a day before meals  memantine 10 milliGRAM(s) Oral daily  nebivolol 2.5 milliGRAM(s) Oral daily  sodium chloride 0.9%. 1000 milliLiter(s) (75 mL/Hr) IV Continuous <Continuous>    MEDICATIONS  (PRN):  dextrose 40% Gel 15 Gram(s) Oral once PRN Blood Glucose LESS THAN 70 milliGRAM(s)/deciliter  glucagon  Injectable 1 milliGRAM(s) IntraMuscular once PRN Glucose LESS THAN 70 milligrams/deciliter  meclizine 25 milliGRAM(s) Oral two times a day PRN Dizziness    Vitals:  T(C): 36.7 (06-25-19 @ 19:45), Max: 36.9 (06-25-19 @ 09:05)  HR: 108 (06-25-19 @ 19:45) (77 - 108)  BP: 167/90 (06-25-19 @ 19:45) (132/76 - 189/95)  RR: 20 (06-25-19 @ 19:45) (15 - 20)  SpO2: 98% (06-25-19 @ 19:45) (96% - 98%)    Labs:                        10.7   11.34 )-----------( 298      ( 24 Jun 2019 21:46 )             33.8     06-24    140  |  105  |  20  ----------------------------<  138<H>  4.2   |  22  |  1.14    Ca    11.2<H>      24 Jun 2019 21:46    TPro  8.6<H>  /  Alb  4.6  /  TBili  0.4  /  DBili  x   /  AST  26  /  ALT  16  /  AlkPhos  69  06-24    CAPILLARY BLOOD GLUCOSE      POCT Blood Glucose.: 176 mg/dL (25 Jun 2019 21:47)    LIVER FUNCTIONS - ( 24 Jun 2019 21:46 )  Alb: 4.6 g/dL / Pro: 8.6 g/dL / ALK PHOS: 69 u/L / ALT: 16 u/L / AST: 26 u/L / GGT: x             PHYSICAL EXAMINATION:  General: Well-developed, well nourished, in no acute distress.  Eyes: Conjunctiva and sclera clear.  Neurologic:  - Mental Status:  Alert, awake, oriented to person, place, only. Speech is fluent with intact naming, repetition. follows simple commands.  - Cranial Nerves II-XII:    II:  Visual fields are full to confrontation; Pupils are equal, round, and reactive to light;   III, IV, VI:  Extraocular movements are intact without nystagmus.  V:  Facial sensation is intact in the V1-V3 distribution bilaterally.  VII:  Face is symmetric with normal eye closure and smile  XI:  Head turning and shoulder shrug are intact.  XII:  Tongue protudes in the midline.  - Motor:  Strength is 5/5 throughout.  There is no pronator drift.    - Sensory:  Intact throughout to vibration, and joint-position, light touch, pin prick.  - Coordination:  Finger-nose-finger and heel-knee-shin intact without dysmetria.  Rapid alternating hand movements intact.    Radiology:  < from: CT Head No Cont (01.26.19 @ 12:05) >  IMPRESSION:  No intracranial hemorrhage.  No cervical spine fracture.    BRIANNA SHANKS M.D., ATTENDING RADIOLOGIST  This document has been electronically signed. Jan 26 2019 12:30PM  < end of copied text >    < from: CT Angio Head w/ IV Cont (06.25.19 @ 04:12) >  IMPRESSION:  NONCONTRAST HEAD CT SCAN: No CT evidence of acute intracranial   hemorrhage, mass effect or acute territorial infarct.    CT ANGIOGRAPHY NECK: Patent cervical vasculature.  No hemodynamically   significant carotid stenosis or flow-limiting vertebral artery stenosis.    No evidence of vascular dissection in the neck.    CT ANGIOGRAPHY BRAIN:  1.  Atherosclerotic calcification causes short segment moderate stenosis   in the proximal supraclinoid segment of the right internal carotid artery.  2.  Tapered narrowing of the distal right internal carotid artery to a   minimum caliber of 1 mm at the carotid terminus.  Short segment severe   stenosis versus focal occlusion at the origin of the right middle   cerebral artery.  M1 and M2 segments of the right middle cerebral artery   are patent but diminished in caliber and demonstrate decreased flow   related enhancement compared to the contralateral side.  There are   numerous collateral vessels in the region of the right basal ganglia with   a "puff of smoke " appearance, commonly seen in Moyamoya syndrome.      POSTCONTRAST HEAD CT SCAN:   1. No evidence of acute territorial infarct or abnormal enhancement.    2. The dural venous sinuses and cavernous sinuses are patent.   3. Follow-up MRI may be performed as clinically warranted to exclude an   acute infarct.      JC BUCHANAN M.D.,ATTENDING RADIOLOGIST  This document has been electronically signed. Jun 25 2019  4:45AM  < end of copied text >

## 2019-06-25 NOTE — ED ADULT NURSE REASSESSMENT NOTE - NS ED NURSE REASSESS COMMENT FT1
Pt. is alert and oriented x 2 no c/o pain no c/o dizziness. Cardiac monitor in place will continue to monitor.

## 2019-06-25 NOTE — H&P ADULT - PROBLEM SELECTOR PLAN 6
Obtain stool guaiacs and serial CBC. Check Fingersticks with Meals and cover with ISS TID, check HgbA1C PT eval

## 2019-06-25 NOTE — PROVIDER CONTACT NOTE (OTHER) - SITUATION
Medication history complete, OMR updated based on information from pharmacy.  Providing additional contact information.

## 2019-06-25 NOTE — H&P ADULT - RS GEN PE MLT RESP DETAILS PC
clear to auscultation bilaterally/respirations non-labored good air movement/clear to auscultation bilaterally/respirations non-labored

## 2019-06-25 NOTE — H&P ADULT - PROBLEM SELECTOR PLAN 4
Most recent /76. Obtain medication list from pharmacy. Continue DASH diet and monitoring. Most recent /76. Verify home meds Most recent /76. Verify home meds, will continue bystolic, amlodipine Monitor serial BMP's, IVF, ICa, SPEP/UPEP, PTH

## 2019-06-25 NOTE — CONSULT NOTE ADULT - ATTENDING COMMENTS
I have seen and examined this patient with the stroke neurology team.     History was reviewed with the patient but was limited due to lack of cooperation due to cognitive dysfunction.  ROS: All negative except documented in the HPI.   Neurological exam was performed and agree with exam as documented above.   Laboratory results and imaging studies were reviewed by me.   I agree with the neurology resident note as documented above.    89 years old woman with multiple vascular risk factors is evaluated at [facility] for dizziness and described as vertiginous sensations of at least 2-3 days duration. CT brain on admission did not show any evidence of an acute infarct or hemorrhage. CTA head and neck showed intracranial atherosclerotic stenosis involving right ICA terminus (supraclinoid ICA, right GARCIA proximal A1 segment and right MCA proximal M1 segment) as well as critical stenosis versus occlusion of right MCA mid-M1 segment with dilated lenticular striate branches. Neurological examination today is limited due to lack of cooperation but was concerning for subtle left hemiparesis.    Impression:  Cerebral thrombosis with cerebral infarction. Probable right MCA distribution stroke - likely mechanism being large vessel disease i.e. symptomatic intracranial atherosclerosis but possibility of small vessel disease or an embolic stroke from undetermined source needs to be further evaluated    Plan:  - Recommend aspirin and clopidogrel for aggressive secondary stroke prevention for 3 months considering symptomatic intracranial large vessel severe atherosclerotic stenosis followed by aspirin 81 mg once a day   - Agree to continue with pharmacological DVT prophylaxis   - Atorvastatin 80 mg at bedtime once able to pass the swallow evaluation or enteral access is established; titrate the dose according to LDL  - HbA1C and LDL, continue with aggressive vascular risk factors modifications   - Permissive HTN up to 220/110 mmHg for first 48-72 hours from symptom onset followed by gradual normotension  - MRI brain without contrast   - TTE with bubble study and continue with telemetry to screen for possible cardiac source of embolism  - Prolonged cardiac monitoring with 30 days event monitor vs. ICM to screen for occult cardiac arrhythmia like atrial fibrillation being the cause of possible cardiac source of embolism to be considered based on results of above mentioned cardiac tests    - PT/OT/Speech and swallow/safety eval  - Stroke education  - Evaluation and management of orthostatic hypotension as per primary team     Above mentioned plan was discussed with patient in detail through  phone. All the questions were answered and concerns were addressed.

## 2019-06-25 NOTE — H&P ADULT - PROBLEM SELECTOR PLAN 2
Symptomatic. Obtain UA and Urine C/S. Start abx therapy pending results. F/U Urine C/S. Continue ceftriaxone Meclizine prn, Neurology c/s called

## 2019-06-25 NOTE — PROVIDER CONTACT NOTE (OTHER) - ASSESSMENT
[facility]  in [facility] name Tamika ID [id number]. Medication history obtained from [facility], pt has 2 profiles at pharmacy (initially, pharmacy believed pt did not have any recent fill history).  Pt's son's phone# (Pavan Hassan) incorrect in Patient Info tab, should be [phone]  Per pharmacy, prescribing physician as follows: Dr. Buster Paulson [phone] at [facility], [address] [id number]

## 2019-06-25 NOTE — H&P ADULT - NSICDXPASTMEDICALHX_GEN_ALL_CORE_FT
PAST MEDICAL HISTORY:  Anemia     Chronic low back pain     Dementia     DM (diabetes mellitus)     HTN (hypertension)     OA (osteoarthritis)     Obesity     Shingles

## 2019-06-26 DIAGNOSIS — Z71.89 OTHER SPECIFIED COUNSELING: ICD-10-CM

## 2019-06-26 PROBLEM — D64.9 ANEMIA, UNSPECIFIED: Chronic | Status: ACTIVE | Noted: 2018-10-28

## 2019-06-26 LAB
ALBUMIN SERPL ELPH-MCNC: 4.5 G/DL — SIGNIFICANT CHANGE UP (ref 3.3–5)
ALP SERPL-CCNC: 72 U/L — SIGNIFICANT CHANGE UP (ref 40–120)
ALT FLD-CCNC: 15 U/L — SIGNIFICANT CHANGE UP (ref 4–33)
ANION GAP SERPL CALC-SCNC: 17 MMO/L — HIGH (ref 7–14)
AST SERPL-CCNC: 33 U/L — HIGH (ref 4–32)
BASOPHILS # BLD AUTO: 0.08 K/UL — SIGNIFICANT CHANGE UP (ref 0–0.2)
BASOPHILS NFR BLD AUTO: 0.8 % — SIGNIFICANT CHANGE UP (ref 0–2)
BILIRUB SERPL-MCNC: 0.3 MG/DL — SIGNIFICANT CHANGE UP (ref 0.2–1.2)
BUN SERPL-MCNC: 21 MG/DL — SIGNIFICANT CHANGE UP (ref 7–23)
CA-I BLD-SCNC: 1.34 MMOL/L — HIGH (ref 1.03–1.23)
CALCIUM SERPL-MCNC: 10.4 MG/DL — SIGNIFICANT CHANGE UP (ref 8.4–10.5)
CHLORIDE SERPL-SCNC: 103 MMOL/L — SIGNIFICANT CHANGE UP (ref 98–107)
CHOLEST SERPL-MCNC: 179 MG/DL — SIGNIFICANT CHANGE UP (ref 120–199)
CO2 SERPL-SCNC: 21 MMOL/L — LOW (ref 22–31)
CREAT SERPL-MCNC: 1.27 MG/DL — SIGNIFICANT CHANGE UP (ref 0.5–1.3)
EOSINOPHIL # BLD AUTO: 0.37 K/UL — SIGNIFICANT CHANGE UP (ref 0–0.5)
EOSINOPHIL NFR BLD AUTO: 3.7 % — SIGNIFICANT CHANGE UP (ref 0–6)
GLUCOSE BLDC GLUCOMTR-MCNC: 139 MG/DL — HIGH (ref 70–99)
GLUCOSE BLDC GLUCOMTR-MCNC: 168 MG/DL — HIGH (ref 70–99)
GLUCOSE BLDC GLUCOMTR-MCNC: 172 MG/DL — HIGH (ref 70–99)
GLUCOSE BLDC GLUCOMTR-MCNC: 187 MG/DL — HIGH (ref 70–99)
GLUCOSE SERPL-MCNC: 135 MG/DL — HIGH (ref 70–99)
HBA1C BLD-MCNC: 6 % — HIGH (ref 4–5.6)
HCT VFR BLD CALC: 32.2 % — LOW (ref 34.5–45)
HDLC SERPL-MCNC: 71 MG/DL — HIGH (ref 45–65)
HGB BLD-MCNC: 10.5 G/DL — LOW (ref 11.5–15.5)
IMM GRANULOCYTES NFR BLD AUTO: 0.3 % — SIGNIFICANT CHANGE UP (ref 0–1.5)
LIPID PNL WITH DIRECT LDL SERPL: 95 MG/DL — SIGNIFICANT CHANGE UP
LYMPHOCYTES # BLD AUTO: 4.3 K/UL — HIGH (ref 1–3.3)
LYMPHOCYTES # BLD AUTO: 43.2 % — SIGNIFICANT CHANGE UP (ref 13–44)
MAGNESIUM SERPL-MCNC: 1.6 MG/DL — SIGNIFICANT CHANGE UP (ref 1.6–2.6)
MCHC RBC-ENTMCNC: 32.6 % — SIGNIFICANT CHANGE UP (ref 32–36)
MCHC RBC-ENTMCNC: 32.9 PG — SIGNIFICANT CHANGE UP (ref 27–34)
MCV RBC AUTO: 100.9 FL — HIGH (ref 80–100)
MONOCYTES # BLD AUTO: 0.64 K/UL — SIGNIFICANT CHANGE UP (ref 0–0.9)
MONOCYTES NFR BLD AUTO: 6.4 % — SIGNIFICANT CHANGE UP (ref 2–14)
NEUTROPHILS # BLD AUTO: 4.54 K/UL — SIGNIFICANT CHANGE UP (ref 1.8–7.4)
NEUTROPHILS NFR BLD AUTO: 45.6 % — SIGNIFICANT CHANGE UP (ref 43–77)
NRBC # FLD: 0 K/UL — SIGNIFICANT CHANGE UP (ref 0–0)
PHOSPHATE SERPL-MCNC: 3.9 MG/DL — SIGNIFICANT CHANGE UP (ref 2.5–4.5)
PLATELET # BLD AUTO: 300 K/UL — SIGNIFICANT CHANGE UP (ref 150–400)
PMV BLD: 10.4 FL — SIGNIFICANT CHANGE UP (ref 7–13)
POTASSIUM SERPL-MCNC: 3.9 MMOL/L — SIGNIFICANT CHANGE UP (ref 3.5–5.3)
POTASSIUM SERPL-SCNC: 3.9 MMOL/L — SIGNIFICANT CHANGE UP (ref 3.5–5.3)
PROT SERPL-MCNC: 7.6 G/DL — SIGNIFICANT CHANGE UP (ref 6–8.3)
PROT SERPL-MCNC: 8.2 G/DL — SIGNIFICANT CHANGE UP (ref 6–8.3)
PROT UR-MCNC: 11.4 MG/DL — SIGNIFICANT CHANGE UP
PTH-INTACT SERPL-MCNC: 67.85 PG/ML — HIGH (ref 15–65)
RBC # BLD: 3.19 M/UL — LOW (ref 3.8–5.2)
RBC # FLD: 12.6 % — SIGNIFICANT CHANGE UP (ref 10.3–14.5)
SODIUM SERPL-SCNC: 141 MMOL/L — SIGNIFICANT CHANGE UP (ref 135–145)
TRIGL SERPL-MCNC: 83 MG/DL — SIGNIFICANT CHANGE UP (ref 10–149)
TSH SERPL-MCNC: 3.09 UIU/ML — SIGNIFICANT CHANGE UP (ref 0.27–4.2)
WBC # BLD: 9.96 K/UL — SIGNIFICANT CHANGE UP (ref 3.8–10.5)
WBC # FLD AUTO: 9.96 K/UL — SIGNIFICANT CHANGE UP (ref 3.8–10.5)

## 2019-06-26 PROCEDURE — 99223 1ST HOSP IP/OBS HIGH 75: CPT | Mod: GC

## 2019-06-26 PROCEDURE — 84166 PROTEIN E-PHORESIS/URINE/CSF: CPT | Mod: 26

## 2019-06-26 PROCEDURE — 84165 PROTEIN E-PHORESIS SERUM: CPT | Mod: 26

## 2019-06-26 RX ORDER — ACETAMINOPHEN 500 MG
650 TABLET ORAL EVERY 6 HOURS
Refills: 0 | Status: DISCONTINUED | OUTPATIENT
Start: 2019-06-26 | End: 2019-07-02

## 2019-06-26 RX ADMIN — Medication 650 MILLIGRAM(S): at 11:04

## 2019-06-26 RX ADMIN — CEFTRIAXONE 100 MILLIGRAM(S): 500 INJECTION, POWDER, FOR SOLUTION INTRAMUSCULAR; INTRAVENOUS at 00:10

## 2019-06-26 RX ADMIN — NEBIVOLOL HYDROCHLORIDE 2.5 MILLIGRAM(S): 5 TABLET ORAL at 06:39

## 2019-06-26 RX ADMIN — HEPARIN SODIUM 5000 UNIT(S): 5000 INJECTION INTRAVENOUS; SUBCUTANEOUS at 06:39

## 2019-06-26 RX ADMIN — HEPARIN SODIUM 5000 UNIT(S): 5000 INJECTION INTRAVENOUS; SUBCUTANEOUS at 14:43

## 2019-06-26 RX ADMIN — Medication 1: at 18:07

## 2019-06-26 RX ADMIN — AMLODIPINE BESYLATE 5 MILLIGRAM(S): 2.5 TABLET ORAL at 18:07

## 2019-06-26 RX ADMIN — Medication 1: at 12:52

## 2019-06-26 RX ADMIN — Medication 81 MILLIGRAM(S): at 12:14

## 2019-06-26 RX ADMIN — SODIUM CHLORIDE 75 MILLILITER(S): 9 INJECTION INTRAMUSCULAR; INTRAVENOUS; SUBCUTANEOUS at 11:07

## 2019-06-26 RX ADMIN — HEPARIN SODIUM 5000 UNIT(S): 5000 INJECTION INTRAVENOUS; SUBCUTANEOUS at 22:10

## 2019-06-26 RX ADMIN — ATORVASTATIN CALCIUM 10 MILLIGRAM(S): 80 TABLET, FILM COATED ORAL at 22:10

## 2019-06-26 RX ADMIN — MEMANTINE HYDROCHLORIDE 10 MILLIGRAM(S): 10 TABLET ORAL at 12:14

## 2019-06-26 RX ADMIN — Medication 650 MILLIGRAM(S): at 10:18

## 2019-06-26 NOTE — PHYSICAL THERAPY INITIAL EVALUATION ADULT - ADDITIONAL COMMENTS
Per documentation, pt. has HHA for 7 days/week, 10 hours/day.    Pt. was left in restroom post PT Evaluation, NAD, all lines intact. GERMAN Moore made aware of pt. status and participation in PT.

## 2019-06-26 NOTE — PROGRESS NOTE ADULT - ASSESSMENT
Dizziness  unclear if purely vertigo  monitor on tele  echo   neurology eval appreciated, plan for MRI     HTN  stable  cont current meds    DM  Monitor finger stick. Insulin coverage. Diabetic education and Diabetic diet. Consider nutrition consultation.

## 2019-06-26 NOTE — PROGRESS NOTE ADULT - ASSESSMENT
89 year old Creole speaking female, presents with dizziness x 1 week, nephrology consulted for hypercalcemia    Hypercalcemia  ? etiology  possible dehydration, however urine specific gravity is not high does not support dehydration  Ca improved with IVF  check SPEP, SIF, immuno lightchain to r/o multiplemyloma   check vit d 25, vit d 1,25, pth and phos level  avoid calcium supplements  monitor bmp    cKD stage 3  baseline ~ 1-1.1  stable  likely sec to age/HTN/DM  avoid nephrotoxic agent  monitor renal function     HTN  controlled  monitor BP    dizziness  ? etiology  CT done  f/u neuro

## 2019-06-26 NOTE — PHYSICAL THERAPY INITIAL EVALUATION ADULT - CRITERIA FOR SKILLED THERAPEUTIC INTERVENTIONS
predicted duration of therapy intervention/therapy frequency/anticipated discharge recommendation/impairments found/risk reduction/prevention/rehab potential

## 2019-06-26 NOTE — PROGRESS NOTE ADULT - ASSESSMENT
Problem/Plan - 1:  ·  Problem: Near syncope.  Plan: telemonitor  cards and neuro fu  check MRI     Problem/Plan - 2:  ·  Problem: Vertigo.  Plan: Meclizine prn  neuro fu     Problem/Plan - 3:  ·  Problem: UTI (urinary tract infection).  Plan: F/U Urine C/S. Continue ceftriaxone.     Problem/Plan - 4:  ·  Problem: Hypercalcemia.  Plan: nephrology fu  ivf  management as per renal      Problem/Plan - 5:  ·  Problem: HTN (hypertension).  Plan: cw current meds     Problem/Plan - 6:  Problem: Dementia. Plan: PT mira.

## 2019-06-27 LAB
ANION GAP SERPL CALC-SCNC: 21 MMO/L — HIGH (ref 7–14)
BUN SERPL-MCNC: 23 MG/DL — SIGNIFICANT CHANGE UP (ref 7–23)
CALCIUM SERPL-MCNC: 9.9 MG/DL — SIGNIFICANT CHANGE UP (ref 8.4–10.5)
CHLORIDE SERPL-SCNC: 105 MMOL/L — SIGNIFICANT CHANGE UP (ref 98–107)
CO2 SERPL-SCNC: 13 MMOL/L — LOW (ref 22–31)
CREAT SERPL-MCNC: 1.11 MG/DL — SIGNIFICANT CHANGE UP (ref 0.5–1.3)
GLUCOSE BLDC GLUCOMTR-MCNC: 126 MG/DL — HIGH (ref 70–99)
GLUCOSE BLDC GLUCOMTR-MCNC: 146 MG/DL — HIGH (ref 70–99)
GLUCOSE BLDC GLUCOMTR-MCNC: 169 MG/DL — HIGH (ref 70–99)
GLUCOSE BLDC GLUCOMTR-MCNC: 173 MG/DL — HIGH (ref 70–99)
GLUCOSE SERPL-MCNC: 128 MG/DL — HIGH (ref 70–99)
HCT VFR BLD CALC: 35.3 % — SIGNIFICANT CHANGE UP (ref 34.5–45)
HGB BLD-MCNC: 10.9 G/DL — LOW (ref 11.5–15.5)
MAGNESIUM SERPL-MCNC: 1.7 MG/DL — SIGNIFICANT CHANGE UP (ref 1.6–2.6)
MCHC RBC-ENTMCNC: 30.9 % — LOW (ref 32–36)
MCHC RBC-ENTMCNC: 32.7 PG — SIGNIFICANT CHANGE UP (ref 27–34)
MCV RBC AUTO: 106 FL — HIGH (ref 80–100)
NRBC # FLD: 0 K/UL — SIGNIFICANT CHANGE UP (ref 0–0)
PHOSPHATE SERPL-MCNC: 4.1 MG/DL — SIGNIFICANT CHANGE UP (ref 2.5–4.5)
PLATELET # BLD AUTO: 249 K/UL — SIGNIFICANT CHANGE UP (ref 150–400)
PMV BLD: 10.7 FL — SIGNIFICANT CHANGE UP (ref 7–13)
POTASSIUM SERPL-MCNC: 4.7 MMOL/L — SIGNIFICANT CHANGE UP (ref 3.5–5.3)
POTASSIUM SERPL-SCNC: 4.7 MMOL/L — SIGNIFICANT CHANGE UP (ref 3.5–5.3)
RBC # BLD: 3.33 M/UL — LOW (ref 3.8–5.2)
RBC # FLD: 13.2 % — SIGNIFICANT CHANGE UP (ref 10.3–14.5)
SODIUM SERPL-SCNC: 139 MMOL/L — SIGNIFICANT CHANGE UP (ref 135–145)
T3 SERPL-MCNC: 51 NG/DL — LOW (ref 80–200)
T4 AB SER-ACNC: 4.45 UG/DL — LOW (ref 5.1–13)
VIT B12 SERPL-MCNC: > 2000 PG/ML — HIGH (ref 200–900)
WBC # BLD: 8.75 K/UL — SIGNIFICANT CHANGE UP (ref 3.8–10.5)
WBC # FLD AUTO: 8.75 K/UL — SIGNIFICANT CHANGE UP (ref 3.8–10.5)

## 2019-06-27 PROCEDURE — 70551 MRI BRAIN STEM W/O DYE: CPT | Mod: 26

## 2019-06-27 PROCEDURE — 93306 TTE W/DOPPLER COMPLETE: CPT | Mod: 26

## 2019-06-27 RX ORDER — CLOPIDOGREL BISULFATE 75 MG/1
75 TABLET, FILM COATED ORAL DAILY
Refills: 0 | Status: DISCONTINUED | OUTPATIENT
Start: 2019-06-27 | End: 2019-07-02

## 2019-06-27 RX ADMIN — CEFTRIAXONE 100 MILLIGRAM(S): 500 INJECTION, POWDER, FOR SOLUTION INTRAMUSCULAR; INTRAVENOUS at 00:28

## 2019-06-27 RX ADMIN — ATORVASTATIN CALCIUM 10 MILLIGRAM(S): 80 TABLET, FILM COATED ORAL at 22:19

## 2019-06-27 RX ADMIN — NEBIVOLOL HYDROCHLORIDE 2.5 MILLIGRAM(S): 5 TABLET ORAL at 05:30

## 2019-06-27 RX ADMIN — HEPARIN SODIUM 5000 UNIT(S): 5000 INJECTION INTRAVENOUS; SUBCUTANEOUS at 05:30

## 2019-06-27 RX ADMIN — Medication 1: at 17:50

## 2019-06-27 RX ADMIN — HEPARIN SODIUM 5000 UNIT(S): 5000 INJECTION INTRAVENOUS; SUBCUTANEOUS at 13:20

## 2019-06-27 RX ADMIN — Medication 81 MILLIGRAM(S): at 12:13

## 2019-06-27 RX ADMIN — AMLODIPINE BESYLATE 5 MILLIGRAM(S): 2.5 TABLET ORAL at 17:50

## 2019-06-27 RX ADMIN — MEMANTINE HYDROCHLORIDE 10 MILLIGRAM(S): 10 TABLET ORAL at 12:13

## 2019-06-27 RX ADMIN — CLOPIDOGREL BISULFATE 75 MILLIGRAM(S): 75 TABLET, FILM COATED ORAL at 17:50

## 2019-06-27 RX ADMIN — HEPARIN SODIUM 5000 UNIT(S): 5000 INJECTION INTRAVENOUS; SUBCUTANEOUS at 22:19

## 2019-06-27 NOTE — DIETITIAN INITIAL EVALUATION ADULT. - FACTORS AFF FOOD INTAKE
Per RN, feeds self. No GI distress (nausea/vomiting/diarrhea/constipation.) No chewing or swallowing difficulties at this time.

## 2019-06-27 NOTE — PROGRESS NOTE ADULT - ASSESSMENT
Dizziness  unclear if purely vertigo  monitor on tele  echo   fu with neurology  MRI reviewed     HTN  stable  cont current meds    DM  Monitor finger stick. Insulin coverage. Diabetic education and Diabetic diet. Consider nutrition consultation.

## 2019-06-27 NOTE — DIETITIAN INITIAL EVALUATION ADULT. - PERTINENT LABORATORY DATA
06-27 Na 139 mmol/L Glu 128 mg/dL<H> K+ 4.7 mmol/L Cr 1.11 mg/dL BUN 23 mg/dL Phos 4.1 mg/dL  06-26-19 HbA1c 6.0 %  06-27 @ 08:45 POCT 126 mg/dL  06-26 @ 21:18 POCT 172 mg/dL  06-26 @ 17:33 POCT 187 mg/dL  06-26 @ 12:30 POCT 168 mg/dL

## 2019-06-27 NOTE — DIETITIAN INITIAL EVALUATION ADULT. - ENERGY NEEDS
Ht: 66 in (estimated) Wt: (dosing) 175.2 pounds BMI: 28.3   IBW: 130 pounds   Edema: none  Pressure Injuries: none

## 2019-06-27 NOTE — DIETITIAN INITIAL EVALUATION ADULT. - PERTINENT MEDS FT
MEDICATIONS  (STANDING):  amLODIPine   Tablet 5 milliGRAM(s) Oral daily  aspirin enteric coated 81 milliGRAM(s) Oral daily  atorvastatin 10 milliGRAM(s) Oral at bedtime  cefTRIAXone   IVPB 1 milliGRAM(s) IV Intermittent every 24 hours  dextrose 5%. 1000 milliLiter(s) (50 mL/Hr) IV Continuous <Continuous>  dextrose 50% Injectable 12.5 Gram(s) IV Push once  dextrose 50% Injectable 25 Gram(s) IV Push once  dextrose 50% Injectable 25 Gram(s) IV Push once  heparin  Injectable 5000 Unit(s) SubCutaneous every 8 hours  insulin lispro (HumaLOG) corrective regimen sliding scale   SubCutaneous three times a day before meals  memantine 10 milliGRAM(s) Oral daily  nebivolol 2.5 milliGRAM(s) Oral daily  sodium chloride 0.9%. 1000 milliLiter(s) (75 mL/Hr) IV Continuous <Continuous>

## 2019-06-27 NOTE — DIETITIAN INITIAL EVALUATION ADULT. - REASON INDICATOR FOR ASSESSMENT
Education. Pt is A&Ox2- confused, British speaking unable to use  phone. Spoke with RN and PCA for collateral.

## 2019-06-27 NOTE — PROGRESS NOTE ADULT - ASSESSMENT
89 year old Creole speaking female, presents with dizziness x 1 week, nephrology consulted for hypercalcemia    Hypercalcemia  ? etiology  PTH elevated, suggested of primary hyperparathyroidism vs familial hypocalciuric hypercalcemia   Can consider 24 hr urine collection for calcium, or can be monitor and work up outpatient   Ca improved with IVF  pending SPEP, SIF, immuno lightchain to r/o multiplemyloma   pending vit d 25, vit d 1,25  avoid calcium supplements  monitor bmp    cKD stage 3  baseline ~ 1-1.1  stable  likely sec to age/HTN/DM  avoid nephrotoxic agent  monitor renal function     HTN  controlled  monitor BP    Acidosis  ? etiology  AG  bicarb relatively normal before  repeat bmp     dizziness  ? etiology  CT done  f/u neuro

## 2019-06-27 NOTE — DIETITIAN INITIAL EVALUATION ADULT. - PROBLEM SELECTOR PLAN 1
Admit to Telemetry, serial CE's, CTA H&N done, check Echo & Orthostatics. Neuro c/s called. F/U MD note

## 2019-06-27 NOTE — DIETITIAN INITIAL EVALUATION ADULT. - OTHER INFO
89 y/o F admitted with near syncope/vertigo a/w hypercalcemia. PMH includes obesity, dementia, DM2. Unable to obtain nutrition/weight history PTA.

## 2019-06-27 NOTE — CHART NOTE - NSCHARTNOTEFT_GEN_A_CORE
MRI reviewed : no stroke. Further care as per  MRI reviewed : no stroke. Further care as per   case discussed with stroke attending

## 2019-06-27 NOTE — PROGRESS NOTE ADULT - ASSESSMENT
attmpted creole translation - but memory limited also--The patient is a 89y Female with a history of HT,DM, admitted w c/o dizziness and UTI- dizziness only upright, dilpopia-?Yrs?, w hearing loss, no c/o of lateralized sx  d/w tele PA to arrange  MRI- my prelim (-)  stroke consult called for multiple regions of severe intacranial stenosis also ?Kalyn Mccain-- doubt symptomatic but poor historian- await their rec after confirming if No stroke  concern for dementia if mental state does not improve as outpt and rec f/u--sent B12/TFT

## 2019-06-28 LAB
ANION GAP SERPL CALC-SCNC: 15 MMO/L — HIGH (ref 7–14)
BUN SERPL-MCNC: 23 MG/DL — SIGNIFICANT CHANGE UP (ref 7–23)
CALCIUM SERPL-MCNC: 9.5 MG/DL — SIGNIFICANT CHANGE UP (ref 8.4–10.5)
CHLORIDE SERPL-SCNC: 107 MMOL/L — SIGNIFICANT CHANGE UP (ref 98–107)
CO2 SERPL-SCNC: 19 MMOL/L — LOW (ref 22–31)
CREAT SERPL-MCNC: 1.18 MG/DL — SIGNIFICANT CHANGE UP (ref 0.5–1.3)
GLUCOSE BLDC GLUCOMTR-MCNC: 143 MG/DL — HIGH (ref 70–99)
GLUCOSE BLDC GLUCOMTR-MCNC: 168 MG/DL — HIGH (ref 70–99)
GLUCOSE BLDC GLUCOMTR-MCNC: 182 MG/DL — HIGH (ref 70–99)
GLUCOSE BLDC GLUCOMTR-MCNC: 194 MG/DL — HIGH (ref 70–99)
GLUCOSE BLDC GLUCOMTR-MCNC: 208 MG/DL — HIGH (ref 70–99)
GLUCOSE SERPL-MCNC: 134 MG/DL — HIGH (ref 70–99)
HCT VFR BLD CALC: 31 % — LOW (ref 34.5–45)
HGB BLD-MCNC: 9.9 G/DL — LOW (ref 11.5–15.5)
MAGNESIUM SERPL-MCNC: 1.8 MG/DL — SIGNIFICANT CHANGE UP (ref 1.6–2.6)
MCHC RBC-ENTMCNC: 31.9 % — LOW (ref 32–36)
MCHC RBC-ENTMCNC: 33.2 PG — SIGNIFICANT CHANGE UP (ref 27–34)
MCV RBC AUTO: 104 FL — HIGH (ref 80–100)
NRBC # FLD: 0 K/UL — SIGNIFICANT CHANGE UP (ref 0–0)
PLATELET # BLD AUTO: 254 K/UL — SIGNIFICANT CHANGE UP (ref 150–400)
PMV BLD: 10.6 FL — SIGNIFICANT CHANGE UP (ref 7–13)
POTASSIUM SERPL-MCNC: 3.8 MMOL/L — SIGNIFICANT CHANGE UP (ref 3.5–5.3)
POTASSIUM SERPL-SCNC: 3.8 MMOL/L — SIGNIFICANT CHANGE UP (ref 3.5–5.3)
RBC # BLD: 2.98 M/UL — LOW (ref 3.8–5.2)
RBC # FLD: 13 % — SIGNIFICANT CHANGE UP (ref 10.3–14.5)
SODIUM SERPL-SCNC: 141 MMOL/L — SIGNIFICANT CHANGE UP (ref 135–145)
WBC # BLD: 8.11 K/UL — SIGNIFICANT CHANGE UP (ref 3.8–10.5)
WBC # FLD AUTO: 8.11 K/UL — SIGNIFICANT CHANGE UP (ref 3.8–10.5)

## 2019-06-28 RX ADMIN — MEMANTINE HYDROCHLORIDE 10 MILLIGRAM(S): 10 TABLET ORAL at 13:15

## 2019-06-28 RX ADMIN — CLOPIDOGREL BISULFATE 75 MILLIGRAM(S): 75 TABLET, FILM COATED ORAL at 13:15

## 2019-06-28 RX ADMIN — Medication 1: at 17:59

## 2019-06-28 RX ADMIN — Medication 650 MILLIGRAM(S): at 19:00

## 2019-06-28 RX ADMIN — CEFTRIAXONE 100 MILLIGRAM(S): 500 INJECTION, POWDER, FOR SOLUTION INTRAMUSCULAR; INTRAVENOUS at 00:18

## 2019-06-28 RX ADMIN — HEPARIN SODIUM 5000 UNIT(S): 5000 INJECTION INTRAVENOUS; SUBCUTANEOUS at 05:26

## 2019-06-28 RX ADMIN — Medication 650 MILLIGRAM(S): at 18:00

## 2019-06-28 RX ADMIN — Medication 650 MILLIGRAM(S): at 09:48

## 2019-06-28 RX ADMIN — HEPARIN SODIUM 5000 UNIT(S): 5000 INJECTION INTRAVENOUS; SUBCUTANEOUS at 22:43

## 2019-06-28 RX ADMIN — Medication 1: at 13:28

## 2019-06-28 RX ADMIN — Medication 650 MILLIGRAM(S): at 09:06

## 2019-06-28 RX ADMIN — AMLODIPINE BESYLATE 5 MILLIGRAM(S): 2.5 TABLET ORAL at 17:59

## 2019-06-28 RX ADMIN — ATORVASTATIN CALCIUM 10 MILLIGRAM(S): 80 TABLET, FILM COATED ORAL at 22:43

## 2019-06-28 RX ADMIN — HEPARIN SODIUM 5000 UNIT(S): 5000 INJECTION INTRAVENOUS; SUBCUTANEOUS at 13:15

## 2019-06-28 RX ADMIN — NEBIVOLOL HYDROCHLORIDE 2.5 MILLIGRAM(S): 5 TABLET ORAL at 05:27

## 2019-06-28 RX ADMIN — Medication 81 MILLIGRAM(S): at 13:15

## 2019-06-28 NOTE — PROGRESS NOTE ADULT - ASSESSMENT
Problem/Plan - 1:  ·  Problem: Near syncope.  Plan: telemonitor  cards and neuro fu  MRI reviewed    Problem/Plan - 2:  ·  Problem: Vertigo.  Plan: Meclizine prn  neuro fu     Problem/Plan - 3:  ·  Problem: UTI (urinary tract infection).  Plan: F/U Urine C/S. Continue ceftriaxone.     Problem/Plan - 4:  ·  Problem: Hypercalcemia.  Plan: nephrology fu  ivf  management as per renal      Problem/Plan - 5:  ·  Problem: HTN (hypertension).  Plan: cw current meds     Problem/Plan - 6:  Problem: Dementia. Plan: PT eval.    dc planning once cleared by neuro

## 2019-06-28 NOTE — PROGRESS NOTE ADULT - ASSESSMENT
89 year old Creole speaking female, presents with dizziness x 1 week, nephrology consulted for hypercalcemia    Hypercalcemia  ? etiology  PTH elevated, suggested of primary hyperparathyroidism vs familial hypocalciuric hypercalcemia   Can consider 24 hr urine collection for calcium, or can be monitor and work up outpatient   Ca improved with IVF  pending SPEP, SIF, immuno lightchain to r/o multiplemyloma   pending vit d 25, vit d 1,25  avoid calcium supplements  monitor bmp    cKD stage 3  baseline ~ 1-1.1  stable  likely sec to age/HTN/DM  avoid nephrotoxic agent  monitor renal function     HTN  controlled  monitor BP    Acidosis  ? etiology  AG  better today  continue to monitor    dizziness  ? etiology  CT done  f/u neuro 89 year old Creole speaking female, presents with dizziness x 1 week, nephrology consulted for hypercalcemia    Hypercalcemia  ? etiology  PTH elevated, suggested of primary hyperparathyroidism vs familial hypocalciuric hypercalcemia   Can consider 24 hr urine collection for calcium, or can be monitor and work up outpatient   Ca improved with IVF  pending SPEP, SIF, immuno lightchain to r/o multiplemyloma   pending vit d 25, vit d 1,25  avoid calcium supplements  monitor bmp    cKD stage 3  baseline ~ 1-1.1  stable  likely sec to age/HTN/DM  avoid nephrotoxic agent  monitor renal function     HTN  controlled  monitor BP    Acidosis  ? etiology  better today  continue to monitor    dizziness  ? etiology  CT done  f/u neuro No

## 2019-06-29 LAB
ANION GAP SERPL CALC-SCNC: 13 MMO/L — SIGNIFICANT CHANGE UP (ref 7–14)
BUN SERPL-MCNC: 22 MG/DL — SIGNIFICANT CHANGE UP (ref 7–23)
CALCIUM SERPL-MCNC: 9.5 MG/DL — SIGNIFICANT CHANGE UP (ref 8.4–10.5)
CHLORIDE SERPL-SCNC: 109 MMOL/L — HIGH (ref 98–107)
CO2 SERPL-SCNC: 20 MMOL/L — LOW (ref 22–31)
CREAT SERPL-MCNC: 1.13 MG/DL — SIGNIFICANT CHANGE UP (ref 0.5–1.3)
GLUCOSE BLDC GLUCOMTR-MCNC: 135 MG/DL — HIGH (ref 70–99)
GLUCOSE BLDC GLUCOMTR-MCNC: 136 MG/DL — HIGH (ref 70–99)
GLUCOSE BLDC GLUCOMTR-MCNC: 154 MG/DL — HIGH (ref 70–99)
GLUCOSE BLDC GLUCOMTR-MCNC: 221 MG/DL — HIGH (ref 70–99)
GLUCOSE SERPL-MCNC: 135 MG/DL — HIGH (ref 70–99)
HCT VFR BLD CALC: 29.6 % — LOW (ref 34.5–45)
HGB BLD-MCNC: 9.5 G/DL — LOW (ref 11.5–15.5)
MCHC RBC-ENTMCNC: 32.1 % — SIGNIFICANT CHANGE UP (ref 32–36)
MCHC RBC-ENTMCNC: 33.1 PG — SIGNIFICANT CHANGE UP (ref 27–34)
MCV RBC AUTO: 103.1 FL — HIGH (ref 80–100)
NRBC # FLD: 0 K/UL — SIGNIFICANT CHANGE UP (ref 0–0)
PLATELET # BLD AUTO: 239 K/UL — SIGNIFICANT CHANGE UP (ref 150–400)
PMV BLD: 10.5 FL — SIGNIFICANT CHANGE UP (ref 7–13)
POTASSIUM SERPL-MCNC: 4.1 MMOL/L — SIGNIFICANT CHANGE UP (ref 3.5–5.3)
POTASSIUM SERPL-SCNC: 4.1 MMOL/L — SIGNIFICANT CHANGE UP (ref 3.5–5.3)
RBC # BLD: 2.87 M/UL — LOW (ref 3.8–5.2)
RBC # FLD: 13.1 % — SIGNIFICANT CHANGE UP (ref 10.3–14.5)
SODIUM SERPL-SCNC: 142 MMOL/L — SIGNIFICANT CHANGE UP (ref 135–145)
WBC # BLD: 7.44 K/UL — SIGNIFICANT CHANGE UP (ref 3.8–10.5)
WBC # FLD AUTO: 7.44 K/UL — SIGNIFICANT CHANGE UP (ref 3.8–10.5)

## 2019-06-29 RX ORDER — LANOLIN ALCOHOL/MO/W.PET/CERES
3 CREAM (GRAM) TOPICAL AT BEDTIME
Refills: 0 | Status: DISCONTINUED | OUTPATIENT
Start: 2019-06-29 | End: 2019-07-02

## 2019-06-29 RX ADMIN — MEMANTINE HYDROCHLORIDE 10 MILLIGRAM(S): 10 TABLET ORAL at 12:26

## 2019-06-29 RX ADMIN — ATORVASTATIN CALCIUM 10 MILLIGRAM(S): 80 TABLET, FILM COATED ORAL at 21:23

## 2019-06-29 RX ADMIN — HEPARIN SODIUM 5000 UNIT(S): 5000 INJECTION INTRAVENOUS; SUBCUTANEOUS at 21:23

## 2019-06-29 RX ADMIN — CLOPIDOGREL BISULFATE 75 MILLIGRAM(S): 75 TABLET, FILM COATED ORAL at 12:25

## 2019-06-29 RX ADMIN — CEFTRIAXONE 100 MILLIGRAM(S): 500 INJECTION, POWDER, FOR SOLUTION INTRAMUSCULAR; INTRAVENOUS at 00:25

## 2019-06-29 RX ADMIN — Medication 81 MILLIGRAM(S): at 12:26

## 2019-06-29 RX ADMIN — Medication 3 MILLIGRAM(S): at 21:23

## 2019-06-29 RX ADMIN — HEPARIN SODIUM 5000 UNIT(S): 5000 INJECTION INTRAVENOUS; SUBCUTANEOUS at 13:48

## 2019-06-29 RX ADMIN — Medication 1: at 18:20

## 2019-06-29 RX ADMIN — NEBIVOLOL HYDROCHLORIDE 2.5 MILLIGRAM(S): 5 TABLET ORAL at 06:22

## 2019-06-29 RX ADMIN — AMLODIPINE BESYLATE 5 MILLIGRAM(S): 2.5 TABLET ORAL at 17:24

## 2019-06-29 RX ADMIN — HEPARIN SODIUM 5000 UNIT(S): 5000 INJECTION INTRAVENOUS; SUBCUTANEOUS at 06:22

## 2019-06-29 NOTE — PROGRESS NOTE ADULT - ASSESSMENT
Problem/Plan - 1:  ·  Problem: Near syncope.  Plan: telemonitor  cards and neuro fu  MRI reviewed    Problem/Plan - 2:  ·  Problem: Vertigo.  Plan: Meclizine prn  neuro fu     Problem/Plan - 3:  ·  Problem: UTI (urinary tract infection).  Plan: F/U Urine C/S. Continue ceftriaxone.     Problem/Plan - 4:  ·  Problem: Hypercalcemia.  Plan: nephrology fu  ivf  management as per renal    Problem/Plan - 5:  ·  Problem: HTN (hypertension).  Plan: cw current meds     Problem/Plan - 6:  Problem: Dementia. Plan: PT mira.    neuro fu pending

## 2019-06-29 NOTE — PROGRESS NOTE ADULT - ASSESSMENT
89 year old Creole speaking female, presents with dizziness x 1 week, nephrology consulted for hypercalcemia    Hypercalcemia  PTH elevated, suggested of primary hyperparathyroidism vs familial hypocalciuric hypercalcemia   Can consider 24 hr urine collection for calcium, or can be monitor and work up outpatient   Ca improved with IVF  pending SPEP, SIF, immuno lightchain to r/o multiplemyloma   pending vit d 25, vit d 1,25  Avoid calcium supplements  Monitor serum calcium     CKD stage 3  baseline ~ 1-1.1  stable renal function   likely sec to age/HTN/DM  avoid nephrotoxic agent  monitor renal function     HTN  controlled  monitor BP    Acidosis  improving   continue to monitor serum Co2

## 2019-06-30 LAB
ANION GAP SERPL CALC-SCNC: 14 MMO/L — SIGNIFICANT CHANGE UP (ref 7–14)
BASOPHILS # BLD AUTO: 0.09 K/UL — SIGNIFICANT CHANGE UP (ref 0–0.2)
BASOPHILS NFR BLD AUTO: 1 % — SIGNIFICANT CHANGE UP (ref 0–2)
BUN SERPL-MCNC: 24 MG/DL — HIGH (ref 7–23)
CALCIUM SERPL-MCNC: 10.2 MG/DL — SIGNIFICANT CHANGE UP (ref 8.4–10.5)
CHLORIDE SERPL-SCNC: 109 MMOL/L — HIGH (ref 98–107)
CO2 SERPL-SCNC: 18 MMOL/L — LOW (ref 22–31)
CREAT SERPL-MCNC: 1.15 MG/DL — SIGNIFICANT CHANGE UP (ref 0.5–1.3)
EOSINOPHIL # BLD AUTO: 0.28 K/UL — SIGNIFICANT CHANGE UP (ref 0–0.5)
EOSINOPHIL NFR BLD AUTO: 3.1 % — SIGNIFICANT CHANGE UP (ref 0–6)
GLUCOSE BLDC GLUCOMTR-MCNC: 104 MG/DL — HIGH (ref 70–99)
GLUCOSE BLDC GLUCOMTR-MCNC: 140 MG/DL — HIGH (ref 70–99)
GLUCOSE BLDC GLUCOMTR-MCNC: 182 MG/DL — HIGH (ref 70–99)
GLUCOSE BLDC GLUCOMTR-MCNC: 206 MG/DL — HIGH (ref 70–99)
GLUCOSE SERPL-MCNC: 142 MG/DL — HIGH (ref 70–99)
HCT VFR BLD CALC: 30.6 % — LOW (ref 34.5–45)
HGB BLD-MCNC: 9.6 G/DL — LOW (ref 11.5–15.5)
IMM GRANULOCYTES NFR BLD AUTO: 0.2 % — SIGNIFICANT CHANGE UP (ref 0–1.5)
LYMPHOCYTES # BLD AUTO: 4.64 K/UL — HIGH (ref 1–3.3)
LYMPHOCYTES # BLD AUTO: 51.2 % — HIGH (ref 13–44)
MAGNESIUM SERPL-MCNC: 2 MG/DL — SIGNIFICANT CHANGE UP (ref 1.6–2.6)
MCHC RBC-ENTMCNC: 31.4 % — LOW (ref 32–36)
MCHC RBC-ENTMCNC: 32 PG — SIGNIFICANT CHANGE UP (ref 27–34)
MCV RBC AUTO: 102 FL — HIGH (ref 80–100)
MONOCYTES # BLD AUTO: 0.65 K/UL — SIGNIFICANT CHANGE UP (ref 0–0.9)
MONOCYTES NFR BLD AUTO: 7.2 % — SIGNIFICANT CHANGE UP (ref 2–14)
NEUTROPHILS # BLD AUTO: 3.38 K/UL — SIGNIFICANT CHANGE UP (ref 1.8–7.4)
NEUTROPHILS NFR BLD AUTO: 37.3 % — LOW (ref 43–77)
NRBC # FLD: 0 K/UL — SIGNIFICANT CHANGE UP (ref 0–0)
PLATELET # BLD AUTO: 242 K/UL — SIGNIFICANT CHANGE UP (ref 150–400)
PMV BLD: 10.4 FL — SIGNIFICANT CHANGE UP (ref 7–13)
POTASSIUM SERPL-MCNC: 4.2 MMOL/L — SIGNIFICANT CHANGE UP (ref 3.5–5.3)
POTASSIUM SERPL-SCNC: 4.2 MMOL/L — SIGNIFICANT CHANGE UP (ref 3.5–5.3)
RBC # BLD: 3 M/UL — LOW (ref 3.8–5.2)
RBC # FLD: 13.1 % — SIGNIFICANT CHANGE UP (ref 10.3–14.5)
SODIUM SERPL-SCNC: 141 MMOL/L — SIGNIFICANT CHANGE UP (ref 135–145)
WBC # BLD: 9.06 K/UL — SIGNIFICANT CHANGE UP (ref 3.8–10.5)
WBC # FLD AUTO: 9.06 K/UL — SIGNIFICANT CHANGE UP (ref 3.8–10.5)

## 2019-06-30 RX ORDER — SODIUM BICARBONATE 1 MEQ/ML
650 SYRINGE (ML) INTRAVENOUS THREE TIMES A DAY
Refills: 0 | Status: DISCONTINUED | OUTPATIENT
Start: 2019-06-30 | End: 2019-07-02

## 2019-06-30 RX ADMIN — ATORVASTATIN CALCIUM 10 MILLIGRAM(S): 80 TABLET, FILM COATED ORAL at 22:09

## 2019-06-30 RX ADMIN — Medication 1: at 13:56

## 2019-06-30 RX ADMIN — Medication 3 MILLIGRAM(S): at 22:09

## 2019-06-30 RX ADMIN — MEMANTINE HYDROCHLORIDE 10 MILLIGRAM(S): 10 TABLET ORAL at 12:15

## 2019-06-30 RX ADMIN — AMLODIPINE BESYLATE 5 MILLIGRAM(S): 2.5 TABLET ORAL at 17:43

## 2019-06-30 RX ADMIN — HEPARIN SODIUM 5000 UNIT(S): 5000 INJECTION INTRAVENOUS; SUBCUTANEOUS at 22:09

## 2019-06-30 RX ADMIN — Medication 81 MILLIGRAM(S): at 12:15

## 2019-06-30 RX ADMIN — Medication 650 MILLIGRAM(S): at 10:39

## 2019-06-30 RX ADMIN — CEFTRIAXONE 100 MILLIGRAM(S): 500 INJECTION, POWDER, FOR SOLUTION INTRAMUSCULAR; INTRAVENOUS at 00:59

## 2019-06-30 RX ADMIN — Medication 650 MILLIGRAM(S): at 22:09

## 2019-06-30 RX ADMIN — NEBIVOLOL HYDROCHLORIDE 2.5 MILLIGRAM(S): 5 TABLET ORAL at 05:12

## 2019-06-30 RX ADMIN — CEFTRIAXONE 100 MILLIGRAM(S): 500 INJECTION, POWDER, FOR SOLUTION INTRAMUSCULAR; INTRAVENOUS at 23:20

## 2019-06-30 RX ADMIN — Medication 650 MILLIGRAM(S): at 13:57

## 2019-06-30 RX ADMIN — HEPARIN SODIUM 5000 UNIT(S): 5000 INJECTION INTRAVENOUS; SUBCUTANEOUS at 13:57

## 2019-06-30 RX ADMIN — CLOPIDOGREL BISULFATE 75 MILLIGRAM(S): 75 TABLET, FILM COATED ORAL at 12:15

## 2019-06-30 RX ADMIN — HEPARIN SODIUM 5000 UNIT(S): 5000 INJECTION INTRAVENOUS; SUBCUTANEOUS at 05:12

## 2019-06-30 RX ADMIN — Medication 650 MILLIGRAM(S): at 11:33

## 2019-06-30 NOTE — PROGRESS NOTE ADULT - ASSESSMENT
89 year old Creole speaking female, presents with dizziness x 1 week, nephrology consulted for hypercalcemia    Hypercalcemia  PTH elevated, suggested of primary hyperparathyroidism vs familial hypocalciuric hypercalcemia   Can consider 24 hr urine collection for calcium, or can be monitor and work up outpatient   Ca improved with IVF  pending SPEP, SIF, immuno lightchain to r/o multiplemyloma   pending vit d 25, vit d 1,25  Avoid calcium supplements  Monitor serum calcium     CKD stage 3  baseline ~ 1-1.1  stable renal function   likely sec to age/HTN/DM  avoid nephrotoxic agent  monitor renal function     HTN  acceptable  monitor BP    Acidosis  Non-AG  NaHCO3 650mg TID for 3 days  continue to monitor serum Co2

## 2019-06-30 NOTE — PROGRESS NOTE ADULT - ASSESSMENT
Problem/Plan - 1:  ·  Problem: Near syncope.  Plan: telemonitor  cards and neuro fu  MRI reviewed  neuro fu     Problem/Plan - 2:  ·  Problem: Vertigo.  Plan: Meclizine prn  neuro fu     Problem/Plan - 3:  ·  Problem: UTI (urinary tract infection).  Plan: F/U Urine C/S. Continue ceftriaxone.     Problem/Plan - 4:  ·  Problem: Hypercalcemia.  Plan: nephrology fu  ivf  management as per renal    Problem/Plan - 5:  ·  Problem: HTN (hypertension).  Plan: cw current meds     Problem/Plan - 6:  Problem: Dementia. Plan: PT mira.

## 2019-07-01 LAB
ANION GAP SERPL CALC-SCNC: 13 MMO/L — SIGNIFICANT CHANGE UP (ref 7–14)
BASOPHILS # BLD AUTO: 0.07 K/UL — SIGNIFICANT CHANGE UP (ref 0–0.2)
BASOPHILS NFR BLD AUTO: 0.9 % — SIGNIFICANT CHANGE UP (ref 0–2)
BUN SERPL-MCNC: 25 MG/DL — HIGH (ref 7–23)
CALCIUM SERPL-MCNC: 10.2 MG/DL — SIGNIFICANT CHANGE UP (ref 8.4–10.5)
CHLORIDE SERPL-SCNC: 105 MMOL/L — SIGNIFICANT CHANGE UP (ref 98–107)
CO2 SERPL-SCNC: 21 MMOL/L — LOW (ref 22–31)
CREAT SERPL-MCNC: 1.39 MG/DL — HIGH (ref 0.5–1.3)
EOSINOPHIL # BLD AUTO: 0.3 K/UL — SIGNIFICANT CHANGE UP (ref 0–0.5)
EOSINOPHIL NFR BLD AUTO: 3.8 % — SIGNIFICANT CHANGE UP (ref 0–6)
GLUCOSE BLDC GLUCOMTR-MCNC: 135 MG/DL — HIGH (ref 70–99)
GLUCOSE BLDC GLUCOMTR-MCNC: 140 MG/DL — HIGH (ref 70–99)
GLUCOSE BLDC GLUCOMTR-MCNC: 154 MG/DL — HIGH (ref 70–99)
GLUCOSE BLDC GLUCOMTR-MCNC: 186 MG/DL — HIGH (ref 70–99)
GLUCOSE SERPL-MCNC: 165 MG/DL — HIGH (ref 70–99)
HCT VFR BLD CALC: 34.4 % — LOW (ref 34.5–45)
HGB BLD-MCNC: 11 G/DL — LOW (ref 11.5–15.5)
IMM GRANULOCYTES NFR BLD AUTO: 0.4 % — SIGNIFICANT CHANGE UP (ref 0–1.5)
LYMPHOCYTES # BLD AUTO: 4.32 K/UL — HIGH (ref 1–3.3)
LYMPHOCYTES # BLD AUTO: 55.2 % — HIGH (ref 13–44)
MAGNESIUM SERPL-MCNC: 2 MG/DL — SIGNIFICANT CHANGE UP (ref 1.6–2.6)
MCHC RBC-ENTMCNC: 32 % — SIGNIFICANT CHANGE UP (ref 32–36)
MCHC RBC-ENTMCNC: 32.6 PG — SIGNIFICANT CHANGE UP (ref 27–34)
MCV RBC AUTO: 102.1 FL — HIGH (ref 80–100)
MONOCYTES # BLD AUTO: 0.53 K/UL — SIGNIFICANT CHANGE UP (ref 0–0.9)
MONOCYTES NFR BLD AUTO: 6.8 % — SIGNIFICANT CHANGE UP (ref 2–14)
NEUTROPHILS # BLD AUTO: 2.57 K/UL — SIGNIFICANT CHANGE UP (ref 1.8–7.4)
NEUTROPHILS NFR BLD AUTO: 32.9 % — LOW (ref 43–77)
NRBC # FLD: 0 K/UL — SIGNIFICANT CHANGE UP (ref 0–0)
PLATELET # BLD AUTO: 249 K/UL — SIGNIFICANT CHANGE UP (ref 150–400)
PMV BLD: 10.7 FL — SIGNIFICANT CHANGE UP (ref 7–13)
POTASSIUM SERPL-MCNC: 4.2 MMOL/L — SIGNIFICANT CHANGE UP (ref 3.5–5.3)
POTASSIUM SERPL-SCNC: 4.2 MMOL/L — SIGNIFICANT CHANGE UP (ref 3.5–5.3)
PROT UR-MCNC: 5.9 MG/DL — SIGNIFICANT CHANGE UP
RBC # BLD: 3.37 M/UL — LOW (ref 3.8–5.2)
RBC # FLD: 13.2 % — SIGNIFICANT CHANGE UP (ref 10.3–14.5)
SODIUM SERPL-SCNC: 139 MMOL/L — SIGNIFICANT CHANGE UP (ref 135–145)
WBC # BLD: 7.82 K/UL — SIGNIFICANT CHANGE UP (ref 3.8–10.5)
WBC # FLD AUTO: 7.82 K/UL — SIGNIFICANT CHANGE UP (ref 3.8–10.5)

## 2019-07-01 PROCEDURE — 84166 PROTEIN E-PHORESIS/URINE/CSF: CPT | Mod: 26

## 2019-07-01 PROCEDURE — 99233 SBSQ HOSP IP/OBS HIGH 50: CPT

## 2019-07-01 RX ORDER — SODIUM CHLORIDE 9 MG/ML
1000 INJECTION INTRAMUSCULAR; INTRAVENOUS; SUBCUTANEOUS ONCE
Refills: 0 | Status: COMPLETED | OUTPATIENT
Start: 2019-07-01 | End: 2019-07-01

## 2019-07-01 RX ADMIN — Medication 650 MILLIGRAM(S): at 21:26

## 2019-07-01 RX ADMIN — Medication 650 MILLIGRAM(S): at 05:26

## 2019-07-01 RX ADMIN — Medication 3 MILLIGRAM(S): at 21:26

## 2019-07-01 RX ADMIN — CEFTRIAXONE 100 MILLIGRAM(S): 500 INJECTION, POWDER, FOR SOLUTION INTRAMUSCULAR; INTRAVENOUS at 23:35

## 2019-07-01 RX ADMIN — AMLODIPINE BESYLATE 5 MILLIGRAM(S): 2.5 TABLET ORAL at 17:47

## 2019-07-01 RX ADMIN — Medication 81 MILLIGRAM(S): at 11:44

## 2019-07-01 RX ADMIN — SODIUM CHLORIDE 500 MILLILITER(S): 9 INJECTION INTRAMUSCULAR; INTRAVENOUS; SUBCUTANEOUS at 11:45

## 2019-07-01 RX ADMIN — MEMANTINE HYDROCHLORIDE 10 MILLIGRAM(S): 10 TABLET ORAL at 11:44

## 2019-07-01 RX ADMIN — HEPARIN SODIUM 5000 UNIT(S): 5000 INJECTION INTRAVENOUS; SUBCUTANEOUS at 13:16

## 2019-07-01 RX ADMIN — HEPARIN SODIUM 5000 UNIT(S): 5000 INJECTION INTRAVENOUS; SUBCUTANEOUS at 21:26

## 2019-07-01 RX ADMIN — Medication 1: at 17:53

## 2019-07-01 RX ADMIN — CLOPIDOGREL BISULFATE 75 MILLIGRAM(S): 75 TABLET, FILM COATED ORAL at 11:44

## 2019-07-01 RX ADMIN — HEPARIN SODIUM 5000 UNIT(S): 5000 INJECTION INTRAVENOUS; SUBCUTANEOUS at 05:26

## 2019-07-01 RX ADMIN — ATORVASTATIN CALCIUM 10 MILLIGRAM(S): 80 TABLET, FILM COATED ORAL at 21:26

## 2019-07-01 RX ADMIN — NEBIVOLOL HYDROCHLORIDE 2.5 MILLIGRAM(S): 5 TABLET ORAL at 05:26

## 2019-07-01 RX ADMIN — Medication 650 MILLIGRAM(S): at 13:16

## 2019-07-02 ENCOUNTER — TRANSCRIPTION ENCOUNTER (OUTPATIENT)
Age: 84
End: 2019-07-02

## 2019-07-02 VITALS
OXYGEN SATURATION: 100 % | SYSTOLIC BLOOD PRESSURE: 152 MMHG | DIASTOLIC BLOOD PRESSURE: 69 MMHG | RESPIRATION RATE: 18 BRPM | HEART RATE: 72 BPM | TEMPERATURE: 98 F

## 2019-07-02 LAB
ANION GAP SERPL CALC-SCNC: 16 MMO/L — HIGH (ref 7–14)
BUN SERPL-MCNC: 23 MG/DL — SIGNIFICANT CHANGE UP (ref 7–23)
CALCIUM SERPL-MCNC: 9.9 MG/DL — SIGNIFICANT CHANGE UP (ref 8.4–10.5)
CHLORIDE SERPL-SCNC: 105 MMOL/L — SIGNIFICANT CHANGE UP (ref 98–107)
CO2 SERPL-SCNC: 20 MMOL/L — LOW (ref 22–31)
CREAT SERPL-MCNC: 1.12 MG/DL — SIGNIFICANT CHANGE UP (ref 0.5–1.3)
GLUCOSE BLDC GLUCOMTR-MCNC: 177 MG/DL — HIGH (ref 70–99)
GLUCOSE BLDC GLUCOMTR-MCNC: 181 MG/DL — HIGH (ref 70–99)
GLUCOSE SERPL-MCNC: 150 MG/DL — HIGH (ref 70–99)
HCT VFR BLD CALC: 31.4 % — LOW (ref 34.5–45)
HGB BLD-MCNC: 9.9 G/DL — LOW (ref 11.5–15.5)
KAPPA FREE LIGHT CHAINS, SERUM: 4.66 MG/DL — HIGH (ref 0.33–1.94)
LAMBDA FREE LIGHT CHAINS, SERUM: 2.44 MG/DL — SIGNIFICANT CHANGE UP (ref 0.57–2.63)
MCHC RBC-ENTMCNC: 31.5 % — LOW (ref 32–36)
MCHC RBC-ENTMCNC: 31.8 PG — SIGNIFICANT CHANGE UP (ref 27–34)
MCV RBC AUTO: 101 FL — HIGH (ref 80–100)
NRBC # FLD: 0 K/UL — SIGNIFICANT CHANGE UP (ref 0–0)
PLATELET # BLD AUTO: 223 K/UL — SIGNIFICANT CHANGE UP (ref 150–400)
PMV BLD: 10.8 FL — SIGNIFICANT CHANGE UP (ref 7–13)
POTASSIUM SERPL-MCNC: 4.1 MMOL/L — SIGNIFICANT CHANGE UP (ref 3.5–5.3)
POTASSIUM SERPL-SCNC: 4.1 MMOL/L — SIGNIFICANT CHANGE UP (ref 3.5–5.3)
PROT SERPL-MCNC: 7.3 G/DL — SIGNIFICANT CHANGE UP (ref 6–8.3)
RBC # BLD: 3.11 M/UL — LOW (ref 3.8–5.2)
RBC # FLD: 13.2 % — SIGNIFICANT CHANGE UP (ref 10.3–14.5)
SODIUM SERPL-SCNC: 141 MMOL/L — SIGNIFICANT CHANGE UP (ref 135–145)
VIT D25+D1,25 OH+D1,25 PNL SERPL-MCNC: 49.1 PG/ML — SIGNIFICANT CHANGE UP (ref 19.9–79.3)
WBC # BLD: 9.27 K/UL — SIGNIFICANT CHANGE UP (ref 3.8–10.5)
WBC # FLD AUTO: 9.27 K/UL — SIGNIFICANT CHANGE UP (ref 3.8–10.5)

## 2019-07-02 PROCEDURE — 84165 PROTEIN E-PHORESIS SERUM: CPT | Mod: 26

## 2019-07-02 PROCEDURE — 86334 IMMUNOFIX E-PHORESIS SERUM: CPT | Mod: 26

## 2019-07-02 RX ORDER — AMLODIPINE BESYLATE 2.5 MG/1
1 TABLET ORAL
Qty: 0 | Refills: 0 | DISCHARGE

## 2019-07-02 RX ORDER — ACETAMINOPHEN 500 MG
2 TABLET ORAL
Qty: 0 | Refills: 0 | DISCHARGE
Start: 2019-07-02

## 2019-07-02 RX ORDER — AMLODIPINE BESYLATE 2.5 MG/1
1 TABLET ORAL
Qty: 0 | Refills: 0 | DISCHARGE
Start: 2019-07-02

## 2019-07-02 RX ORDER — NEBIVOLOL HYDROCHLORIDE 5 MG/1
1 TABLET ORAL
Qty: 0 | Refills: 0 | DISCHARGE
Start: 2019-07-02

## 2019-07-02 RX ORDER — LANOLIN ALCOHOL/MO/W.PET/CERES
1 CREAM (GRAM) TOPICAL
Qty: 0 | Refills: 0 | DISCHARGE
Start: 2019-07-02

## 2019-07-02 RX ORDER — IBUPROFEN 200 MG
1 TABLET ORAL
Qty: 0 | Refills: 0 | DISCHARGE

## 2019-07-02 RX ORDER — SODIUM BICARBONATE 1 MEQ/ML
1 SYRINGE (ML) INTRAVENOUS
Qty: 0 | Refills: 0 | DISCHARGE
Start: 2019-07-02

## 2019-07-02 RX ADMIN — NEBIVOLOL HYDROCHLORIDE 2.5 MILLIGRAM(S): 5 TABLET ORAL at 05:37

## 2019-07-02 RX ADMIN — HEPARIN SODIUM 5000 UNIT(S): 5000 INJECTION INTRAVENOUS; SUBCUTANEOUS at 05:37

## 2019-07-02 RX ADMIN — HEPARIN SODIUM 5000 UNIT(S): 5000 INJECTION INTRAVENOUS; SUBCUTANEOUS at 13:03

## 2019-07-02 RX ADMIN — Medication 1: at 13:03

## 2019-07-02 RX ADMIN — Medication 81 MILLIGRAM(S): at 11:21

## 2019-07-02 RX ADMIN — Medication 650 MILLIGRAM(S): at 13:03

## 2019-07-02 RX ADMIN — Medication 1: at 09:24

## 2019-07-02 RX ADMIN — Medication 650 MILLIGRAM(S): at 05:37

## 2019-07-02 RX ADMIN — MEMANTINE HYDROCHLORIDE 10 MILLIGRAM(S): 10 TABLET ORAL at 11:20

## 2019-07-02 NOTE — PROGRESS NOTE ADULT - ASSESSMENT
89 year old Creole speaking female, presents with dizziness x 1 week, nephrology consulted for hypercalcemia    Hypercalcemia  PTH elevated, suggested of primary hyperparathyroidism vs familial hypocalciuric hypercalcemia   Can consider 24 hr urine collection for calcium, or can be monitor and work up outpatient   Ca improved with IVF  pending SPEP, SIF, immuno lightchain to r/o multiplemyloma   pending vit d 25, vit d 1,25  Avoid calcium supplements  Monitor serum calcium     CKD stage 3  baseline ~ 1-1.1  IMproved renal function today  likely sec to age/HTN/DM  avoid nephrotoxic agent  monitor renal function     HTN  acceptable  monitor BP    Acidosis  Non-AG  NaHCO3 650mg TID for 3 days  continue to monitor serum Co2

## 2019-07-02 NOTE — PROGRESS NOTE ADULT - REASON FOR ADMISSION
Dizziness x 1 week

## 2019-07-02 NOTE — DISCHARGE NOTE NURSING/CASE MANAGEMENT/SOCIAL WORK - NSDCDPATPORTLINK_GEN_ALL_CORE
You can access the [facility] Portal, offered by [facility], by registering with the following website: [email or web address]

## 2019-07-02 NOTE — PROGRESS NOTE ADULT - ASSESSMENT
Problem/Plan - 1:  ·  Problem: Near syncope.  Plan: telemonitor  cards and neuro fu  MRI reviewed  neuro fu     Problem/Plan - 2:  ·  Problem: Vertigo.  Plan: Meclizine prn  neuro fu     Problem/Plan - 3:  ·  Problem: UTI (urinary tract infection).  Plan: F/U Urine C/S. Continue ceftriaxone.     Problem/Plan - 4:  ·  Problem: Hypercalcemia.  Plan: nephrology fu  ivf  management as per renal    Problem/Plan - 5:  ·  Problem: HTN (hypertension).  Plan: cw current meds     Problem/Plan - 6:  Problem: Dementia. Plan: PT eval.    stable for dc to rehab

## 2019-07-02 NOTE — PROGRESS NOTE ADULT - ASSESSMENT
89 year old Creole speaking female, presents with dizziness x 1 week. Patient is a poor historian, AAOx2 and hard of hearing. Neurology consulted for dizziness. CTA findings shows severe intracranial atherosclerosis in multiple different vascular territories with collateral supply suggestive of Rousseau Rousseau. Patient currently asymptomatic. MRI negative for acute infarct.     Impression: Asymptomatic severe intracranial large vessel atherosclerosis. Ddx possible rousseau rousseau, however would need further MR angio imaging further evaluation. Would not  at this time     Plan:  Recommendations  -start patient on ASA 81mg daily   -c/w secondary stroke prevention w/ risk factor control   -follow up outpatient neurology w/ Jeanette Callahan, [address] [facility], [place] [phone] 1 week following discharge.   -if MR angio needed, can be done outpatient   -no further inpatient neurological workup    -Case discussed w/ stroke attending, Dr. Chaves as well as Medicine Team

## 2019-07-02 NOTE — DISCHARGE NOTE PROVIDER - CARE PROVIDER_API CALL
Jeanette Callahan (NP; RN)  NP in Family Health  [address] [facility], [facility] [address]  [place] [address]  Phone: [phone]  Fax: [phone]  Follow Up Time:

## 2019-07-02 NOTE — PROGRESS NOTE ADULT - PROVIDER SPECIALTY LIST ADULT
Cardiology
Hospitalist
Nephrology
Neurology
Hospitalist
Neurology
Nephrology

## 2019-07-02 NOTE — DISCHARGE NOTE PROVIDER - NSDCCPCAREPLAN_GEN_ALL_CORE_FT
PRINCIPAL DISCHARGE DIAGNOSIS  Diagnosis: Vertigo  Assessment and Plan of Treatment: Continue current medications.  Follow up with Neurology Jeanette Callahan in 1-2 weeks      SECONDARY DISCHARGE DIAGNOSES  Diagnosis: Intracranial vascular stenosis  Assessment and Plan of Treatment: Continue current meds, Follow up with Neurology Jeanette Callahan in 1-2 weeks    Diagnosis: HTN (hypertension)  Assessment and Plan of Treatment: Low sodium and fat diet, continue anti-hypertensive medications, and follow up with primary care physician.    Diagnosis: DM (diabetes mellitus)  Assessment and Plan of Treatment: Monitor finger sticks pre-meal and bedtime, low salt, fat and carbohydrate diet, minimize glucose intake.  Exercise daily for at least 30 minutes and weight loss.  Follow up with primary care physician and endocrinologist for routine Hemoglobin A1C checks and management.  Follow up with your ophthalmologist for routine yearly vision exams. PRINCIPAL DISCHARGE DIAGNOSIS  Diagnosis: Vertigo  Assessment and Plan of Treatment: Continue current medications.  Follow up with Neurology Jeanette Callahan in 1 week      SECONDARY DISCHARGE DIAGNOSES  Diagnosis: Intracranial vascular stenosis  Assessment and Plan of Treatment: Continue current meds, Follow up with Neurology Jeanette Callahan in 1 week.   If MR angiogram is needed, can be done outpatient.      Diagnosis: HTN (hypertension)  Assessment and Plan of Treatment: Low sodium and fat diet, continue anti-hypertensive medications, and follow up with primary care physician.    Diagnosis: DM (diabetes mellitus)  Assessment and Plan of Treatment: Monitor finger sticks pre-meal and bedtime, low salt, fat and carbohydrate diet, minimize glucose intake.  Exercise daily for at least 30 minutes and weight loss.  Follow up with primary care physician and endocrinologist for routine Hemoglobin A1C checks and management.  Follow up with your ophthalmologist for routine yearly vision exams.

## 2019-07-02 NOTE — DISCHARGE NOTE PROVIDER - NSDCCPGOAL_GEN_ALL_CORE_FT
03463 Curry Nash PTA 02025 To get better and follow your care plan as instructed.

## 2019-07-02 NOTE — DISCHARGE NOTE PROVIDER - HOSPITAL COURSE
89 yo F p/w Vertigo & HA x 2 days, CT Head revealed 1.  Atherosclerotic calcification causes short segment moderate stenosis in the proximal supraclinoid segment of the right internal carotid artery. 2.  Tapered narrowing of the distal right internal carotid artery to a     minimum caliber of 1 mm at the carotid terminus.  Short segment severe stenosis versus focal occlusion at the origin of the right middle cerebral artery.  M1 and M2 segments of the right middle cerebral artery are patent but diminished in caliber and demonstrate decreased flow related enhancement compared to the contralateral side.  There are     numerous collateral vessels in the region of the right basal ganglia with a "puff of smoke " appearance, commonly seen in Moyamoya syndrome. Pt found to have a UTI, started on ceftriaxone        +Near syncope/Vertigo - MRI w/o acute infarct    +Severe intracranial stenosis - ?Moyamoya syndrome, house Neuro following, Continue ASA, no need for plavix per Neuro    +UTI - completed CTX    +Hypercalcemia - improved, s/p IVF, Renal following    7/1 Borderline orthostatic and Cr rise to1.39-IVF bolus over 2 hours-will repeat orthostatics 6pm-orthostatics were negative and BMP improved.        Dispo: DC 7/2 to Rehab after Neuro documents final note.  D/W attending. 89 yo F p/w Vertigo & HA x 2 days, CT Head revealed 1.  Atherosclerotic calcification causes short segment moderate stenosis in the proximal supraclinoid segment of the right internal carotid artery. 2.  Tapered narrowing of the distal right internal carotid artery to a     minimum caliber of 1 mm at the carotid terminus.  Short segment severe stenosis versus focal occlusion at the origin of the right middle cerebral artery.  M1 and M2 segments of the right middle cerebral artery are patent but diminished in caliber and demonstrate decreased flow related enhancement compared to the contralateral side.  There are     numerous collateral vessels in the region of the right basal ganglia with a "puff of smoke " appearance, commonly seen in Moyamoya syndrome. Pt found to have a UTI, started on ceftriaxone        +Near syncope/Vertigo - MRI w/o acute infarct    +Severe intracranial stenosis - ?Moyamoya syndrome, house Neuro following, Continue ASA, no need for plavix per Neuro    +UTI - completed CTX    +Hypercalcemia - improved, s/p IVF, Renal following    7/1 Borderline orthostatic and Cr rise to1.39-IVF bolus over 2 hours-will repeat orthostatics 6pm-orthostatics were negative and BMP improved.        Dispo: DC 7/2 to Rehab after Neuro documents final note.  D/W attending.      As per Neuro:     Recommendations    -continue ASA 81mg daily     -c/w secondary stroke prevention w/ risk factor control     -follow up outpatient neurology w/ Jeanette Callahan, [address] [place] [address], [place] [phone] 1 week following discharge.     -if MR angio needed, can be done outpatient     -no further inpatient neurological workup        Pt. may be discharged when we are able to get in touch with her family.

## 2019-07-02 NOTE — DISCHARGE NOTE NURSING/CASE MANAGEMENT/SOCIAL WORK - NSDPFAC_GEN_ALL_CORE
[facility]: [address] [place] [address] (p: [phone])

## 2019-07-02 NOTE — PROGRESS NOTE ADULT - SUBJECTIVE AND OBJECTIVE BOX
Neurology Progress Note      the patient's symptoms  --- are  unchanged    MEDICATIONS  (STANDING):  amLODIPine   Tablet 5 milliGRAM(s) Oral daily  aspirin enteric coated 81 milliGRAM(s) Oral daily  atorvastatin 10 milliGRAM(s) Oral at bedtime  cefTRIAXone   IVPB 1 milliGRAM(s) IV Intermittent every 24 hours  dextrose 5%. 1000 milliLiter(s) (50 mL/Hr) IV Continuous <Continuous>  dextrose 50% Injectable 12.5 Gram(s) IV Push once  dextrose 50% Injectable 25 Gram(s) IV Push once  dextrose 50% Injectable 25 Gram(s) IV Push once  heparin  Injectable 5000 Unit(s) SubCutaneous every 8 hours  insulin lispro (HumaLOG) corrective regimen sliding scale   SubCutaneous three times a day before meals  memantine 10 milliGRAM(s) Oral daily  nebivolol 2.5 milliGRAM(s) Oral daily  sodium chloride 0.9%. 1000 milliLiter(s) (75 mL/Hr) IV Continuous <Continuous>    MEDICATIONS  (PRN):  acetaminophen   Tablet .. 650 milliGRAM(s) Oral every 6 hours PRN Mild Pain (1 - 3), Moderate Pain (4 - 6), Severe Pain (7 - 10)  dextrose 40% Gel 15 Gram(s) Oral once PRN Blood Glucose LESS THAN 70 milliGRAM(s)/deciliter  glucagon  Injectable 1 milliGRAM(s) IntraMuscular once PRN Glucose LESS THAN 70 milligrams/deciliter  meclizine 25 milliGRAM(s) Oral two times a day PRN Dizziness              Vital Signs Last 24 Hrs  T(C): 36.8 (27 Jun 2019 05:26), Max: 36.8 (27 Jun 2019 05:26)  T(F): 98.3 (27 Jun 2019 05:26), Max: 98.3 (27 Jun 2019 05:26)  HR: 73 (27 Jun 2019 05:26) (73 - 80)  BP: 165/73 (27 Jun 2019 05:26) (120/67 - 165/73)  BP(mean): --  RR: 17 (27 Jun 2019 05:26) (16 - 19)  SpO2: 97% (27 Jun 2019 05:26) (97% - 100%)    Physical exam  MENTAL STATUS- Alert, attends, fluent, non-dysarthric, follows commands, orientation- not year ; pres?.  CRANIAL NERVES-II Optic - Bilateral -  Visual Fields- inconsistent. III-IV-VI EOMI & Pupils - Bilateral - Normal Bilaterally. V Trigeminal - Bilateral - Normal bilateral facial sensation and jaw movement. VII Facial - Normal bilateral facial movement. VIII Acoustic - Bilateral - Hearing decto voice bilaterally. IX Glossopharyngeal / X Vagus - Normal palate elevates symmetrically. XI Accessory - Normal Bilaterally. XII Hypoglossal - Tongue protrudes midline and moves symmetrically.  MOTOR-Bulk and Contour - Normal. Tone - Normal tone, no abnormal movements. Strength - 5/5 normal muscle strength - Strength full throughout.  SENSORY-Normal - LT, DSS,    REFLEXES- DTR's 0-1+ throughout.  COORDINATION-Normal FFM, STEPHEN, FNF - bilaterally.  GAIT-NA  CBC Full  -  ( 27 Jun 2019 05:34 )  WBC Count : 8.75 K/uL  RBC Count : 3.33 M/uL  Hemoglobin : 10.9 g/dL  Hematocrit : 35.3 %  Platelet Count - Automated : 249 K/uL  Mean Cell Volume : 106.0 fL  Mean Cell Hemoglobin : 32.7 pg  Mean Cell Hemoglobin Concentration : 30.9 %  Auto Neutrophil # : x  Auto Lymphocyte # : x  Auto Monocyte # : x  Auto Eosinophil # : x  Auto Basophil # : x  Auto Neutrophil % : x  Auto Lymphocyte % : x  Auto Monocyte % : x  Auto Eosinophil % : x  Auto Basophil % : x      06-27    139  |  105  |  23  ----------------------------<  128<H>  4.7   |  13<L>  |  1.11    Ca    9.9      27 Jun 2019 05:48  Phos  4.1     06-27  Mg     1.7     06-27    TPro  8.2  /  Alb  4.5  /  TBili  0.3  /  DBili  x   /  AST  33<H>  /  ALT  15  /  AlkPhos  72  06-26    LIVER FUNCTIONS - ( 26 Jun 2019 06:26 )  Alb: 4.5 g/dL / Pro: 8.2 g/dL / ALK PHOS: 72 u/L / ALT: 15 u/L / AST: 33 u/L / GGT: x                   radiology type ( ~?rad)
[facility] [place] [facility]   MD SIS JACOBS MD RUORU WONG, PA    TEL:  OFFICE: [phone]  DR SZYMANSKI CELL: [phone]  ANGELA GALINDO CELL: [phone]  DR. WOLFE CELL: [phone]    RENAL FOLLOW UP NOTE  --------------------------------------------------------------------------------  HPI:      Pt seen and examined at bedside.       PAST HISTORY  --------------------------------------------------------------------------------  No significant changes to PMH, PSH, FHx, SHx, unless otherwise noted    ALLERGIES & MEDICATIONS  --------------------------------------------------------------------------------  Allergies    No Known Allergies    Intolerances      Standing Inpatient Medications  amLODIPine   Tablet 5 milliGRAM(s) Oral daily  aspirin enteric coated 81 milliGRAM(s) Oral daily  atorvastatin 10 milliGRAM(s) Oral at bedtime  cefTRIAXone   IVPB 1 milliGRAM(s) IV Intermittent every 24 hours  clopidogrel Tablet 75 milliGRAM(s) Oral daily  dextrose 5%. 1000 milliLiter(s) IV Continuous <Continuous>  dextrose 50% Injectable 12.5 Gram(s) IV Push once  dextrose 50% Injectable 25 Gram(s) IV Push once  dextrose 50% Injectable 25 Gram(s) IV Push once  heparin  Injectable 5000 Unit(s) SubCutaneous every 8 hours  insulin lispro (HumaLOG) corrective regimen sliding scale   SubCutaneous three times a day before meals  melatonin 3 milliGRAM(s) Oral at bedtime  memantine 10 milliGRAM(s) Oral daily  nebivolol 2.5 milliGRAM(s) Oral daily  sodium bicarbonate 650 milliGRAM(s) Oral three times a day  sodium chloride 0.9%. 1000 milliLiter(s) IV Continuous <Continuous>    PRN Inpatient Medications  acetaminophen   Tablet .. 650 milliGRAM(s) Oral every 6 hours PRN  dextrose 40% Gel 15 Gram(s) Oral once PRN  glucagon  Injectable 1 milliGRAM(s) IntraMuscular once PRN  meclizine 25 milliGRAM(s) Oral two times a day PRN      REVIEW OF SYSTEMS  --------------------------------------------------------------------------------  General: no fever  CVS: no chest pain  RESP: no sob, no cough  ABD: no abdominal pain      VITALS/PHYSICAL EXAM  --------------------------------------------------------------------------------  T(C): 36.6 (07-01-19 @ 04:52), Max: 36.6 (07-01-19 @ 04:52)  HR: 66 (07-01-19 @ 04:52) (59 - 68)  BP: 142/81 (07-01-19 @ 04:52) (137/72 - 159/82)  RR: 16 (07-01-19 @ 04:52) (16 - 18)  SpO2: 96% (07-01-19 @ 04:52) (96% - 100%)  Wt(kg): --        Physical Exam:  	Gen: NAD  	HEENT: MMM  	Pulm: CTA B/L  	CV: S1S2  	Abd: Soft, +BS  	Ext: No LE edema B/L                      Neuro: Awake   	Skin: Warm and Dry   	 no murcia    LABS/STUDIES  --------------------------------------------------------------------------------              11.0   7.82  >-----------<  249      [07-01-19 @ 04:45]              34.4     139  |  105  |  25  ----------------------------<  165      [07-01-19 @ 04:45]  4.2   |  21  |  1.39        Ca     10.2     [07-01-19 @ 04:45]      Mg     2.0     [07-01-19 @ 04:45]            Creatinine Trend:  SCr 1.39 [07-01 @ 04:45]  SCr 1.15 [06-30 @ 05:10]  SCr 1.13 [06-29 @ 06:06]  SCr 1.18 [06-28 @ 05:47]  SCr 1.11 [06-27 @ 05:48]    Urinalysis - [06-24-19 @ 22:42]      Color LIGHT YELLOW / Appearance Lt TURBID / SG 1.008 / pH 6.0      Gluc NEGATIVE / Ketone NEGATIVE  / Bili NEGATIVE / Urobili NORMAL       Blood NEGATIVE / Protein NEGATIVE / Leuk Est LARGE / Nitrite NEGATIVE      RBC 0-2 / WBC >50 / Hyaline 3+ / Gran  / Sq Epi FEW / Non Sq Epi  / Bacteria NEGATIVE    Urine Protein 11.4      [06-26-19 @ 22:43]    Iron 58, TIBC 290, %sat --      [10-29-18 @ 07:45]  Ferritin 91.15      [10-29-18 @ 07:45]  PTH 67.85 (Ca --)      [06-26-19 @ 06:26]   --  HbA1c 6.0      [06-26-19 @ 06:26]  TSH 3.09      [06-26-19 @ 06:26]  Lipid: chol 179, TG 83, HDL 71, LDL 95      [06-26-19 @ 06:26]
[facility] [place] [facility]ROLOGY [facility]   MD SIS JACOBS MD RUORU WONG, PA    TEL:  OFFICE: [phone]  DR SZYMANSKI CELL: [phone]  ANGELA GALINDO CELL: [phone]  DR. WOLFE CELL: [phone]    RENAL FOLLOW UP NOTE  --------------------------------------------------------------------------------  HPI:      Pt seen and examined at bedside.   Audie SOB, chest pain     PAST HISTORY  --------------------------------------------------------------------------------  No significant changes to PMH, PSH, FHx, SHx, unless otherwise noted    ALLERGIES & MEDICATIONS  --------------------------------------------------------------------------------  Allergies    No Known Allergies    Intolerances      Standing Inpatient Medications  amLODIPine   Tablet 5 milliGRAM(s) Oral daily  aspirin enteric coated 81 milliGRAM(s) Oral daily  atorvastatin 10 milliGRAM(s) Oral at bedtime  cefTRIAXone   IVPB 1 milliGRAM(s) IV Intermittent every 24 hours  clopidogrel Tablet 75 milliGRAM(s) Oral daily  dextrose 5%. 1000 milliLiter(s) IV Continuous <Continuous>  dextrose 50% Injectable 12.5 Gram(s) IV Push once  dextrose 50% Injectable 25 Gram(s) IV Push once  dextrose 50% Injectable 25 Gram(s) IV Push once  heparin  Injectable 5000 Unit(s) SubCutaneous every 8 hours  insulin lispro (HumaLOG) corrective regimen sliding scale   SubCutaneous three times a day before meals  memantine 10 milliGRAM(s) Oral daily  nebivolol 2.5 milliGRAM(s) Oral daily  sodium chloride 0.9%. 1000 milliLiter(s) IV Continuous <Continuous>    PRN Inpatient Medications  acetaminophen   Tablet .. 650 milliGRAM(s) Oral every 6 hours PRN  dextrose 40% Gel 15 Gram(s) Oral once PRN  glucagon  Injectable 1 milliGRAM(s) IntraMuscular once PRN  meclizine 25 milliGRAM(s) Oral two times a day PRN      REVIEW OF SYSTEMS  --------------------------------------------------------------------------------  General: no fever  CVS: no chest pain  RESP: no sob, no cough  ABD: no abdominal pain  : no dysuria,  MSK: no edema     VITALS/PHYSICAL EXAM  --------------------------------------------------------------------------------  T(C): 36.5 (06-29-19 @ 06:21), Max: 36.5 (06-29-19 @ 06:21)  HR: 64 (06-29-19 @ 06:21) (64 - 72)  BP: 142/70 (06-29-19 @ 06:21) (138/70 - 148/75)  RR: 18 (06-29-19 @ 06:21) (16 - 18)  SpO2: 97% (06-29-19 @ 06:21) (97% - 98%)  Wt(kg): --  Height (cm): 152.4 (06-28-19 @ 22:40)      06-28-19 @ 07:01  -  06-29-19 @ 07:00  --------------------------------------------------------  IN: 160 mL / OUT: 500 mL / NET: -340 mL      Physical Exam:  	Gen: NAD  	HEENT: MMM  	Pulm: CTA B/L  	CV: S1S2  	Abd: Soft, +BS  	Ext: No LE edema B/L                      Neuro: Awake   	Skin: Warm and Dry   	Gu no murcia    LABS/STUDIES  --------------------------------------------------------------------------------              9.5    7.44  >-----------<  239      [06-29-19 @ 06:06]              29.6     142  |  109  |  22  ----------------------------<  135      [06-29-19 @ 06:06]  4.1   |  20  |  1.13        Ca     9.5     [06-29-19 @ 06:06]      Mg     1.8     [06-28-19 @ 05:47]            Creatinine Trend:  SCr 1.13 [06-29 @ 06:06]  SCr 1.18 [06-28 @ 05:47]  SCr 1.11 [06-27 @ 05:48]  SCr 1.27 [06-26 @ 06:26]  SCr 1.14 [06-24 @ 21:46]    Urinalysis - [06-24-19 @ 22:42]      Color LIGHT YELLOW / Appearance Lt TURBID / SG 1.008 / pH 6.0      Gluc NEGATIVE / Ketone NEGATIVE  / Bili NEGATIVE / Urobili NORMAL       Blood NEGATIVE / Protein NEGATIVE / Leuk Est LARGE / Nitrite NEGATIVE      RBC 0-2 / WBC >50 / Hyaline 3+ / Gran  / Sq Epi FEW / Non Sq Epi  / Bacteria NEGATIVE    Urine Protein 11.4      [06-26-19 @ 22:43]    Iron 58, TIBC 290, %sat --      [10-29-18 @ 07:45]  Ferritin 91.15      [10-29-18 @ 07:45]  PTH 67.85 (Ca --)      [06-26-19 @ 06:26]   --  HbA1c 6.0      [06-26-19 @ 06:26]  TSH 3.09      [06-26-19 @ 06:26]  Lipid: chol 179, TG 83, HDL 71, LDL 95      [06-26-19 @ 06:26]
Dr. Alex  Office [phone]  Cell [phone]  Xiomy MOORE  Cell [phone]      Patient is a 89y old  Female who presents with a chief complaint of Dizziness x 1 week (30 Jun 2019 09:31)      Patient seen and examined at bedside. No chest pain/sob    VITALS:  T(F): 97.8 (06-30-19 @ 04:52), Max: 98.2 (06-29-19 @ 14:51)  HR: 60 (06-30-19 @ 04:52)  BP: 148/81 (06-30-19 @ 04:52)  RR: 17 (06-30-19 @ 04:52)  SpO2: 100% (06-30-19 @ 04:52)  Wt(kg): --        PHYSICAL EXAM:  Constitutional: NAD  Neck: No JVD  Respiratory: CTAB, no wheezes, rales or rhonchi  Cardiovascular: S1, S2, RRR  Gastrointestinal: BS+, soft, NT/ND  Extremities: No peripheral edema    Hospital Medications:   MEDICATIONS  (STANDING):  amLODIPine   Tablet 5 milliGRAM(s) Oral daily  aspirin enteric coated 81 milliGRAM(s) Oral daily  atorvastatin 10 milliGRAM(s) Oral at bedtime  cefTRIAXone   IVPB 1 milliGRAM(s) IV Intermittent every 24 hours  clopidogrel Tablet 75 milliGRAM(s) Oral daily  dextrose 5%. 1000 milliLiter(s) (50 mL/Hr) IV Continuous <Continuous>  dextrose 50% Injectable 12.5 Gram(s) IV Push once  dextrose 50% Injectable 25 Gram(s) IV Push once  dextrose 50% Injectable 25 Gram(s) IV Push once  heparin  Injectable 5000 Unit(s) SubCutaneous every 8 hours  insulin lispro (HumaLOG) corrective regimen sliding scale   SubCutaneous three times a day before meals  melatonin 3 milliGRAM(s) Oral at bedtime  memantine 10 milliGRAM(s) Oral daily  nebivolol 2.5 milliGRAM(s) Oral daily  sodium chloride 0.9%. 1000 milliLiter(s) (75 mL/Hr) IV Continuous <Continuous>      LABS:  06-30    141  |  109<H>  |  24<H>  ----------------------------<  142<H>  4.2   |  18<L>  |  1.15    Ca    10.2      30 Jun 2019 05:10  Mg     2.0     06-30      Creatinine Trend: 1.15 <--, 1.13 <--, 1.18 <--, 1.11 <--, 1.27 <--, 1.14 <--                                9.6    9.06  )-----------( 242      ( 30 Jun 2019 05:10 )             30.6     Urine Studies:  Urinalysis - [06-24-19 @ 22:42]      Color LIGHT YELLOW / Appearance Lt TURBID / SG 1.008 / pH 6.0      Gluc NEGATIVE / Ketone NEGATIVE  / Bili NEGATIVE / Urobili NORMAL       Blood NEGATIVE / Protein NEGATIVE / Leuk Est LARGE / Nitrite NEGATIVE      RBC 0-2 / WBC >50 / Hyaline 3+ / Gran  / Sq Epi FEW / Non Sq Epi  / Bacteria NEGATIVE    Urine Protein 11.4      [06-26-19 @ 22:43]    Iron 58, TIBC 290, %sat --      [10-29-18 @ 07:45]  Ferritin 91.15      [10-29-18 @ 07:45]  PTH 67.85 (Ca --)      [06-26-19 @ 06:26]   --  HbA1c 6.0      [06-26-19 @ 06:26]  TSH 3.09      [06-26-19 @ 06:26]  Lipid: chol 179, TG 83, HDL 71, LDL 95      [06-26-19 @ 06:26]        RADIOLOGY & ADDITIONAL STUDIES:
Patient is a 89y old  Female who presents with a chief complaint of Dizziness x 1 week (01 Jul 2019 11:45)      INTERVAL HPI/OVERNIGHT EVENTS:  T(C): 36.4 (07-01-19 @ 17:45), Max: 36.6 (07-01-19 @ 04:52)  HR: 67 (07-01-19 @ 17:45) (64 - 68)  BP: 150/74 (07-01-19 @ 17:45) (137/72 - 156/777)  RR: 18 (07-01-19 @ 17:45) (16 - 18)  SpO2: 98% (07-01-19 @ 17:45) (96% - 99%)  Wt(kg): --  I&O's Summary      LABS:                        11.0   7.82  )-----------( 249      ( 01 Jul 2019 04:45 )             34.4     07-01    139  |  105  |  25<H>  ----------------------------<  165<H>  4.2   |  21<L>  |  1.39<H>    Ca    10.2      01 Jul 2019 04:45  Mg     2.0     07-01          CAPILLARY BLOOD GLUCOSE      POCT Blood Glucose.: 154 mg/dL (01 Jul 2019 17:40)  POCT Blood Glucose.: 140 mg/dL (01 Jul 2019 12:53)  POCT Blood Glucose.: 135 mg/dL (01 Jul 2019 08:45)  POCT Blood Glucose.: 206 mg/dL (30 Jun 2019 21:35)            MEDICATIONS  (STANDING):  amLODIPine   Tablet 5 milliGRAM(s) Oral daily  aspirin enteric coated 81 milliGRAM(s) Oral daily  atorvastatin 10 milliGRAM(s) Oral at bedtime  cefTRIAXone   IVPB 1 milliGRAM(s) IV Intermittent every 24 hours  clopidogrel Tablet 75 milliGRAM(s) Oral daily  dextrose 5%. 1000 milliLiter(s) (50 mL/Hr) IV Continuous <Continuous>  dextrose 50% Injectable 12.5 Gram(s) IV Push once  dextrose 50% Injectable 25 Gram(s) IV Push once  dextrose 50% Injectable 25 Gram(s) IV Push once  heparin  Injectable 5000 Unit(s) SubCutaneous every 8 hours  insulin lispro (HumaLOG) corrective regimen sliding scale   SubCutaneous three times a day before meals  melatonin 3 milliGRAM(s) Oral at bedtime  memantine 10 milliGRAM(s) Oral daily  nebivolol 2.5 milliGRAM(s) Oral daily  sodium bicarbonate 650 milliGRAM(s) Oral three times a day  sodium chloride 0.9%. 1000 milliLiter(s) (75 mL/Hr) IV Continuous <Continuous>    MEDICATIONS  (PRN):  acetaminophen   Tablet .. 650 milliGRAM(s) Oral every 6 hours PRN Mild Pain (1 - 3), Moderate Pain (4 - 6), Severe Pain (7 - 10)  dextrose 40% Gel 15 Gram(s) Oral once PRN Blood Glucose LESS THAN 70 milliGRAM(s)/deciliter  glucagon  Injectable 1 milliGRAM(s) IntraMuscular once PRN Glucose LESS THAN 70 milligrams/deciliter  meclizine 25 milliGRAM(s) Oral two times a day PRN Dizziness          PHYSICAL EXAM:  GENERAL: NAD, well-groomed, well-developed  HEAD:  Atraumatic, Normocephalic  CHEST/LUNG: Clear to percussion bilaterally; No rales, rhonchi, wheezing, or rubs  HEART: Regular rate and rhythm; No murmurs, rubs, or gallops  ABDOMEN: Soft, Nontender, Nondistended; Bowel sounds present  EXTREMITIES:  2+ Peripheral Pulses, No clubbing, cyanosis, or edema  LYMPH: No lymphadenopathy noted  SKIN: No rashes or lesions    Care Discussed with Consultants/Other Providers [ ] YES  [ ] NO
Patient is a 89y old  Female who presents with a chief complaint of Dizziness x 1 week (02 Jul 2019 13:11)      INTERVAL HPI/OVERNIGHT EVENTS:  T(C): 36.8 (07-02-19 @ 17:44), Max: 36.8 (07-02-19 @ 17:44)  HR: 72 (07-02-19 @ 17:44) (67 - 81)  BP: 152/69 (07-02-19 @ 17:44) (148/54 - 156/79)  RR: 18 (07-02-19 @ 17:44) (16 - 18)  SpO2: 100% (07-02-19 @ 17:44) (97% - 100%)  Wt(kg): --  I&O's Summary      LABS:                        9.9    9.27  )-----------( 223      ( 02 Jul 2019 05:44 )             31.4     07-02    141  |  105  |  23  ----------------------------<  150<H>  4.1   |  20<L>  |  1.12    Ca    9.9      02 Jul 2019 05:44  Mg     2.0     07-01    TPro  7.3  /  Alb  x   /  TBili  x   /  DBili  x   /  AST  x   /  ALT  x   /  AlkPhos  x   07-02        CAPILLARY BLOOD GLUCOSE      POCT Blood Glucose.: 177 mg/dL (02 Jul 2019 12:11)  POCT Blood Glucose.: 181 mg/dL (02 Jul 2019 08:49)  POCT Blood Glucose.: 186 mg/dL (01 Jul 2019 21:36)            MEDICATIONS  (STANDING):  amLODIPine   Tablet 5 milliGRAM(s) Oral daily  aspirin enteric coated 81 milliGRAM(s) Oral daily  atorvastatin 10 milliGRAM(s) Oral at bedtime  dextrose 5%. 1000 milliLiter(s) (50 mL/Hr) IV Continuous <Continuous>  dextrose 50% Injectable 12.5 Gram(s) IV Push once  dextrose 50% Injectable 25 Gram(s) IV Push once  dextrose 50% Injectable 25 Gram(s) IV Push once  heparin  Injectable 5000 Unit(s) SubCutaneous every 8 hours  insulin lispro (HumaLOG) corrective regimen sliding scale   SubCutaneous three times a day before meals  melatonin 3 milliGRAM(s) Oral at bedtime  memantine 10 milliGRAM(s) Oral daily  nebivolol 2.5 milliGRAM(s) Oral daily  sodium bicarbonate 650 milliGRAM(s) Oral three times a day  sodium chloride 0.9%. 1000 milliLiter(s) (75 mL/Hr) IV Continuous <Continuous>    MEDICATIONS  (PRN):  acetaminophen   Tablet .. 650 milliGRAM(s) Oral every 6 hours PRN Mild Pain (1 - 3), Moderate Pain (4 - 6), Severe Pain (7 - 10)  dextrose 40% Gel 15 Gram(s) Oral once PRN Blood Glucose LESS THAN 70 milliGRAM(s)/deciliter  glucagon  Injectable 1 milliGRAM(s) IntraMuscular once PRN Glucose LESS THAN 70 milligrams/deciliter  meclizine 25 milliGRAM(s) Oral two times a day PRN Dizziness          PHYSICAL EXAM:  GENERAL: NAD, well-groomed, well-developed  HEAD:  Atraumatic, Normocephalic  CHEST/LUNG: Clear to percussion bilaterally; No rales, rhonchi, wheezing, or rubs  HEART: Regular rate and rhythm; No murmurs, rubs, or gallops  ABDOMEN: Soft, Nontender, Nondistended; Bowel sounds present  EXTREMITIES:  2+ Peripheral Pulses, No clubbing, cyanosis, or edema  LYMPH: No lymphadenopathy noted  SKIN: No rashes or lesions    Care Discussed with Consultants/Other Providers [ ] YES  [ ] NO
Patient is a 89y old  Female who presents with a chief complaint of Dizziness x 1 week (2019 14:59)      INTERVAL HPI/OVERNIGHT EVENTS:  T(C): 36.3 (19 @ 12:23), Max: 36.8 (19 @ 23:00)  HR: 76 (19 @ 12:23) (76 - 108)  BP: 129/74 (19 @ 12:23) (129/74 - 189/95)  RR: 16 (19 @ 12:23) (15 - 20)  SpO2: 97% (19 @ 12:23) (95% - 98%)  Wt(kg): --  I&O's Summary    2019 07:01  -  2019 07:00  --------------------------------------------------------  IN: 775 mL / OUT: 650 mL / NET: 125 mL        LABS:                        10.5   9.96  )-----------( 300      ( 2019 06:26 )             32.2         141  |  103  |  21  ----------------------------<  135<H>  3.9   |  21<L>  |  1.27    Ca    10.4      2019 06:26  Phos  3.9       Mg     1.6         TPro  8.2  /  Alb  4.5  /  TBili  0.3  /  DBili  x   /  AST  33<H>  /  ALT  15  /  AlkPhos  72        Urinalysis Basic - ( 2019 22:42 )    Color: LIGHT YELLOW / Appearance: Lt TURBID / S.008 / pH: 6.0  Gluc: NEGATIVE / Ketone: NEGATIVE  / Bili: NEGATIVE / Urobili: NORMAL   Blood: NEGATIVE / Protein: NEGATIVE / Nitrite: NEGATIVE   Leuk Esterase: LARGE / RBC: 0-2 / WBC >50   Sq Epi: FEW / Non Sq Epi: x / Bacteria: NEGATIVE      CAPILLARY BLOOD GLUCOSE      POCT Blood Glucose.: 168 mg/dL (2019 12:30)  POCT Blood Glucose.: 139 mg/dL (2019 09:13)  POCT Blood Glucose.: 176 mg/dL (2019 21:47)  POCT Blood Glucose.: 165 mg/dL (2019 18:11)        Urinalysis Basic - ( 2019 22:42 )    Color: LIGHT YELLOW / Appearance: Lt TURBID / S.008 / pH: 6.0  Gluc: NEGATIVE / Ketone: NEGATIVE  / Bili: NEGATIVE / Urobili: NORMAL   Blood: NEGATIVE / Protein: NEGATIVE / Nitrite: NEGATIVE   Leuk Esterase: LARGE / RBC: 0-2 / WBC >50   Sq Epi: FEW / Non Sq Epi: x / Bacteria: NEGATIVE        MEDICATIONS  (STANDING):  amLODIPine   Tablet 5 milliGRAM(s) Oral daily  aspirin enteric coated 81 milliGRAM(s) Oral daily  atorvastatin 10 milliGRAM(s) Oral at bedtime  cefTRIAXone   IVPB 1 milliGRAM(s) IV Intermittent every 24 hours  dextrose 5%. 1000 milliLiter(s) (50 mL/Hr) IV Continuous <Continuous>  dextrose 50% Injectable 12.5 Gram(s) IV Push once  dextrose 50% Injectable 25 Gram(s) IV Push once  dextrose 50% Injectable 25 Gram(s) IV Push once  heparin  Injectable 5000 Unit(s) SubCutaneous every 8 hours  insulin lispro (HumaLOG) corrective regimen sliding scale   SubCutaneous three times a day before meals  memantine 10 milliGRAM(s) Oral daily  nebivolol 2.5 milliGRAM(s) Oral daily  sodium chloride 0.9%. 1000 milliLiter(s) (75 mL/Hr) IV Continuous <Continuous>    MEDICATIONS  (PRN):  acetaminophen   Tablet .. 650 milliGRAM(s) Oral every 6 hours PRN Mild Pain (1 - 3), Moderate Pain (4 - 6), Severe Pain (7 - 10)  dextrose 40% Gel 15 Gram(s) Oral once PRN Blood Glucose LESS THAN 70 milliGRAM(s)/deciliter  glucagon  Injectable 1 milliGRAM(s) IntraMuscular once PRN Glucose LESS THAN 70 milligrams/deciliter  meclizine 25 milliGRAM(s) Oral two times a day PRN Dizziness          PHYSICAL EXAM:  GENERAL: NAD, well-groomed, well-developed  HEAD:  Atraumatic, Normocephalic  CHEST/LUNG: Clear to percussion bilaterally; No rales, rhonchi, wheezing, or rubs  HEART: Regular rate and rhythm; No murmurs, rubs, or gallops  ABDOMEN: Soft, Nontender, Nondistended; Bowel sounds present  EXTREMITIES:  2+ Peripheral Pulses, No clubbing, cyanosis, or edema  LYMPH: No lymphadenopathy noted  SKIN: No rashes or lesions    Care Discussed with Consultants/Other Providers [ ] YES  [ ] NO
Patient is a 89y old  Female who presents with a chief complaint of Dizziness x 1 week (27 Jun 2019 14:29)      INTERVAL HPI/OVERNIGHT EVENTS:  T(C): 36.7 (06-27-19 @ 17:51), Max: 36.8 (06-27-19 @ 05:26)  HR: 74 (06-27-19 @ 17:51) (73 - 79)  BP: 157/77 (06-27-19 @ 17:51) (120/67 - 165/73)  RR: 18 (06-27-19 @ 17:51) (17 - 19)  SpO2: 100% (06-27-19 @ 17:51) (97% - 100%)  Wt(kg): --  I&O's Summary      LABS:                        10.9   8.75  )-----------( 249      ( 27 Jun 2019 05:34 )             35.3     06-27    139  |  105  |  23  ----------------------------<  128<H>  4.7   |  13<L>  |  1.11    Ca    9.9      27 Jun 2019 05:48  Phos  4.1     06-27  Mg     1.7     06-27    TPro  8.2  /  Alb  4.5  /  TBili  0.3  /  DBili  x   /  AST  33<H>  /  ALT  15  /  AlkPhos  72  06-26        CAPILLARY BLOOD GLUCOSE      POCT Blood Glucose.: 169 mg/dL (27 Jun 2019 17:22)  POCT Blood Glucose.: 146 mg/dL (27 Jun 2019 12:23)  POCT Blood Glucose.: 126 mg/dL (27 Jun 2019 08:45)  POCT Blood Glucose.: 172 mg/dL (26 Jun 2019 21:18)            MEDICATIONS  (STANDING):  amLODIPine   Tablet 5 milliGRAM(s) Oral daily  aspirin enteric coated 81 milliGRAM(s) Oral daily  atorvastatin 10 milliGRAM(s) Oral at bedtime  cefTRIAXone   IVPB 1 milliGRAM(s) IV Intermittent every 24 hours  clopidogrel Tablet 75 milliGRAM(s) Oral daily  dextrose 5%. 1000 milliLiter(s) (50 mL/Hr) IV Continuous <Continuous>  dextrose 50% Injectable 12.5 Gram(s) IV Push once  dextrose 50% Injectable 25 Gram(s) IV Push once  dextrose 50% Injectable 25 Gram(s) IV Push once  heparin  Injectable 5000 Unit(s) SubCutaneous every 8 hours  insulin lispro (HumaLOG) corrective regimen sliding scale   SubCutaneous three times a day before meals  memantine 10 milliGRAM(s) Oral daily  nebivolol 2.5 milliGRAM(s) Oral daily  sodium chloride 0.9%. 1000 milliLiter(s) (75 mL/Hr) IV Continuous <Continuous>    MEDICATIONS  (PRN):  acetaminophen   Tablet .. 650 milliGRAM(s) Oral every 6 hours PRN Mild Pain (1 - 3), Moderate Pain (4 - 6), Severe Pain (7 - 10)  dextrose 40% Gel 15 Gram(s) Oral once PRN Blood Glucose LESS THAN 70 milliGRAM(s)/deciliter  glucagon  Injectable 1 milliGRAM(s) IntraMuscular once PRN Glucose LESS THAN 70 milligrams/deciliter  meclizine 25 milliGRAM(s) Oral two times a day PRN Dizziness          PHYSICAL EXAM:  GENERAL: NAD, well-groomed, well-developed  HEAD:  Atraumatic, Normocephalic  CHEST/LUNG: Clear to percussion bilaterally; No rales, rhonchi, wheezing, or rubs  HEART: Regular rate and rhythm; No murmurs, rubs, or gallops  ABDOMEN: Soft, Nontender, Nondistended; Bowel sounds present  EXTREMITIES:  2+ Peripheral Pulses, No clubbing, cyanosis, or edema  LYMPH: No lymphadenopathy noted  SKIN: No rashes or lesions    Care Discussed with Consultants/Other Providers [ ] YES  [ ] NO
Patient is a 89y old  Female who presents with a chief complaint of Dizziness x 1 week (28 Jun 2019 15:24)      INTERVAL HPI/OVERNIGHT EVENTS:  T(C): 36.4 (06-28-19 @ 15:04), Max: 36.7 (06-27-19 @ 17:51)  HR: 72 (06-28-19 @ 15:04) (65 - 74)  BP: 148/75 (06-28-19 @ 15:04) (131/74 - 157/77)  RR: 16 (06-28-19 @ 15:04) (16 - 18)  SpO2: 98% (06-28-19 @ 15:04) (96% - 100%)  Wt(kg): --  I&O's Summary      LABS:                        9.9    8.11  )-----------( 254      ( 28 Jun 2019 05:47 )             31.0     06-28    141  |  107  |  23  ----------------------------<  134<H>  3.8   |  19<L>  |  1.18    Ca    9.5      28 Jun 2019 05:47  Phos  4.1     06-27  Mg     1.8     06-28          CAPILLARY BLOOD GLUCOSE      POCT Blood Glucose.: 182 mg/dL (28 Jun 2019 13:28)  POCT Blood Glucose.: 208 mg/dL (28 Jun 2019 12:14)  POCT Blood Glucose.: 143 mg/dL (28 Jun 2019 09:03)  POCT Blood Glucose.: 173 mg/dL (27 Jun 2019 21:16)            MEDICATIONS  (STANDING):  amLODIPine   Tablet 5 milliGRAM(s) Oral daily  aspirin enteric coated 81 milliGRAM(s) Oral daily  atorvastatin 10 milliGRAM(s) Oral at bedtime  cefTRIAXone   IVPB 1 milliGRAM(s) IV Intermittent every 24 hours  clopidogrel Tablet 75 milliGRAM(s) Oral daily  dextrose 5%. 1000 milliLiter(s) (50 mL/Hr) IV Continuous <Continuous>  dextrose 50% Injectable 12.5 Gram(s) IV Push once  dextrose 50% Injectable 25 Gram(s) IV Push once  dextrose 50% Injectable 25 Gram(s) IV Push once  heparin  Injectable 5000 Unit(s) SubCutaneous every 8 hours  insulin lispro (HumaLOG) corrective regimen sliding scale   SubCutaneous three times a day before meals  memantine 10 milliGRAM(s) Oral daily  nebivolol 2.5 milliGRAM(s) Oral daily  sodium chloride 0.9%. 1000 milliLiter(s) (75 mL/Hr) IV Continuous <Continuous>    MEDICATIONS  (PRN):  acetaminophen   Tablet .. 650 milliGRAM(s) Oral every 6 hours PRN Mild Pain (1 - 3), Moderate Pain (4 - 6), Severe Pain (7 - 10)  dextrose 40% Gel 15 Gram(s) Oral once PRN Blood Glucose LESS THAN 70 milliGRAM(s)/deciliter  glucagon  Injectable 1 milliGRAM(s) IntraMuscular once PRN Glucose LESS THAN 70 milligrams/deciliter  meclizine 25 milliGRAM(s) Oral two times a day PRN Dizziness          PHYSICAL EXAM:  GENERAL: NAD, well-groomed, well-developed  HEAD:  Atraumatic, Normocephalic  CHEST/LUNG: Clear to percussion bilaterally; No rales, rhonchi, wheezing, or rubs  HEART: Regular rate and rhythm; No murmurs, rubs, or gallops  ABDOMEN: Soft, Nontender, Nondistended; Bowel sounds present  EXTREMITIES:  2+ Peripheral Pulses, No clubbing, cyanosis, or edema  LYMPH: No lymphadenopathy noted  SKIN: No rashes or lesions    Care Discussed with Consultants/Other Providers [ ] YES  [ ] NO
Patient is a 89y old  Female who presents with a chief complaint of Dizziness x 1 week (30 Jun 2019 12:15)      INTERVAL HPI/OVERNIGHT EVENTS:  T(C): 36.3 (06-30-19 @ 15:17), Max: 36.6 (06-29-19 @ 21:36)  HR: 59 (06-30-19 @ 17:42) (59 - 78)  BP: 159/82 (06-30-19 @ 17:42) (146/79 - 159/82)  RR: 18 (06-30-19 @ 15:17) (17 - 18)  SpO2: 100% (06-30-19 @ 15:17) (99% - 100%)  Wt(kg): --  I&O's Summary      LABS:                        9.6    9.06  )-----------( 242      ( 30 Jun 2019 05:10 )             30.6     06-30    141  |  109<H>  |  24<H>  ----------------------------<  142<H>  4.2   |  18<L>  |  1.15    Ca    10.2      30 Jun 2019 05:10  Mg     2.0     06-30          CAPILLARY BLOOD GLUCOSE      POCT Blood Glucose.: 104 mg/dL (30 Jun 2019 17:32)  POCT Blood Glucose.: 182 mg/dL (30 Jun 2019 12:47)  POCT Blood Glucose.: 140 mg/dL (30 Jun 2019 08:36)  POCT Blood Glucose.: 221 mg/dL (29 Jun 2019 21:29)            MEDICATIONS  (STANDING):  amLODIPine   Tablet 5 milliGRAM(s) Oral daily  aspirin enteric coated 81 milliGRAM(s) Oral daily  atorvastatin 10 milliGRAM(s) Oral at bedtime  cefTRIAXone   IVPB 1 milliGRAM(s) IV Intermittent every 24 hours  clopidogrel Tablet 75 milliGRAM(s) Oral daily  dextrose 5%. 1000 milliLiter(s) (50 mL/Hr) IV Continuous <Continuous>  dextrose 50% Injectable 12.5 Gram(s) IV Push once  dextrose 50% Injectable 25 Gram(s) IV Push once  dextrose 50% Injectable 25 Gram(s) IV Push once  heparin  Injectable 5000 Unit(s) SubCutaneous every 8 hours  insulin lispro (HumaLOG) corrective regimen sliding scale   SubCutaneous three times a day before meals  melatonin 3 milliGRAM(s) Oral at bedtime  memantine 10 milliGRAM(s) Oral daily  nebivolol 2.5 milliGRAM(s) Oral daily  sodium bicarbonate 650 milliGRAM(s) Oral three times a day  sodium chloride 0.9%. 1000 milliLiter(s) (75 mL/Hr) IV Continuous <Continuous>    MEDICATIONS  (PRN):  acetaminophen   Tablet .. 650 milliGRAM(s) Oral every 6 hours PRN Mild Pain (1 - 3), Moderate Pain (4 - 6), Severe Pain (7 - 10)  dextrose 40% Gel 15 Gram(s) Oral once PRN Blood Glucose LESS THAN 70 milliGRAM(s)/deciliter  glucagon  Injectable 1 milliGRAM(s) IntraMuscular once PRN Glucose LESS THAN 70 milligrams/deciliter  meclizine 25 milliGRAM(s) Oral two times a day PRN Dizziness          PHYSICAL EXAM:  GENERAL: NAD, well-groomed, well-developed  HEAD:  Atraumatic, Normocephalic  CHEST/LUNG: Clear to percussion bilaterally; No rales, rhonchi, wheezing, or rubs  HEART: Regular rate and rhythm; No murmurs, rubs, or gallops  ABDOMEN: Soft, Nontender, Nondistended; Bowel sounds present  EXTREMITIES:  2+ Peripheral Pulses, No clubbing, cyanosis, or edema  LYMPH: No lymphadenopathy noted  SKIN: No rashes or lesions    Care Discussed with Consultants/Other Providers [ ] YES  [ ] NO
SUBJECTIVE: patient seen w/ attending at bedside.     INTERVAL HISTORY: no reported events overnight     PAST MEDICAL & SURGICAL HISTORY:  Obesity  Anemia  Dementia  OA (osteoarthritis)  Chronic low back pain  Shingles  DM (diabetes mellitus)  HTN (hypertension)  S/P hysterectomy with oophorectomy  Uterine fibroid    FAMILY HISTORY:  No pertinent family history in first degree relatives    SOCIAL HISTORY:   T/E/D:   Occupation:   Lives with:     MEDICATIONS (HOME):  Home Medications:  acetaminophen 325 mg oral tablet: 2 tab(s) orally every 6 hours, As needed, Mild Pain (1 - 3), Moderate Pain (4 - 6), Severe Pain (7 - 10) (02 Jul 2019 10:45)  amLODIPine 5 mg oral tablet: 1 tab(s) orally once a day (02 Jul 2019 10:45)  aspirin 81 mg oral delayed release tablet: 1 tab(s) orally once a day (25 Jun 2019 15:43)  Drisdol 50,000 intl units (1.25 mg) oral capsule: 1 cap(s) orally once a week (25 Jun 2019 15:43)  meclizine 25 mg oral tablet: 1 tab(s) orally 2 times a day (25 Jun 2019 15:43)  melatonin 3 mg oral tablet: 1 tab(s) orally once a day (at bedtime) (02 Jul 2019 10:45)  memantine 10 mg oral tablet: 1 tab(s) orally once a day (25 Jun 2019 15:43)  metFORMIN 1000 mg oral tablet: 1 tab(s) orally 2 times a day (25 Jun 2019 15:43)  Multiple Vitamins with Minerals oral tablet: 1 tab(s) orally once a day (25 Jun 2019 15:43)  nebivolol 2.5 mg oral tablet: 1 tab(s) orally once a day (02 Jul 2019 10:45)  pravastatin 40 mg oral tablet: 1 tab(s) orally once a day  Per pharmacy, 90-day supply last filled in Jan 2019 (25 Jun 2019 15:43)  sodium bicarbonate 650 mg oral tablet: 1 tab(s) orally 3 times a day    STOP AFTER 3 DAYS (02 Jul 2019 10:45)    MEDICATIONS  (STANDING):  amLODIPine   Tablet 5 milliGRAM(s) Oral daily  aspirin enteric coated 81 milliGRAM(s) Oral daily  atorvastatin 10 milliGRAM(s) Oral at bedtime  dextrose 5%. 1000 milliLiter(s) (50 mL/Hr) IV Continuous <Continuous>  dextrose 50% Injectable 12.5 Gram(s) IV Push once  dextrose 50% Injectable 25 Gram(s) IV Push once  dextrose 50% Injectable 25 Gram(s) IV Push once  heparin  Injectable 5000 Unit(s) SubCutaneous every 8 hours  insulin lispro (HumaLOG) corrective regimen sliding scale   SubCutaneous three times a day before meals  melatonin 3 milliGRAM(s) Oral at bedtime  memantine 10 milliGRAM(s) Oral daily  nebivolol 2.5 milliGRAM(s) Oral daily  sodium bicarbonate 650 milliGRAM(s) Oral three times a day  sodium chloride 0.9%. 1000 milliLiter(s) (75 mL/Hr) IV Continuous <Continuous>    MEDICATIONS  (PRN):  acetaminophen   Tablet .. 650 milliGRAM(s) Oral every 6 hours PRN Mild Pain (1 - 3), Moderate Pain (4 - 6), Severe Pain (7 - 10)  dextrose 40% Gel 15 Gram(s) Oral once PRN Blood Glucose LESS THAN 70 milliGRAM(s)/deciliter  glucagon  Injectable 1 milliGRAM(s) IntraMuscular once PRN Glucose LESS THAN 70 milligrams/deciliter  meclizine 25 milliGRAM(s) Oral two times a day PRN Dizziness    ALLERGIES/INTOLERANCES:  Allergies  No Known Allergies    Intolerances    VITALS & EXAMINATION:  Vital Signs Last 24 Hrs  T(C): 36.5 (02 Jul 2019 04:52), Max: 36.7 (01 Jul 2019 20:52)  T(F): 97.7 (02 Jul 2019 04:52), Max: 98 (01 Jul 2019 20:52)  HR: 67 (02 Jul 2019 04:52) (67 - 68)  BP: 148/54 (02 Jul 2019 04:52) (148/54 - 156/777)  BP(mean): --  RR: 16 (02 Jul 2019 04:52) (16 - 18)  SpO2: 98% (02 Jul 2019 04:52) (98% - 99%)    General: Well-developed, well nourished, in no acute distress.    Neurologic:  - Mental Status:  Alert, awake, Creole speaking, mimics gestures   - Cranial Nerves: Face is symmetric with normal eye closure and smile   - Motor:  Strength is 5/5 throughout.  There is no pronator drift.    - Sensory:  Intact throughout to vibration, and joint-position, light touch, pin prick.    LABORATORY:  CBC                       9.9    9.27  )-----------( 223      ( 02 Jul 2019 05:44 )             31.4     Chem 07-02    141  |  105  |  23  ----------------------------<  150<H>  4.1   |  20<L>  |  1.12    Ca    9.9      02 Jul 2019 05:44  Mg     2.0     07-01    TPro  7.3  /  Alb  x   /  TBili  x   /  DBili  x   /  AST  x   /  ALT  x   /  AlkPhos  x   07-02    LFTs LIVER FUNCTIONS - ( 02 Jul 2019 05:44 )  Alb: x     / Pro: 7.3 g/dL / ALK PHOS: x     / ALT: x     / AST: x     / GGT: x           Coagulopathy   Lipid Panel 06-26 Chol 179 LDL 95 HDL 71<H> Trig 83  A1c 06-26 [id number] 6.0    Cardiac enzymes     U/A   CSF  Immunological  Other    STUDIES & IMAGING:  Studies (EKG, EEG, EMG, etc):     Radiology (XR, CT, MR, U/S, TTE/MAGDY):    < from: MR Head No Cont (06.27.19 @ 11:09) >  FINDINGS:    Motion limited study.    Extensive chronic white matter disease. There are no additional areas of   abnormal signal within the brain parenchyma. There is no intraparenchymal   hematoma, mass effect or midline shift. No abnormal extra-axial fluid   collections are present. There is no diffusion abnormality to suggest   acute or subacute infarction. Major flow-voids at the base of the brain   follow expected course and contour.    There is unchanged ventriculomegaly with variable sulcal prominence.   There is crowding of the cerebral vertex. There is an acute callosal   angle at the level of the posterior commissure.    The calvarium is intact. The visualized intraorbital compartments,   paranasal sinuses and tympanomastoid cavities appear free of acute   disease.    IMPRESSION:    No acute territorial infarct or mass.    Chronic white matter disease.    Unchanged imaging findings for which normal pressure hydrocephalus can be   considered. Clinical correlation is required.    < end of copied text >      Radiology:  < from: CT Head No Cont (01.26.19 @ 12:05) >  IMPRESSION:  No intracranial hemorrhage.  No cervical spine fracture.    BRIANNA SHANKS M.D., ATTENDING RADIOLOGIST  This document has been electronically signed. Jan 26 2019 12:30PM  < end of copied text >    < from: CT Angio Head w/ IV Cont (06.25.19 @ 04:12) >  IMPRESSION:  NONCONTRAST HEAD CT SCAN: No CT evidence of acute intracranial   hemorrhage, mass effect or acute territorial infarct.    CT ANGIOGRAPHY NECK: Patent cervical vasculature.  No hemodynamically   significant carotid stenosis or flow-limiting vertebral artery stenosis.    No evidence of vascular dissection in the neck.    CT ANGIOGRAPHY BRAIN:  1.  Atherosclerotic calcification causes short segment moderate stenosis   in the proximal supraclinoid segment of the right internal carotid artery.  2.  Tapered narrowing of the distal right internal carotid artery to a   minimum caliber of 1 mm at the carotid terminus.  Short segment severe   stenosis versus focal occlusion at the origin of the right middle   cerebral artery.  M1 and M2 segments of the right middle cerebral artery   are patent but diminished in caliber and demonstrate decreased flow   related enhancement compared to the contralateral side.  There are   numerous collateral vessels in the region of the right basal ganglia with   a "puff of smoke " appearance, commonly seen in Moyamoya syndrome.      POSTCONTRAST HEAD CT SCAN:   1. No evidence of acute territorial infarct or abnormal enhancement.    2. The dural venous sinuses and cavernous sinuses are patent.   3. Follow-up MRI may be performed as clinically warranted to exclude an   acute infarct.      JC BUCHANAN M.D.,ATTENDING RADIOLOGIST  This document has been electronically signed. Jun 25 2019  4:45AM  < end of copied text >
[facility] [place] [facility]   MD SIS JACOBS MD RUORU WONG, PA    TEL:  OFFICE: [phone]  DR SZYMANSKI CELL: [phone]  ANGELA GALINDO CELL: [phone]  DR. WOLFE CELL: [phone]    RENAL FOLLOW UP NOTE  --------------------------------------------------------------------------------  HPI:      Pt seen and examined at bedside.   Audie SOB, chest pain     PAST HISTORY  --------------------------------------------------------------------------------  No significant changes to PMH, PSH, FHx, SHx, unless otherwise noted    ALLERGIES & MEDICATIONS  --------------------------------------------------------------------------------  Allergies    No Known Allergies    Intolerances      Standing Inpatient Medications  amLODIPine   Tablet 5 milliGRAM(s) Oral daily  aspirin enteric coated 81 milliGRAM(s) Oral daily  atorvastatin 10 milliGRAM(s) Oral at bedtime  dextrose 5%. 1000 milliLiter(s) IV Continuous <Continuous>  dextrose 50% Injectable 12.5 Gram(s) IV Push once  dextrose 50% Injectable 25 Gram(s) IV Push once  dextrose 50% Injectable 25 Gram(s) IV Push once  heparin  Injectable 5000 Unit(s) SubCutaneous every 8 hours  insulin lispro (HumaLOG) corrective regimen sliding scale   SubCutaneous three times a day before meals  melatonin 3 milliGRAM(s) Oral at bedtime  memantine 10 milliGRAM(s) Oral daily  nebivolol 2.5 milliGRAM(s) Oral daily  sodium bicarbonate 650 milliGRAM(s) Oral three times a day  sodium chloride 0.9%. 1000 milliLiter(s) IV Continuous <Continuous>    PRN Inpatient Medications  acetaminophen   Tablet .. 650 milliGRAM(s) Oral every 6 hours PRN  dextrose 40% Gel 15 Gram(s) Oral once PRN  glucagon  Injectable 1 milliGRAM(s) IntraMuscular once PRN  meclizine 25 milliGRAM(s) Oral two times a day PRN      REVIEW OF SYSTEMS  --------------------------------------------------------------------------------  General: no fever  CVS: no chest pain  RESP: no sob, no cough  ABD: no abdominal pain  : no dysuria,  MSK: no edema     VITALS/PHYSICAL EXAM  --------------------------------------------------------------------------------  T(C): 36.5 (07-02-19 @ 04:52), Max: 36.7 (07-01-19 @ 20:52)  HR: 67 (07-02-19 @ 04:52) (67 - 68)  BP: 148/54 (07-02-19 @ 04:52) (148/54 - 156/777)  RR: 16 (07-02-19 @ 04:52) (16 - 18)  SpO2: 98% (07-02-19 @ 04:52) (98% - 99%)  Wt(kg): --        Physical Exam:  	Gen: NAD  	HEENT: MMM  	Pulm: CTA B/L  	CV: S1S2  	Abd: Soft, +BS  	Ext: No LE edema B/L                      Neuro: Awake   	Skin: Warm and Dry   	 no murcia    LABS/STUDIES  --------------------------------------------------------------------------------              9.9    9.27  >-----------<  223      [07-02-19 @ 05:44]              31.4     141  |  105  |  23  ----------------------------<  150      [07-02-19 @ 05:44]  4.1   |  20  |  1.12        Ca     9.9     [07-02-19 @ 05:44]      Mg     2.0     [07-01-19 @ 04:45]    TPro  7.3  /  Alb  x   /  TBili  x   /  DBili  x   /  AST  x   /  ALT  x   /  AlkPhos  x   [07-02-19 @ 05:44]          Creatinine Trend:  SCr 1.12 [07-02 @ 05:44]  SCr 1.39 [07-01 @ 04:45]  SCr 1.15 [06-30 @ 05:10]  SCr 1.13 [06-29 @ 06:06]  SCr 1.18 [06-28 @ 05:47]    Urinalysis - [06-24-19 @ 22:42]      Color LIGHT YELLOW / Appearance Lt TURBID / SG 1.008 / pH 6.0      Gluc NEGATIVE / Ketone NEGATIVE  / Bili NEGATIVE / Urobili NORMAL       Blood NEGATIVE / Protein NEGATIVE / Leuk Est LARGE / Nitrite NEGATIVE      RBC 0-2 / WBC >50 / Hyaline 3+ / Gran  / Sq Epi FEW / Non Sq Epi  / Bacteria NEGATIVE    Urine Protein 5.9      [07-01-19 @ 13:00]    Iron 58, TIBC 290, %sat --      [10-29-18 @ 07:45]  Ferritin 91.15      [10-29-18 @ 07:45]  PTH 67.85 (Ca --)      [06-26-19 @ 06:26]   --  HbA1c 6.0      [06-26-19 @ 06:26]  TSH 3.09      [06-26-19 @ 06:26]  Lipid: chol 179, TG 83, HDL 71, LDL 95      [06-26-19 @ 06:26]
[facility] [place] [facility]   MD SIS JACOBS MD ANGELA WONG, PA    TEL:  OFFICE: [phone]  DR SZYMANSKI CELL: [phone]  DR. WOLFE CELL: [phone]  JESSICA GALINDO CELL: [phone]        Patient is a 89y old  Female who presents with a chief complaint of Dizziness x 1 week (25 Jun 2019 23:07)      Patient seen and examined at bedside. still dizziness     VITALS:  T(F): 97.8 (06-26-19 @ 06:33), Max: 98.5 (06-25-19 @ 13:11)  HR: 100 (06-26-19 @ 06:33)  BP: 157/92 (06-26-19 @ 06:33)  RR: 15 (06-26-19 @ 06:33)  SpO2: 95% (06-26-19 @ 06:33)  Wt(kg): --    06-25 @ 07:01  -  06-26 @ 07:00  --------------------------------------------------------  IN: 775 mL / OUT: 650 mL / NET: 125 mL          PHYSICAL EXAM:  Constitutional: NAD  Neck: No JVD  Respiratory: CTAB, no wheezes, rales or rhonchi  Cardiovascular: S1, S2, RRR  Gastrointestinal: BS+, soft, NT/ND  Extremities: No peripheral edema    Hospital Medications:   MEDICATIONS  (STANDING):  amLODIPine   Tablet 5 milliGRAM(s) Oral daily  aspirin enteric coated 81 milliGRAM(s) Oral daily  atorvastatin 10 milliGRAM(s) Oral at bedtime  cefTRIAXone   IVPB 1 milliGRAM(s) IV Intermittent every 24 hours  dextrose 5%. 1000 milliLiter(s) (50 mL/Hr) IV Continuous <Continuous>  dextrose 50% Injectable 12.5 Gram(s) IV Push once  dextrose 50% Injectable 25 Gram(s) IV Push once  dextrose 50% Injectable 25 Gram(s) IV Push once  heparin  Injectable 5000 Unit(s) SubCutaneous every 8 hours  insulin lispro (HumaLOG) corrective regimen sliding scale   SubCutaneous three times a day before meals  memantine 10 milliGRAM(s) Oral daily  nebivolol 2.5 milliGRAM(s) Oral daily  sodium chloride 0.9%. 1000 milliLiter(s) (75 mL/Hr) IV Continuous <Continuous>      LABS:  06-26    141  |  103  |  21  ----------------------------<  135<H>  3.9   |  21<L>  |  1.27    Ca    10.4      26 Jun 2019 06:26  Phos  3.9     06-26  Mg     1.6     06-26    TPro  8.2  /  Alb  4.5  /  TBili  0.3  /  DBili      /  AST  33<H>  /  ALT  15  /  AlkPhos  72  06-26    Creatinine Trend: 1.27 <--, 1.14 <--    Phosphorus Level, Serum: 3.9 mg/dL (06-26 @ 06:26)  Albumin, Serum: 4.5 g/dL (06-26 @ 06:26)    calcium--  intact pth--  parathyroid hormone intact, serum67.85                            10.5   9.96  )-----------( 300      ( 26 Jun 2019 06:26 )             32.2     Urine Studies:  Urinalysis - [06-24-19 @ 22:42]      Color LIGHT YELLOW / Appearance Lt TURBID / SG 1.008 / pH 6.0      Gluc NEGATIVE / Ketone NEGATIVE  / Bili NEGATIVE / Urobili NORMAL       Blood NEGATIVE / Protein NEGATIVE / Leuk Est LARGE / Nitrite NEGATIVE      RBC 0-2 / WBC >50 / Hyaline 3+ / Gran  / Sq Epi FEW / Non Sq Epi  / Bacteria NEGATIVE      Iron 58, TIBC 290, %sat --      [10-29-18 @ 07:45]  Ferritin 91.15      [10-29-18 @ 07:45]  PTH 67.85 (Ca --)      [06-26-19 @ 06:26]   --  HbA1c 6.0      [06-26-19 @ 06:26]  TSH 3.09      [06-26-19 @ 06:26]  Lipid: chol 179, TG 83, HDL 71, LDL 95      [06-26-19 @ 06:26]        RADIOLOGY & ADDITIONAL STUDIES:
[facility] [place] [facility]   MD SIS JACOBS MD ANGELA WONG, PA    TEL:  OFFICE: [phone]  DR SZYMANSKI CELL: [phone]  DR. WOLFE CELL: [phone]  JESSICA GALINDO CELL: [phone]        Patient is a 89y old  Female who presents with a chief complaint of Dizziness x 1 week (27 Jun 2019 12:42)      Patient seen and examined at bedside. No chest pain/sob    VITALS:  T(F): 98.3 (06-27-19 @ 05:26), Max: 98.3 (06-27-19 @ 05:26)  HR: 73 (06-27-19 @ 05:26)  BP: 165/73 (06-27-19 @ 05:26)  RR: 17 (06-27-19 @ 05:26)  SpO2: 97% (06-27-19 @ 05:26)  Wt(kg): --      Weight (kg): 79.5 (06-26 @ 22:17)    PHYSICAL EXAM:  Constitutional: NAD  Neck: No JVD  Respiratory: CTAB, no wheezes, rales or rhonchi  Cardiovascular: S1, S2, RRR  Gastrointestinal: BS+, soft, NT/ND  Extremities: No peripheral edema    Hospital Medications:   MEDICATIONS  (STANDING):  amLODIPine   Tablet 5 milliGRAM(s) Oral daily  aspirin enteric coated 81 milliGRAM(s) Oral daily  atorvastatin 10 milliGRAM(s) Oral at bedtime  cefTRIAXone   IVPB 1 milliGRAM(s) IV Intermittent every 24 hours  clopidogrel Tablet 75 milliGRAM(s) Oral daily  dextrose 5%. 1000 milliLiter(s) (50 mL/Hr) IV Continuous <Continuous>  dextrose 50% Injectable 12.5 Gram(s) IV Push once  dextrose 50% Injectable 25 Gram(s) IV Push once  dextrose 50% Injectable 25 Gram(s) IV Push once  heparin  Injectable 5000 Unit(s) SubCutaneous every 8 hours  insulin lispro (HumaLOG) corrective regimen sliding scale   SubCutaneous three times a day before meals  memantine 10 milliGRAM(s) Oral daily  nebivolol 2.5 milliGRAM(s) Oral daily  sodium chloride 0.9%. 1000 milliLiter(s) (75 mL/Hr) IV Continuous <Continuous>      LABS:  06-27    139  |  105  |  23  ----------------------------<  128<H>  4.7   |  13<L>  |  1.11    Ca    9.9      27 Jun 2019 05:48  Phos  4.1     06-27  Mg     1.7     06-27    TPro  8.2  /  Alb  4.5  /  TBili  0.3  /  DBili      /  AST  33<H>  /  ALT  15  /  AlkPhos  72  06-26    Creatinine Trend: 1.11 <--, 1.27 <--, 1.14 <--    Phosphorus Level, Serum: 4.1 mg/dL (06-27 @ 05:48)                              10.9   8.75  )-----------( 249      ( 27 Jun 2019 05:34 )             35.3     Urine Studies:  Urinalysis - [06-24-19 @ 22:42]      Color LIGHT YELLOW / Appearance Lt TURBID / SG 1.008 / pH 6.0      Gluc NEGATIVE / Ketone NEGATIVE  / Bili NEGATIVE / Urobili NORMAL       Blood NEGATIVE / Protein NEGATIVE / Leuk Est LARGE / Nitrite NEGATIVE      RBC 0-2 / WBC >50 / Hyaline 3+ / Gran  / Sq Epi FEW / Non Sq Epi  / Bacteria NEGATIVE    Urine Protein 11.4      [06-26-19 @ 22:43]    Iron 58, TIBC 290, %sat --      [10-29-18 @ 07:45]  Ferritin 91.15      [10-29-18 @ 07:45]  PTH 67.85 (Ca --)      [06-26-19 @ 06:26]   --  HbA1c 6.0      [06-26-19 @ 06:26]  TSH 3.09      [06-26-19 @ 06:26]  Lipid: chol 179, TG 83, HDL 71, LDL 95      [06-26-19 @ 06:26]        RADIOLOGY & ADDITIONAL STUDIES:
Subjective: Patient seen and examined. No new events except as noted.     SUBJECTIVE/ROS:        MEDICATIONS:  MEDICATIONS  (STANDING):  amLODIPine   Tablet 5 milliGRAM(s) Oral daily  aspirin enteric coated 81 milliGRAM(s) Oral daily  atorvastatin 10 milliGRAM(s) Oral at bedtime  cefTRIAXone   IVPB 1 milliGRAM(s) IV Intermittent every 24 hours  clopidogrel Tablet 75 milliGRAM(s) Oral daily  dextrose 5%. 1000 milliLiter(s) (50 mL/Hr) IV Continuous <Continuous>  dextrose 50% Injectable 12.5 Gram(s) IV Push once  dextrose 50% Injectable 25 Gram(s) IV Push once  dextrose 50% Injectable 25 Gram(s) IV Push once  heparin  Injectable 5000 Unit(s) SubCutaneous every 8 hours  insulin lispro (HumaLOG) corrective regimen sliding scale   SubCutaneous three times a day before meals  melatonin 3 milliGRAM(s) Oral at bedtime  memantine 10 milliGRAM(s) Oral daily  nebivolol 2.5 milliGRAM(s) Oral daily  sodium bicarbonate 650 milliGRAM(s) Oral three times a day  sodium chloride 0.9% Bolus 1000 milliLiter(s) IV Bolus once  sodium chloride 0.9%. 1000 milliLiter(s) (75 mL/Hr) IV Continuous <Continuous>      PHYSICAL EXAM:  T(C): 36.6 (07-01-19 @ 04:52), Max: 36.6 (07-01-19 @ 04:52)  HR: 66 (07-01-19 @ 04:52) (59 - 68)  BP: 142/81 (07-01-19 @ 04:52) (137/72 - 159/82)  RR: 16 (07-01-19 @ 04:52) (16 - 18)  SpO2: 96% (07-01-19 @ 04:52) (96% - 100%)  Wt(kg): --  I&O's Summary          JVP: Normal  Neck: supple  Lung: clear   CV: S1 S2 , Murmur:  Abd: soft  Ext: No edema  neuro: Awake / alert  Psych: flat affect  Skin: normal``    LABS/DATA:    CARDIAC MARKERS:                                11.0   7.82  )-----------( 249      ( 01 Jul 2019 04:45 )             34.4     07-01    139  |  105  |  25<H>  ----------------------------<  165<H>  4.2   |  21<L>  |  1.39<H>    Ca    10.2      01 Jul 2019 04:45  Mg     2.0     07-01      proBNP:   Lipid Profile:   HgA1c:   TSH:     TELE:  EKG:
Subjective: Patient seen and examined. No new events except as noted.     SUBJECTIVE/ROS:        MEDICATIONS:  MEDICATIONS  (STANDING):  amLODIPine   Tablet 5 milliGRAM(s) Oral daily  aspirin enteric coated 81 milliGRAM(s) Oral daily  atorvastatin 10 milliGRAM(s) Oral at bedtime  cefTRIAXone   IVPB 1 milliGRAM(s) IV Intermittent every 24 hours  clopidogrel Tablet 75 milliGRAM(s) Oral daily  dextrose 5%. 1000 milliLiter(s) (50 mL/Hr) IV Continuous <Continuous>  dextrose 50% Injectable 12.5 Gram(s) IV Push once  dextrose 50% Injectable 25 Gram(s) IV Push once  dextrose 50% Injectable 25 Gram(s) IV Push once  heparin  Injectable 5000 Unit(s) SubCutaneous every 8 hours  insulin lispro (HumaLOG) corrective regimen sliding scale   SubCutaneous three times a day before meals  memantine 10 milliGRAM(s) Oral daily  nebivolol 2.5 milliGRAM(s) Oral daily  sodium chloride 0.9%. 1000 milliLiter(s) (75 mL/Hr) IV Continuous <Continuous>      PHYSICAL EXAM:  T(C): 36.5 (06-29-19 @ 06:21), Max: 36.5 (06-29-19 @ 06:21)  HR: 64 (06-29-19 @ 06:21) (64 - 72)  BP: 142/70 (06-29-19 @ 06:21) (138/70 - 148/75)  RR: 18 (06-29-19 @ 06:21) (16 - 18)  SpO2: 97% (06-29-19 @ 06:21) (97% - 98%)  Wt(kg): --  I&O's Summary    28 Jun 2019 07:01  -  29 Jun 2019 07:00  --------------------------------------------------------  IN: 100 mL / OUT: 300 mL / NET: -200 mL      Height (cm): 152.4 (06-28 @ 22:40)      JVP: Normal  Neck: supple  Lung: clear   CV: S1 S2 , Murmur:  Abd: soft  Ext: No edema  neuro: Awake   Psych: flat affect  Skin: normal``    LABS/DATA:    CARDIAC MARKERS:                                9.5    7.44  )-----------( 239      ( 29 Jun 2019 06:06 )             29.6     06-29    142  |  109<H>  |  22  ----------------------------<  135<H>  4.1   |  20<L>  |  1.13    Ca    9.5      29 Jun 2019 06:06  Mg     1.8     06-28      proBNP:   Lipid Profile:   HgA1c:   TSH:     TELE:  EKG:
Subjective: Patient seen and examined. No new events except as noted.     SUBJECTIVE/ROS:        MEDICATIONS:  MEDICATIONS  (STANDING):  amLODIPine   Tablet 5 milliGRAM(s) Oral daily  aspirin enteric coated 81 milliGRAM(s) Oral daily  atorvastatin 10 milliGRAM(s) Oral at bedtime  cefTRIAXone   IVPB 1 milliGRAM(s) IV Intermittent every 24 hours  clopidogrel Tablet 75 milliGRAM(s) Oral daily  dextrose 5%. 1000 milliLiter(s) (50 mL/Hr) IV Continuous <Continuous>  dextrose 50% Injectable 12.5 Gram(s) IV Push once  dextrose 50% Injectable 25 Gram(s) IV Push once  dextrose 50% Injectable 25 Gram(s) IV Push once  heparin  Injectable 5000 Unit(s) SubCutaneous every 8 hours  insulin lispro (HumaLOG) corrective regimen sliding scale   SubCutaneous three times a day before meals  memantine 10 milliGRAM(s) Oral daily  nebivolol 2.5 milliGRAM(s) Oral daily  sodium chloride 0.9%. 1000 milliLiter(s) (75 mL/Hr) IV Continuous <Continuous>      PHYSICAL EXAM:  T(C): 36.6 (06-28-19 @ 05:25), Max: 36.7 (06-27-19 @ 17:51)  HR: 65 (06-28-19 @ 05:25) (65 - 74)  BP: 146/64 (06-28-19 @ 05:25) (131/74 - 157/77)  RR: 17 (06-28-19 @ 05:25) (17 - 18)  SpO2: 96% (06-28-19 @ 05:25) (96% - 100%)  Wt(kg): --  I&O's Summary          JVP: Normal  Neck: supple  Lung: clear   CV: S1 S2 , Murmur:  Abd: soft  Ext: No edema  neuro: Awake / alert  Psych: flat affect  Skin: normal``    LABS/DATA:    CARDIAC MARKERS:                                9.9    8.11  )-----------( 254      ( 28 Jun 2019 05:47 )             31.0     06-28    141  |  107  |  23  ----------------------------<  134<H>  3.8   |  19<L>  |  1.18    Ca    9.5      28 Jun 2019 05:47  Phos  4.1     06-27  Mg     1.8     06-28      proBNP:   Lipid Profile:   HgA1c:   TSH:     TELE:  EKG:      < from: Transthoracic Echocardiogram (06.27.19 @ 17:49) >  CONCLUSIONS:  1. Mitral annular calcification, otherwise normal mitral  valve. Minimal mitral regurgitation.  2. Aortic valve leaflet morphology not well visualized.  Mild aortic regurgitation.  3. Endocardium not well visualized; grossly normal left  ventricular systolic function.  4. The right ventricle is not well visualized; grossly  normal right ventricular systolic function.  ------------------------------------------------------------------------  Confirmed on  6/28/2019 - 07:41:49 by Rafael Beck M.D.  ------------------------------------------------------------------------    < end of copied text >
Subjective: Patient seen and examined. No new events except as noted.     SUBJECTIVE/ROS:        MEDICATIONS:  MEDICATIONS  (STANDING):  amLODIPine   Tablet 5 milliGRAM(s) Oral daily  aspirin enteric coated 81 milliGRAM(s) Oral daily  atorvastatin 10 milliGRAM(s) Oral at bedtime  cefTRIAXone   IVPB 1 milliGRAM(s) IV Intermittent every 24 hours  dextrose 5%. 1000 milliLiter(s) (50 mL/Hr) IV Continuous <Continuous>  dextrose 50% Injectable 12.5 Gram(s) IV Push once  dextrose 50% Injectable 25 Gram(s) IV Push once  dextrose 50% Injectable 25 Gram(s) IV Push once  heparin  Injectable 5000 Unit(s) SubCutaneous every 8 hours  insulin lispro (HumaLOG) corrective regimen sliding scale   SubCutaneous three times a day before meals  memantine 10 milliGRAM(s) Oral daily  nebivolol 2.5 milliGRAM(s) Oral daily  sodium chloride 0.9%. 1000 milliLiter(s) (75 mL/Hr) IV Continuous <Continuous>      PHYSICAL EXAM:  T(C): 36.3 (06-26-19 @ 12:23), Max: 36.8 (06-25-19 @ 23:00)  HR: 76 (06-26-19 @ 12:23) (76 - 108)  BP: 129/74 (06-26-19 @ 12:23) (129/74 - 189/95)  RR: 16 (06-26-19 @ 12:23) (15 - 20)  SpO2: 97% (06-26-19 @ 12:23) (95% - 98%)  Wt(kg): --  I&O's Summary    25 Jun 2019 07:01  -  26 Jun 2019 07:00  --------------------------------------------------------  IN: 775 mL / OUT: 650 mL / NET: 125 mL            JVP: Normal  Neck: supple  Lung: clear   CV: S1 S2 , Murmur:  Abd: soft  Ext: No edema  neuro: Awake / alert  Psych: flat affect  Skin: normal``    LABS/DATA:    CARDIAC MARKERS:  CARDIAC MARKERS ( 24 Jun 2019 21:46 )  x     / x     / 114 u/L / x     / x                                    10.5   9.96  )-----------( 300      ( 26 Jun 2019 06:26 )             32.2     06-26    141  |  103  |  21  ----------------------------<  135<H>  3.9   |  21<L>  |  1.27    Ca    10.4      26 Jun 2019 06:26  Phos  3.9     06-26  Mg     1.6     06-26    TPro  8.2  /  Alb  4.5  /  TBili  0.3  /  DBili  x   /  AST  33<H>  /  ALT  15  /  AlkPhos  72  06-26    proBNP:   Lipid Profile:   HgA1c: Hemoglobin A1C, Whole Blood: 6.0 % (06-26 @ 06:26)    TSH: Thyroid Stimulating Hormone, Serum: 3.09 uIU/mL (06-26 @ 06:26)      TELE:  EKG:
Subjective: Patient seen and examined. No new events except as noted.     SUBJECTIVE/ROS:        MEDICATIONS:  MEDICATIONS  (STANDING):  amLODIPine   Tablet 5 milliGRAM(s) Oral daily  aspirin enteric coated 81 milliGRAM(s) Oral daily  atorvastatin 10 milliGRAM(s) Oral at bedtime  dextrose 5%. 1000 milliLiter(s) (50 mL/Hr) IV Continuous <Continuous>  dextrose 50% Injectable 12.5 Gram(s) IV Push once  dextrose 50% Injectable 25 Gram(s) IV Push once  dextrose 50% Injectable 25 Gram(s) IV Push once  heparin  Injectable 5000 Unit(s) SubCutaneous every 8 hours  insulin lispro (HumaLOG) corrective regimen sliding scale   SubCutaneous three times a day before meals  melatonin 3 milliGRAM(s) Oral at bedtime  memantine 10 milliGRAM(s) Oral daily  nebivolol 2.5 milliGRAM(s) Oral daily  sodium bicarbonate 650 milliGRAM(s) Oral three times a day  sodium chloride 0.9%. 1000 milliLiter(s) (75 mL/Hr) IV Continuous <Continuous>      PHYSICAL EXAM:  T(C): 36.8 (07-02-19 @ 17:44), Max: 36.8 (07-02-19 @ 17:44)  HR: 72 (07-02-19 @ 17:44) (67 - 81)  BP: 152/69 (07-02-19 @ 17:44) (148/54 - 156/79)  RR: 18 (07-02-19 @ 17:44) (16 - 18)  SpO2: 100% (07-02-19 @ 17:44) (97% - 100%)  Wt(kg): --  I&O's Summary          JVP: Normal  Neck: supple  Lung: clear   CV: S1 S2 , Murmur:  Abd: soft  Ext: No edema  neuro: Awake   Psych: flat affect  Skin: normal``    LABS/DATA:    CARDIAC MARKERS:                                9.9    9.27  )-----------( 223      ( 02 Jul 2019 05:44 )             31.4     07-02    141  |  105  |  23  ----------------------------<  150<H>  4.1   |  20<L>  |  1.12    Ca    9.9      02 Jul 2019 05:44  Mg     2.0     07-01    TPro  7.3  /  Alb  x   /  TBili  x   /  DBili  x   /  AST  x   /  ALT  x   /  AlkPhos  x   07-02    proBNP:   Lipid Profile:   HgA1c:   TSH:     TELE:  EKG:
Subjective: Patient seen and examined. No new events except as noted.     SUBJECTIVE/ROS:  ROS limited       MEDICATIONS:  MEDICATIONS  (STANDING):  amLODIPine   Tablet 5 milliGRAM(s) Oral daily  aspirin enteric coated 81 milliGRAM(s) Oral daily  atorvastatin 10 milliGRAM(s) Oral at bedtime  cefTRIAXone   IVPB 1 milliGRAM(s) IV Intermittent every 24 hours  clopidogrel Tablet 75 milliGRAM(s) Oral daily  dextrose 5%. 1000 milliLiter(s) (50 mL/Hr) IV Continuous <Continuous>  dextrose 50% Injectable 12.5 Gram(s) IV Push once  dextrose 50% Injectable 25 Gram(s) IV Push once  dextrose 50% Injectable 25 Gram(s) IV Push once  heparin  Injectable 5000 Unit(s) SubCutaneous every 8 hours  insulin lispro (HumaLOG) corrective regimen sliding scale   SubCutaneous three times a day before meals  melatonin 3 milliGRAM(s) Oral at bedtime  memantine 10 milliGRAM(s) Oral daily  nebivolol 2.5 milliGRAM(s) Oral daily  sodium chloride 0.9%. 1000 milliLiter(s) (75 mL/Hr) IV Continuous <Continuous>      PHYSICAL EXAM:  T(C): 36.6 (06-30-19 @ 04:52), Max: 36.8 (06-29-19 @ 14:51)  HR: 60 (06-30-19 @ 04:52) (60 - 78)  BP: 148/81 (06-30-19 @ 04:52) (135/67 - 155/87)  RR: 17 (06-30-19 @ 04:52) (17 - 18)  SpO2: 100% (06-30-19 @ 04:52) (99% - 100%)  Wt(kg): --  I&O's Summary          JVP: Normal  Neck: supple  Lung: clear   CV: S1 S2 , Murmur:  Abd: soft  Ext: No edema  neuro: Awake / alert  Psych: flat affect  Skin: normal``    LABS/DATA:    CARDIAC MARKERS:                                9.6    9.06  )-----------( 242      ( 30 Jun 2019 05:10 )             30.6     06-30    141  |  109<H>  |  24<H>  ----------------------------<  142<H>  4.2   |  18<L>  |  1.15    Ca    10.2      30 Jun 2019 05:10  Mg     2.0     06-30      proBNP:   Lipid Profile:   HgA1c:   TSH:     TELE:  EKG:
Subjective: Patient seen and examined. No new events except as noted.     SUBJECTIVE/ROS:  ROS limited       MEDICATIONS:  MEDICATIONS  (STANDING):  amLODIPine   Tablet 5 milliGRAM(s) Oral daily  aspirin enteric coated 81 milliGRAM(s) Oral daily  atorvastatin 10 milliGRAM(s) Oral at bedtime  cefTRIAXone   IVPB 1 milliGRAM(s) IV Intermittent every 24 hours  dextrose 5%. 1000 milliLiter(s) (50 mL/Hr) IV Continuous <Continuous>  dextrose 50% Injectable 12.5 Gram(s) IV Push once  dextrose 50% Injectable 25 Gram(s) IV Push once  dextrose 50% Injectable 25 Gram(s) IV Push once  heparin  Injectable 5000 Unit(s) SubCutaneous every 8 hours  insulin lispro (HumaLOG) corrective regimen sliding scale   SubCutaneous three times a day before meals  memantine 10 milliGRAM(s) Oral daily  nebivolol 2.5 milliGRAM(s) Oral daily  sodium chloride 0.9%. 1000 milliLiter(s) (75 mL/Hr) IV Continuous <Continuous>      PHYSICAL EXAM:  T(C): 36.8 (06-27-19 @ 05:26), Max: 36.8 (06-27-19 @ 05:26)  HR: 73 (06-27-19 @ 05:26) (73 - 80)  BP: 165/73 (06-27-19 @ 05:26) (120/67 - 165/73)  RR: 17 (06-27-19 @ 05:26) (17 - 19)  SpO2: 97% (06-27-19 @ 05:26) (97% - 100%)  Wt(kg): --  I&O's Summary      Weight (kg): 79.5 (06-26 @ 22:17)      JVP: Normal  Neck: supple  Lung: clear   CV: S1 S2 , Murmur:  Abd: soft  Ext: No edema  neuro: Awake  Psych: flat affect  Skin: normal``    LABS/DATA:    CARDIAC MARKERS:  CARDIAC MARKERS ( 24 Jun 2019 21:46 )  x     / x     / 114 u/L / x     / x                                    10.9   8.75  )-----------( 249      ( 27 Jun 2019 05:34 )             35.3     06-27    139  |  105  |  23  ----------------------------<  128<H>  4.7   |  13<L>  |  1.11    Ca    9.9      27 Jun 2019 05:48  Phos  4.1     06-27  Mg     1.7     06-27    TPro  8.2  /  Alb  4.5  /  TBili  0.3  /  DBili  x   /  AST  33<H>  /  ALT  15  /  AlkPhos  72  06-26    proBNP:   Lipid Profile:   HgA1c:   TSH:     TELE:  EKG:      < from: MR Head No Cont (06.27.19 @ 11:09) >  IMPRESSION:    No acute territorial infarct or mass.    Chronic white matter disease.    Unchanged imaging findings for which normal pressure hydrocephalus can be   considered. Clinical correlation is required.    < end of copied text >
[facility] [place] [facility]   MD SIS JACOBS MD ANGELA WONG, PA    TEL:  OFFICE: [phone]  DR SZYMANSKI CELL: [phone]  DR. WOLFE CELL: [phone]  JESSICA GALINDO CELL: [phone]        Patient is a 89y old  Female who presents with a chief complaint of Dizziness x 1 week (28 Jun 2019 08:28)      Patient seen and examined at bedside. No chest pain/sob    VITALS:  T(F): 97.6 (06-28-19 @ 15:04), Max: 98 (06-27-19 @ 17:51)  HR: 72 (06-28-19 @ 15:04)  BP: 148/75 (06-28-19 @ 15:04)  RR: 16 (06-28-19 @ 15:04)  SpO2: 98% (06-28-19 @ 15:04)  Wt(kg): --        PHYSICAL EXAM:  Constitutional: NAD  Neck: No JVD  Respiratory: CTAB, no wheezes, rales or rhonchi  Cardiovascular: S1, S2, RRR  Gastrointestinal: BS+, soft, NT/ND  Extremities: No peripheral edema    Hospital Medications:   MEDICATIONS  (STANDING):  amLODIPine   Tablet 5 milliGRAM(s) Oral daily  aspirin enteric coated 81 milliGRAM(s) Oral daily  atorvastatin 10 milliGRAM(s) Oral at bedtime  cefTRIAXone   IVPB 1 milliGRAM(s) IV Intermittent every 24 hours  clopidogrel Tablet 75 milliGRAM(s) Oral daily  dextrose 5%. 1000 milliLiter(s) (50 mL/Hr) IV Continuous <Continuous>  dextrose 50% Injectable 12.5 Gram(s) IV Push once  dextrose 50% Injectable 25 Gram(s) IV Push once  dextrose 50% Injectable 25 Gram(s) IV Push once  heparin  Injectable 5000 Unit(s) SubCutaneous every 8 hours  insulin lispro (HumaLOG) corrective regimen sliding scale   SubCutaneous three times a day before meals  memantine 10 milliGRAM(s) Oral daily  nebivolol 2.5 milliGRAM(s) Oral daily  sodium chloride 0.9%. 1000 milliLiter(s) (75 mL/Hr) IV Continuous <Continuous>      LABS:  06-28    141  |  107  |  23  ----------------------------<  134<H>  3.8   |  19<L>  |  1.18    Ca    9.5      28 Jun 2019 05:47  Phos  4.1     06-27  Mg     1.8     06-28      Creatinine Trend: 1.18 <--, 1.11 <--, 1.27 <--, 1.14 <--                                9.9    8.11  )-----------( 254      ( 28 Jun 2019 05:47 )             31.0     Urine Studies:  Urinalysis - [06-24-19 @ 22:42]      Color LIGHT YELLOW / Appearance Lt TURBID / SG 1.008 / pH 6.0      Gluc NEGATIVE / Ketone NEGATIVE  / Bili NEGATIVE / Urobili NORMAL       Blood NEGATIVE / Protein NEGATIVE / Leuk Est LARGE / Nitrite NEGATIVE      RBC 0-2 / WBC >50 / Hyaline 3+ / Gran  / Sq Epi FEW / Non Sq Epi  / Bacteria NEGATIVE    Urine Protein 11.4      [06-26-19 @ 22:43]    Iron 58, TIBC 290, %sat --      [10-29-18 @ 07:45]  Ferritin 91.15      [10-29-18 @ 07:45]  PTH 67.85 (Ca --)      [06-26-19 @ 06:26]   --  HbA1c 6.0      [06-26-19 @ 06:26]  TSH 3.09      [06-26-19 @ 06:26]  Lipid: chol 179, TG 83, HDL 71, LDL 95      [06-26-19 @ 06:26]        RADIOLOGY & ADDITIONAL STUDIES:
Patient is a 89y old  Female who presents with a chief complaint of Dizziness x 1 week (29 Jun 2019 14:36)      INTERVAL HPI/OVERNIGHT EVENTS:  T(C): 36.8 (06-29-19 @ 14:51), Max: 36.8 (06-29-19 @ 14:51)  HR: 70 (06-29-19 @ 17:23) (64 - 70)  BP: 155/87 (06-29-19 @ 17:23) (135/67 - 155/87)  RR: 18 (06-29-19 @ 14:51) (18 - 18)  SpO2: 99% (06-29-19 @ 14:51) (97% - 99%)  Wt(kg): --  I&O's Summary    28 Jun 2019 07:01  -  29 Jun 2019 07:00  --------------------------------------------------------  IN: 160 mL / OUT: 500 mL / NET: -340 mL        LABS:                        9.5    7.44  )-----------( 239      ( 29 Jun 2019 06:06 )             29.6     06-29    142  |  109<H>  |  22  ----------------------------<  135<H>  4.1   |  20<L>  |  1.13    Ca    9.5      29 Jun 2019 06:06  Mg     1.8     06-28          CAPILLARY BLOOD GLUCOSE      POCT Blood Glucose.: 154 mg/dL (29 Jun 2019 17:31)  POCT Blood Glucose.: 136 mg/dL (29 Jun 2019 12:31)  POCT Blood Glucose.: 135 mg/dL (29 Jun 2019 08:41)  POCT Blood Glucose.: 168 mg/dL (28 Jun 2019 21:19)            MEDICATIONS  (STANDING):  amLODIPine   Tablet 5 milliGRAM(s) Oral daily  aspirin enteric coated 81 milliGRAM(s) Oral daily  atorvastatin 10 milliGRAM(s) Oral at bedtime  cefTRIAXone   IVPB 1 milliGRAM(s) IV Intermittent every 24 hours  clopidogrel Tablet 75 milliGRAM(s) Oral daily  dextrose 5%. 1000 milliLiter(s) (50 mL/Hr) IV Continuous <Continuous>  dextrose 50% Injectable 12.5 Gram(s) IV Push once  dextrose 50% Injectable 25 Gram(s) IV Push once  dextrose 50% Injectable 25 Gram(s) IV Push once  heparin  Injectable 5000 Unit(s) SubCutaneous every 8 hours  insulin lispro (HumaLOG) corrective regimen sliding scale   SubCutaneous three times a day before meals  memantine 10 milliGRAM(s) Oral daily  nebivolol 2.5 milliGRAM(s) Oral daily  sodium chloride 0.9%. 1000 milliLiter(s) (75 mL/Hr) IV Continuous <Continuous>    MEDICATIONS  (PRN):  acetaminophen   Tablet .. 650 milliGRAM(s) Oral every 6 hours PRN Mild Pain (1 - 3), Moderate Pain (4 - 6), Severe Pain (7 - 10)  dextrose 40% Gel 15 Gram(s) Oral once PRN Blood Glucose LESS THAN 70 milliGRAM(s)/deciliter  glucagon  Injectable 1 milliGRAM(s) IntraMuscular once PRN Glucose LESS THAN 70 milligrams/deciliter  meclizine 25 milliGRAM(s) Oral two times a day PRN Dizziness          PHYSICAL EXAM:  GENERAL: NAD, well-groomed, well-developed  HEAD:  Atraumatic, Normocephalic  CHEST/LUNG: Clear to percussion bilaterally; No rales, rhonchi, wheezing, or rubs  HEART: Regular rate and rhythm; No murmurs, rubs, or gallops  ABDOMEN: Soft, Nontender, Nondistended; Bowel sounds present  EXTREMITIES:  2+ Peripheral Pulses, No clubbing, cyanosis, or edema  LYMPH: No lymphadenopathy noted  SKIN: No rashes or lesions    Care Discussed with Consultants/Other Providers [ ] YES  [ ] NO

## 2019-07-03 LAB
GAS PNL BLDMV: SIGNIFICANT CHANGE UP
KAPPA/LAMBDA FREE LIGHT CHAIN RATIO, SERUM: SIGNIFICANT CHANGE UP

## 2019-07-05 LAB
GAS PNL BLDMV: SIGNIFICANT CHANGE UP
GAS PNL BLDMV: SIGNIFICANT CHANGE UP

## 2020-01-01 ENCOUNTER — OUTPATIENT (OUTPATIENT)
Dept: OUTPATIENT SERVICES | Facility: HOSPITAL | Age: 85
LOS: 1 days | End: 2020-01-01
Payer: MEDICARE

## 2020-01-01 DIAGNOSIS — Z90.710 ACQUIRED ABSENCE OF BOTH CERVIX AND UTERUS: Chronic | ICD-10-CM

## 2020-01-01 DIAGNOSIS — D25.9 LEIOMYOMA OF UTERUS, UNSPECIFIED: Chronic | ICD-10-CM

## 2020-01-01 PROCEDURE — G9001: CPT

## 2020-01-21 ENCOUNTER — INPATIENT (INPATIENT)
Facility: HOSPITAL | Age: 85
LOS: 22 days | Discharge: NURSING, REHAB OR LONG-TERM CARE | End: 2020-02-13
Attending: INTERNAL MEDICINE | Admitting: INTERNAL MEDICINE
Payer: MEDICARE

## 2020-01-21 VITALS
TEMPERATURE: 98 F | DIASTOLIC BLOOD PRESSURE: 96 MMHG | RESPIRATION RATE: 16 BRPM | HEART RATE: 103 BPM | SYSTOLIC BLOOD PRESSURE: 188 MMHG | OXYGEN SATURATION: 98 %

## 2020-01-21 DIAGNOSIS — D25.9 LEIOMYOMA OF UTERUS, UNSPECIFIED: Chronic | ICD-10-CM

## 2020-01-21 DIAGNOSIS — Z90.710 ACQUIRED ABSENCE OF BOTH CERVIX AND UTERUS: Chronic | ICD-10-CM

## 2020-01-22 DIAGNOSIS — N17.9 ACUTE KIDNEY FAILURE, UNSPECIFIED: ICD-10-CM

## 2020-01-22 DIAGNOSIS — R41.82 ALTERED MENTAL STATUS, UNSPECIFIED: ICD-10-CM

## 2020-01-22 DIAGNOSIS — Z29.9 ENCOUNTER FOR PROPHYLACTIC MEASURES, UNSPECIFIED: ICD-10-CM

## 2020-01-22 DIAGNOSIS — F03.90 UNSPECIFIED DEMENTIA WITHOUT BEHAVIORAL DISTURBANCE: ICD-10-CM

## 2020-01-22 DIAGNOSIS — E11.69 TYPE 2 DIABETES MELLITUS WITH OTHER SPECIFIED COMPLICATION: ICD-10-CM

## 2020-01-22 DIAGNOSIS — I10 ESSENTIAL (PRIMARY) HYPERTENSION: ICD-10-CM

## 2020-01-22 LAB
ALBUMIN SERPL ELPH-MCNC: 4.3 G/DL — SIGNIFICANT CHANGE UP (ref 3.3–5)
ALP SERPL-CCNC: 69 U/L — SIGNIFICANT CHANGE UP (ref 40–120)
ALT FLD-CCNC: 16 U/L — SIGNIFICANT CHANGE UP (ref 4–33)
ANION GAP SERPL CALC-SCNC: 15 MMO/L — HIGH (ref 7–14)
ANION GAP SERPL CALC-SCNC: 16 MMO/L — HIGH (ref 7–14)
APPEARANCE UR: CLEAR — SIGNIFICANT CHANGE UP
AST SERPL-CCNC: 21 U/L — SIGNIFICANT CHANGE UP (ref 4–32)
B PERT DNA SPEC QL NAA+PROBE: NOT DETECTED — SIGNIFICANT CHANGE UP
BACTERIA # UR AUTO: NEGATIVE — SIGNIFICANT CHANGE UP
BASOPHILS # BLD AUTO: 0.08 K/UL — SIGNIFICANT CHANGE UP (ref 0–0.2)
BASOPHILS NFR BLD AUTO: 0.8 % — SIGNIFICANT CHANGE UP (ref 0–2)
BILIRUB SERPL-MCNC: 0.4 MG/DL — SIGNIFICANT CHANGE UP (ref 0.2–1.2)
BILIRUB UR-MCNC: NEGATIVE — SIGNIFICANT CHANGE UP
BLOOD UR QL VISUAL: NEGATIVE — SIGNIFICANT CHANGE UP
BUN SERPL-MCNC: 22 MG/DL — SIGNIFICANT CHANGE UP (ref 7–23)
BUN SERPL-MCNC: 25 MG/DL — HIGH (ref 7–23)
C PNEUM DNA SPEC QL NAA+PROBE: NOT DETECTED — SIGNIFICANT CHANGE UP
CALCIUM SERPL-MCNC: 10.1 MG/DL — SIGNIFICANT CHANGE UP (ref 8.4–10.5)
CALCIUM SERPL-MCNC: 9.7 MG/DL — SIGNIFICANT CHANGE UP (ref 8.4–10.5)
CHLORIDE SERPL-SCNC: 104 MMOL/L — SIGNIFICANT CHANGE UP (ref 98–107)
CHLORIDE SERPL-SCNC: 105 MMOL/L — SIGNIFICANT CHANGE UP (ref 98–107)
CO2 SERPL-SCNC: 20 MMOL/L — LOW (ref 22–31)
CO2 SERPL-SCNC: 21 MMOL/L — LOW (ref 22–31)
COLOR SPEC: YELLOW — SIGNIFICANT CHANGE UP
CREAT SERPL-MCNC: 2.05 MG/DL — HIGH (ref 0.5–1.3)
CREAT SERPL-MCNC: 2.27 MG/DL — HIGH (ref 0.5–1.3)
EOSINOPHIL # BLD AUTO: 0.14 K/UL — SIGNIFICANT CHANGE UP (ref 0–0.5)
EOSINOPHIL NFR BLD AUTO: 1.3 % — SIGNIFICANT CHANGE UP (ref 0–6)
FLUAV H1 2009 PAND RNA SPEC QL NAA+PROBE: NOT DETECTED — SIGNIFICANT CHANGE UP
FLUAV H1 RNA SPEC QL NAA+PROBE: NOT DETECTED — SIGNIFICANT CHANGE UP
FLUAV H3 RNA SPEC QL NAA+PROBE: NOT DETECTED — SIGNIFICANT CHANGE UP
FLUAV SUBTYP SPEC NAA+PROBE: NOT DETECTED — SIGNIFICANT CHANGE UP
FLUBV RNA SPEC QL NAA+PROBE: NOT DETECTED — SIGNIFICANT CHANGE UP
FOLATE SERPL-MCNC: 13 NG/ML — SIGNIFICANT CHANGE UP (ref 4.7–20)
GLUCOSE SERPL-MCNC: 127 MG/DL — HIGH (ref 70–99)
GLUCOSE SERPL-MCNC: 128 MG/DL — HIGH (ref 70–99)
GLUCOSE UR-MCNC: NEGATIVE — SIGNIFICANT CHANGE UP
HADV DNA SPEC QL NAA+PROBE: NOT DETECTED — SIGNIFICANT CHANGE UP
HCOV PNL SPEC NAA+PROBE: SIGNIFICANT CHANGE UP
HCT VFR BLD CALC: 35.3 % — SIGNIFICANT CHANGE UP (ref 34.5–45)
HGB BLD-MCNC: 11.3 G/DL — LOW (ref 11.5–15.5)
HMPV RNA SPEC QL NAA+PROBE: NOT DETECTED — SIGNIFICANT CHANGE UP
HPIV1 RNA SPEC QL NAA+PROBE: NOT DETECTED — SIGNIFICANT CHANGE UP
HPIV2 RNA SPEC QL NAA+PROBE: NOT DETECTED — SIGNIFICANT CHANGE UP
HPIV3 RNA SPEC QL NAA+PROBE: NOT DETECTED — SIGNIFICANT CHANGE UP
HPIV4 RNA SPEC QL NAA+PROBE: NOT DETECTED — SIGNIFICANT CHANGE UP
HYALINE CASTS # UR AUTO: HIGH
IMM GRANULOCYTES NFR BLD AUTO: 0.2 % — SIGNIFICANT CHANGE UP (ref 0–1.5)
KETONES UR-MCNC: NEGATIVE — SIGNIFICANT CHANGE UP
LEUKOCYTE ESTERASE UR-ACNC: SIGNIFICANT CHANGE UP
LYMPHOCYTES # BLD AUTO: 4.85 K/UL — HIGH (ref 1–3.3)
LYMPHOCYTES # BLD AUTO: 45.8 % — HIGH (ref 13–44)
MCHC RBC-ENTMCNC: 31.7 PG — SIGNIFICANT CHANGE UP (ref 27–34)
MCHC RBC-ENTMCNC: 32 % — SIGNIFICANT CHANGE UP (ref 32–36)
MCV RBC AUTO: 99.2 FL — SIGNIFICANT CHANGE UP (ref 80–100)
MONOCYTES # BLD AUTO: 0.59 K/UL — SIGNIFICANT CHANGE UP (ref 0–0.9)
MONOCYTES NFR BLD AUTO: 5.6 % — SIGNIFICANT CHANGE UP (ref 2–14)
NEUTROPHILS # BLD AUTO: 4.91 K/UL — SIGNIFICANT CHANGE UP (ref 1.8–7.4)
NEUTROPHILS NFR BLD AUTO: 46.3 % — SIGNIFICANT CHANGE UP (ref 43–77)
NITRITE UR-MCNC: NEGATIVE — SIGNIFICANT CHANGE UP
NRBC # FLD: 0 K/UL — SIGNIFICANT CHANGE UP (ref 0–0)
PH UR: 6 — SIGNIFICANT CHANGE UP (ref 5–8)
PLATELET # BLD AUTO: 247 K/UL — SIGNIFICANT CHANGE UP (ref 150–400)
PMV BLD: 10.6 FL — SIGNIFICANT CHANGE UP (ref 7–13)
POTASSIUM SERPL-MCNC: 4.7 MMOL/L — SIGNIFICANT CHANGE UP (ref 3.5–5.3)
POTASSIUM SERPL-MCNC: 4.9 MMOL/L — SIGNIFICANT CHANGE UP (ref 3.5–5.3)
POTASSIUM SERPL-SCNC: 4.7 MMOL/L — SIGNIFICANT CHANGE UP (ref 3.5–5.3)
POTASSIUM SERPL-SCNC: 4.9 MMOL/L — SIGNIFICANT CHANGE UP (ref 3.5–5.3)
PROT SERPL-MCNC: 7.9 G/DL — SIGNIFICANT CHANGE UP (ref 6–8.3)
PROT UR-MCNC: 30 — SIGNIFICANT CHANGE UP
RBC # BLD: 3.56 M/UL — LOW (ref 3.8–5.2)
RBC # FLD: 12.5 % — SIGNIFICANT CHANGE UP (ref 10.3–14.5)
RBC CASTS # UR COMP ASSIST: SIGNIFICANT CHANGE UP (ref 0–?)
RSV RNA SPEC QL NAA+PROBE: NOT DETECTED — SIGNIFICANT CHANGE UP
RV+EV RNA SPEC QL NAA+PROBE: NOT DETECTED — SIGNIFICANT CHANGE UP
SODIUM SERPL-SCNC: 140 MMOL/L — SIGNIFICANT CHANGE UP (ref 135–145)
SODIUM SERPL-SCNC: 141 MMOL/L — SIGNIFICANT CHANGE UP (ref 135–145)
SP GR SPEC: 1.02 — SIGNIFICANT CHANGE UP (ref 1–1.04)
SQUAMOUS # UR AUTO: SIGNIFICANT CHANGE UP
TROPONIN T, HIGH SENSITIVITY: 26 NG/L — SIGNIFICANT CHANGE UP (ref ?–14)
TROPONIN T, HIGH SENSITIVITY: 29 NG/L — SIGNIFICANT CHANGE UP (ref ?–14)
TSH SERPL-MCNC: 3.26 UIU/ML — SIGNIFICANT CHANGE UP (ref 0.27–4.2)
UROBILINOGEN FLD QL: NORMAL — SIGNIFICANT CHANGE UP
VIT B12 SERPL-MCNC: > 2000 PG/ML — HIGH (ref 200–900)
WBC # BLD: 10.59 K/UL — HIGH (ref 3.8–10.5)
WBC # FLD AUTO: 10.59 K/UL — HIGH (ref 3.8–10.5)
WBC UR QL: HIGH (ref 0–?)

## 2020-01-22 PROCEDURE — 70450 CT HEAD/BRAIN W/O DYE: CPT | Mod: 26

## 2020-01-22 PROCEDURE — 71045 X-RAY EXAM CHEST 1 VIEW: CPT | Mod: 26

## 2020-01-22 PROCEDURE — 99223 1ST HOSP IP/OBS HIGH 75: CPT | Mod: AI

## 2020-01-22 PROCEDURE — 93010 ELECTROCARDIOGRAM REPORT: CPT

## 2020-01-22 RX ORDER — AMLODIPINE BESYLATE 2.5 MG/1
10 TABLET ORAL DAILY
Refills: 0 | Status: DISCONTINUED | OUTPATIENT
Start: 2020-01-22 | End: 2020-02-13

## 2020-01-22 RX ORDER — MEMANTINE HYDROCHLORIDE 10 MG/1
1 TABLET ORAL
Qty: 0 | Refills: 0 | DISCHARGE

## 2020-01-22 RX ORDER — ONDANSETRON 8 MG/1
4 TABLET, FILM COATED ORAL EVERY 6 HOURS
Refills: 0 | Status: DISCONTINUED | OUTPATIENT
Start: 2020-01-22 | End: 2020-02-13

## 2020-01-22 RX ORDER — DEXTROSE 50 % IN WATER 50 %
12.5 SYRINGE (ML) INTRAVENOUS ONCE
Refills: 0 | Status: DISCONTINUED | OUTPATIENT
Start: 2020-01-22 | End: 2020-02-13

## 2020-01-22 RX ORDER — MECLIZINE HCL 12.5 MG
1 TABLET ORAL
Qty: 0 | Refills: 0 | DISCHARGE

## 2020-01-22 RX ORDER — SENNA PLUS 8.6 MG/1
2 TABLET ORAL AT BEDTIME
Refills: 0 | Status: DISCONTINUED | OUTPATIENT
Start: 2020-01-22 | End: 2020-02-06

## 2020-01-22 RX ORDER — ASPIRIN/CALCIUM CARB/MAGNESIUM 324 MG
81 TABLET ORAL DAILY
Refills: 0 | Status: DISCONTINUED | OUTPATIENT
Start: 2020-01-22 | End: 2020-02-13

## 2020-01-22 RX ORDER — MECLIZINE HCL 12.5 MG
25 TABLET ORAL
Refills: 0 | Status: DISCONTINUED | OUTPATIENT
Start: 2020-01-22 | End: 2020-02-13

## 2020-01-22 RX ORDER — ERGOCALCIFEROL 1.25 MG/1
1 CAPSULE ORAL
Qty: 0 | Refills: 0 | DISCHARGE

## 2020-01-22 RX ORDER — INSULIN LISPRO 100/ML
VIAL (ML) SUBCUTANEOUS AT BEDTIME
Refills: 0 | Status: DISCONTINUED | OUTPATIENT
Start: 2020-01-22 | End: 2020-02-13

## 2020-01-22 RX ORDER — DEXTROSE 50 % IN WATER 50 %
15 SYRINGE (ML) INTRAVENOUS ONCE
Refills: 0 | Status: DISCONTINUED | OUTPATIENT
Start: 2020-01-22 | End: 2020-02-13

## 2020-01-22 RX ORDER — SODIUM CHLORIDE 9 MG/ML
1000 INJECTION, SOLUTION INTRAVENOUS
Refills: 0 | Status: DISCONTINUED | OUTPATIENT
Start: 2020-01-22 | End: 2020-02-13

## 2020-01-22 RX ORDER — MEMANTINE HYDROCHLORIDE 10 MG/1
10 TABLET ORAL DAILY
Refills: 0 | Status: DISCONTINUED | OUTPATIENT
Start: 2020-01-22 | End: 2020-02-13

## 2020-01-22 RX ORDER — HALOPERIDOL DECANOATE 100 MG/ML
0.5 INJECTION INTRAMUSCULAR ONCE
Refills: 0 | Status: COMPLETED | OUTPATIENT
Start: 2020-01-22 | End: 2020-01-23

## 2020-01-22 RX ORDER — DEXTROSE 50 % IN WATER 50 %
25 SYRINGE (ML) INTRAVENOUS ONCE
Refills: 0 | Status: DISCONTINUED | OUTPATIENT
Start: 2020-01-22 | End: 2020-02-13

## 2020-01-22 RX ORDER — INSULIN LISPRO 100/ML
VIAL (ML) SUBCUTANEOUS
Refills: 0 | Status: DISCONTINUED | OUTPATIENT
Start: 2020-01-22 | End: 2020-02-13

## 2020-01-22 RX ORDER — HALOPERIDOL DECANOATE 100 MG/ML
1 INJECTION INTRAMUSCULAR ONCE
Refills: 0 | Status: COMPLETED | OUTPATIENT
Start: 2020-01-22 | End: 2020-01-22

## 2020-01-22 RX ORDER — MULTIVIT-MIN/FERROUS GLUCONATE 9 MG/15 ML
1 LIQUID (ML) ORAL
Qty: 0 | Refills: 0 | DISCHARGE

## 2020-01-22 RX ORDER — HEPARIN SODIUM 5000 [USP'U]/ML
5000 INJECTION INTRAVENOUS; SUBCUTANEOUS EVERY 8 HOURS
Refills: 0 | Status: DISCONTINUED | OUTPATIENT
Start: 2020-01-22 | End: 2020-02-13

## 2020-01-22 RX ORDER — HALOPERIDOL DECANOATE 100 MG/ML
0.25 INJECTION INTRAMUSCULAR ONCE
Refills: 0 | Status: COMPLETED | OUTPATIENT
Start: 2020-01-22 | End: 2020-01-22

## 2020-01-22 RX ORDER — CEFTRIAXONE 500 MG/1
1000 INJECTION, POWDER, FOR SOLUTION INTRAMUSCULAR; INTRAVENOUS ONCE
Refills: 0 | Status: DISCONTINUED | OUTPATIENT
Start: 2020-01-22 | End: 2020-01-22

## 2020-01-22 RX ORDER — GLUCAGON INJECTION, SOLUTION 0.5 MG/.1ML
1 INJECTION, SOLUTION SUBCUTANEOUS ONCE
Refills: 0 | Status: DISCONTINUED | OUTPATIENT
Start: 2020-01-22 | End: 2020-02-13

## 2020-01-22 RX ORDER — SODIUM CHLORIDE 9 MG/ML
1000 INJECTION INTRAMUSCULAR; INTRAVENOUS; SUBCUTANEOUS
Refills: 0 | Status: DISCONTINUED | OUTPATIENT
Start: 2020-01-22 | End: 2020-01-22

## 2020-01-22 RX ORDER — METOPROLOL TARTRATE 50 MG
12.5 TABLET ORAL
Refills: 0 | Status: DISCONTINUED | OUTPATIENT
Start: 2020-01-22 | End: 2020-02-13

## 2020-01-22 RX ORDER — SODIUM CHLORIDE 9 MG/ML
1000 INJECTION INTRAMUSCULAR; INTRAVENOUS; SUBCUTANEOUS
Refills: 0 | Status: DISCONTINUED | OUTPATIENT
Start: 2020-01-22 | End: 2020-02-13

## 2020-01-22 RX ADMIN — MEMANTINE HYDROCHLORIDE 10 MILLIGRAM(S): 10 TABLET ORAL at 18:59

## 2020-01-22 RX ADMIN — AMLODIPINE BESYLATE 10 MILLIGRAM(S): 2.5 TABLET ORAL at 18:59

## 2020-01-22 RX ADMIN — Medication 1: at 18:59

## 2020-01-22 RX ADMIN — Medication 81 MILLIGRAM(S): at 18:59

## 2020-01-22 RX ADMIN — HALOPERIDOL DECANOATE 1 MILLIGRAM(S): 100 INJECTION INTRAMUSCULAR at 21:12

## 2020-01-22 RX ADMIN — HALOPERIDOL DECANOATE 0.25 MILLIGRAM(S): 100 INJECTION INTRAMUSCULAR at 16:12

## 2020-01-22 RX ADMIN — HEPARIN SODIUM 5000 UNIT(S): 5000 INJECTION INTRAVENOUS; SUBCUTANEOUS at 17:36

## 2020-01-22 RX ADMIN — Medication 12.5 MILLIGRAM(S): at 17:37

## 2020-01-22 RX ADMIN — SODIUM CHLORIDE 60 MILLILITER(S): 9 INJECTION INTRAMUSCULAR; INTRAVENOUS; SUBCUTANEOUS at 15:47

## 2020-01-22 NOTE — H&P ADULT - PROBLEM SELECTOR PLAN 3
Unknown baseline,   -contact famly re: collaterals, baseline MS Unknown baseline,   -contact famly re: collaterals, baseline MS  -Cont homemeds

## 2020-01-22 NOTE — CHART NOTE - NSCHARTNOTEFT_GEN_A_CORE
nephrology consulted (Dr Brand) for MAGALIE w/u     neuro (Dr Chvaira) Consulted for AMS w/u     follow up recommednations

## 2020-01-22 NOTE — PATIENT PROFILE ADULT - NSPROGENSOURCEINFO_GEN_A_NUR
AMBIKA ; d/t confusion. No family present at bedside at this time AMBIKA ; d/t confusion. No family present at bedside at this time.

## 2020-01-22 NOTE — ED PROVIDER NOTE - PHYSICAL EXAMINATION
PHYSICAL EXAM:  GENERAL: NAD. Patient is alert and confused  HEAD:  Atraumatic, Normocephalic  EYES: EOMI, PERRLA, conjunctiva and sclera clear  ENT: Moist mucous membranes  NECK: Supple, No JVD  CHEST/LUNG: Clear to auscultation bilaterally  HEART: Regular rate and rhythm; No murmurs, rubs, or gallops  ABDOMEN: Bowel sounds present; Soft, Nontender, Nondistended. No hepatomegaly  EXTREMITIES:  2+ Peripheral Pulses, brisk capillary refill. No clubbing, cyanosis, or edema  NERVOUS SYSTEM:  Alert & Oriented X1 (name), follows commands   MSK: unable to assess due to mental status  SKIN: No rashes or lesions

## 2020-01-22 NOTE — H&P ADULT - GASTROINTESTINAL DETAILS
nontender/soft/no rebound tenderness/bowel sounds normal/no rigidity/no guarding/no organomegaly/no distention

## 2020-01-22 NOTE — PATIENT PROFILE ADULT - NSPROGENDIFFINTUB_GEN_A_NUR
AMBIKA ; d/t confusion. No family present at bedside at this time never intubated/AMBIKA ; d/t confusion. No family present at bedside at this time

## 2020-01-22 NOTE — PATIENT PROFILE ADULT - NSPROIMPLANTSMEDDEV_GEN_A_NUR
AMBIKA ; d/t confusion. No family present at bedside at this time None/AMBIKA ; d/t confusion. No family present at bedside at this time

## 2020-01-22 NOTE — PATIENT PROFILE ADULT - NSPROCHRONICPAIN_GEN_A_NUR
AMBIKA ; d/t confusion. No family present at bedside at this time AMBIKA ; d/t confusion. No family present at bedside at this time/no

## 2020-01-22 NOTE — H&P ADULT - RS GEN PE MLT RESP DETAILS PC
airway patent/good air movement/respirations non-labored/no rhonchi/no wheezes/breath sounds equal/clear to auscultation bilaterally/no rales

## 2020-01-22 NOTE — H&P ADULT - NSHPPHYSICALEXAM_GEN_ALL_CORE
Vital Signs Last 24 Hrs  T(C): 36.3 (22 Jan 2020 12:07), Max: 37.1 (22 Jan 2020 08:06)  T(F): 97.4 (22 Jan 2020 12:07), Max: 98.8 (22 Jan 2020 08:06)  HR: 99 (22 Jan 2020 12:07) (99 - 103)  BP: 155/104 (22 Jan 2020 12:07) (155/104 - 188/96)  BP(mean): --  RR: 18 (22 Jan 2020 12:07) (16 - 18)  SpO2: 99% (22 Jan 2020 12:07) (98% - 99%)

## 2020-01-22 NOTE — ED PROVIDER NOTE - OBJECTIVE STATEMENT
89 Creole speaking female, presents from aide for evaluation of AMX. Patient is a poor historian, AAOx2 at baseline and hard of hearing. Unable to obtain history. Patient denies symptoms. She reports that she was on a train [place].     PCP- Dr Buster Paulson 89 Creole speaking female, presents from aide for evaluation of AMS. Patient is a poor historian, AAOx2 at baseline and hard of hearing. Unable to obtain history. Patient denies symptoms. She reports that she was on a train [place] and is unable to recall how she ended up at the hospital. Unable to reach family members for collateral information.     PCP- Dr Buster Paulson

## 2020-01-22 NOTE — CONSULT NOTE ADULT - SUBJECTIVE AND OBJECTIVE BOX
[facility]        Patient is a 89y old  Female who presents with a chief complaint of   Excerpt from H&P  89 Creole speaking female, h/o dementia, DM2, HTN,  presents from aide for evaluation of AMS. Patient is a poor historian, AAOx2 at baseline and hard of hearing. Unable to obtain history. Patient denies symptoms. She reports that she was on a train [place] and is unable to recall how she ended up at the hospital. Unable to reach family members for collateral information.   Currently, only has mild back pain, denies any CP or SOB.           *****PAST MEDICAL / Surgical  HISTORY:  PAST MEDICAL & SURGICAL HISTORY:  Obesity  Anemia  Dementia  OA (osteoarthritis)  Chronic low back pain  Shingles  DM (diabetes mellitus)  HTN (hypertension)  S/P hysterectomy with oophorectomy  Uterine fibroid           *****FAMILY HISTORY:  FAMILY HISTORY:  No pertinent family history in first degree relatives           *****SOCIAL HISTORY:  Alcohol: None  Smoking: None         *****ALLERGIES:   Allergies    No Known Allergies    Intolerances             *****MEDICATIONS: current medication reviewed and documented.   MEDICATIONS  (STANDING):  amLODIPine   Tablet 10 milliGRAM(s) Oral daily  aspirin enteric coated 81 milliGRAM(s) Oral daily  dextrose 5%. 1000 milliLiter(s) (50 mL/Hr) IV Continuous <Continuous>  dextrose 50% Injectable 12.5 Gram(s) IV Push once  dextrose 50% Injectable 25 Gram(s) IV Push once  dextrose 50% Injectable 25 Gram(s) IV Push once  heparin  Injectable 5000 Unit(s) SubCutaneous every 8 hours  insulin lispro (HumaLOG) corrective regimen sliding scale   SubCutaneous three times a day before meals  insulin lispro (HumaLOG) corrective regimen sliding scale   SubCutaneous at bedtime  memantine 10 milliGRAM(s) Oral daily  metoprolol tartrate 12.5 milliGRAM(s) Oral two times a day  multivitamin 1 Tablet(s) Oral daily  sodium chloride 0.9%. 1000 milliLiter(s) (60 mL/Hr) IV Continuous <Continuous>    MEDICATIONS  (PRN):  bisacodyl 5 milliGRAM(s) Oral daily PRN Constipation  dextrose 40% Gel 15 Gram(s) Oral once PRN Blood Glucose LESS THAN 70 milliGRAM(s)/deciliter  glucagon  Injectable 1 milliGRAM(s) IntraMuscular once PRN Glucose LESS THAN 70 milligrams/deciliter  meclizine 25 milliGRAM(s) Oral two times a day PRN Dizziness  ondansetron Injectable 4 milliGRAM(s) IV Push every 6 hours PRN Nausea  senna 2 Tablet(s) Oral at bedtime PRN Constipation           *****REVIEW OF SYSTEM:  GEN: no fever, no chills, no pain  RESP: no SOB, no cough, no sputum  CVS: no chest pain, no palpitations, no edema  GI: no abdominal pain, no nausea, no vomiting, no constipation, no diarrhea  : no dysurea, no frequency, no hematurea  Neuro: no headache, no dizziness  PSYCH: no anxiety, no depression  Derm : no itching, no rash         *****VITAL SIGNS:  T(C): 36.8 (20 @ 19:58), Max: 37.1 (20 @ 08:06)  HR: 108 (20 @ 19:58) (99 - 108)  BP: 178/118 (20 @ 19:58) (155/104 - 179/97)  RR: 18 (20 @ 19:58) (16 - 18)  SpO2: 99% (20 @ 19:58) (99% - 99%)  Wt(kg): --           *****PHYSICAL EXAM:   Alert   agitated   with 1 to 1   refusing to answer questions in english or creole  not following any commands    EOMI tracking No facial asymmetry   Tongue is midline   Palate elevates symmetrically   Moving all 4 ext symmetrically   does not  cooperate with resistance testing.   Gait : not assessed.  B/L down going toes               *****LAB AND IMAGIN.3   10.59 )-----------( 247      ( 2020 01:38 )             35.3                   140  |  105  |  25<H>  ----------------------------<  127<H>  4.9   |  20<L>  |  2.27<H>    Ca    9.7      2020 08:15    TPro  7.9  /  Alb  4.3  /  TBili  0.4  /  DBili  x   /  AST  21  /  ALT  16  /  AlkPhos  69                              Urinalysis Basic - ( 2020 03:20 )    Color: YELLOW / Appearance: CLEAR / S.019 / pH: 6.0  Gluc: NEGATIVE / Ketone: NEGATIVE  / Bili: NEGATIVE / Urobili: NORMAL   Blood: NEGATIVE / Protein: 30 / Nitrite: NEGATIVE   Leuk Esterase: SMALL / RBC: 0-2 / WBC 6-10   Sq Epi: FEW / Non Sq Epi: x / Bacteria: NEGATIVE        [All pertinent recent Imaging reports reviewed]         *****A S S E S S M E N T   A N D   P L A N :   Excerpt from H&P  89 Creole speaking female, h/o dementia, DM2, HTN,  presents from aide for evaluation of AMS. Patient is a poor historian, AAOx2 at baseline and hard of hearing. Unable to obtain history. Patient denies symptoms. She reports that she was on a train [place] and is unable to recall how she ended up at the hospital. Unable to reach family members for collateral information.   Currently, only has mild back pain, denies any CP or SOB.         limited exam as pt is not cooperative.  Problem/Recommendations 1 Agitation likely sundowning due to dementia  will [id number] sleep wake cycle  r/o infectious process       ___________________________  Will follow with you.  Thank you,  Stormy Chavira MD  Diplomate of the American Board of Neurology and Psychiatry.  Diplomate of the American Board of Vascular Neurology.   [facility] ([facility]), [facility]   Ph: [phone]    Differential diagnosis and plan of care discussed with patient after the evaluation.   Advanced care planning options discussed.   Pain assessed and judicious use of narcotics when appropriate was discussed.  Importance of Fall prevention discussed.  Counseling on Smoking and Alcohol cessation was offered when appropriate.  Counseling on Diet, exercise, and medication compliance was done.   83 minutes spent on the total encounter;  more than 50 % of the visit was spent on counseling  and or coordinating care by the attending physician.    Thank you for allowing me to participate in the care of this whtiney patient. Please do not hesitate to call me if you have any questions.     This and subsequent notes were partially created using voice recognition software and will  inherently be subject to errors including those of syntax and sound alike substitutions which may escape proofreading. In such instances original meaning may be extrapolated by contextual derivation.

## 2020-01-22 NOTE — CONSULT NOTE ADULT - SUBJECTIVE AND OBJECTIVE BOX
[facility] - Lexie / Salima S /Oliver/ ZAYNAB Coughlin/ ZAYNAB Hidalgo/ Johnson Livingston / JACOB Njeru  ---------------------------------------------------------------------------------------------------------------  Patient seen and examined in ER, earlier this afternoon    89 Creole speaking female, h/o dementia, DM2, HTN,  presented from aide for evaluation of AMS. As per chart, Patient is a poor historian, AAOx2 at baseline and hard of hearing. Unable to obtain history from pt. Renal consulted for MAGALIE.   no V/D, ate breakfast per ER RN.     PAST MEDICAL & SURGICAL HISTORY:  Obesity  Anemia  Dementia  OA (osteoarthritis)  Chronic low back pain  Shingles  DM (diabetes mellitus)  HTN (hypertension)  S/P hysterectomy with oophorectomy  Uterine fibroid      Allergies: No Known Allergies    Home Medications Reviewed  Hospital Medications:   MEDICATIONS  (STANDING):  amLODIPine   Tablet 10 milliGRAM(s) Oral daily  aspirin enteric coated 81 milliGRAM(s) Oral daily  dextrose 5%. 1000 milliLiter(s) (50 mL/Hr) IV Continuous <Continuous>  dextrose 50% Injectable 12.5 Gram(s) IV Push once  dextrose 50% Injectable 25 Gram(s) IV Push once  dextrose 50% Injectable 25 Gram(s) IV Push once  heparin  Injectable 5000 Unit(s) SubCutaneous every 8 hours  insulin lispro (HumaLOG) corrective regimen sliding scale   SubCutaneous three times a day before meals  insulin lispro (HumaLOG) corrective regimen sliding scale   SubCutaneous at bedtime  memantine 10 milliGRAM(s) Oral daily  metoprolol tartrate 12.5 milliGRAM(s) Oral two times a day  multivitamin 1 Tablet(s) Oral daily  sodium chloride 0.9%. 1000 milliLiter(s) (50 mL/Hr) IV Continuous <Continuous>    SOCIAL HISTORY:  no documented ETOh,Smoking, illicit drug use  FAMILY HISTORY:  No pertinent family history in first degree relatives      REVIEW OF SYSTEMS:  limited. unable to obtain from pt, oriented x1 only, NAD.     VITALS:  T(F): 97.4 (20 @ 12:07), Max: 98.8 (20 @ 08:06)  HR: 99 (20 @ 12:07)  BP: 155/104 (20 @ 12:07)  RR: 18 (20 @ 12:07)  SpO2: 99% (20 @ 12:07)  Wt(kg): --        PHYSICAL EXAM:  Constitutional: NAD  HEENT: anicteric sclera, oropharynx clear, MMM  Neck: No JVD  Respiratory: CTAB, no wheezes, rales or rhonchi  Cardiovascular: S1, S2, RRR  Gastrointestinal: BS+, soft, NT, obese  Extremities: No cyanosis or clubbing. No peripheral edema  Neurological: A/O x 1  : No murcia.       LABS:      140  |  105  |  25<H>  ----------------------------<  127<H>  4.9   |  20<L>  |  2.27<H>    Ca    9.7      2020 08:15    TPro  7.9  /  Alb  4.3  /  TBili  0.4  /  DBili      /  AST  21  /  ALT  16  /  AlkPhos  69      Creatinine Trend: 2.27 <--, 2.05 <--                        11.3   10.59 )-----------( 247      ( 2020 01:38 )             35.3     Urine Studies:  Urinalysis Basic - ( 2020 03:20 )    Color: YELLOW / Appearance: CLEAR / S.019 / pH: 6.0  Gluc: NEGATIVE / Ketone: NEGATIVE  / Bili: NEGATIVE / Urobili: NORMAL   Blood: NEGATIVE / Protein: 30 / Nitrite: NEGATIVE   Leuk Esterase: SMALL / RBC: 0-2 / WBC 6-10   Sq Epi: FEW / Non Sq Epi:  / Bacteria: NEGATIVE          RADIOLOGY & ADDITIONAL STUDIES:  < from: Xray Chest 1 View- PORTABLE-Urgent (20 @ 02:13) >    IMPRESSION:     Clear lungs.    < end of copied text >

## 2020-01-22 NOTE — ED ADULT NURSE REASSESSMENT NOTE - NS ED NURSE REASSESS COMMENT FT1
Break coverage RN, received pt to spot 26A, noted to be anxious and restless, climbing out of bed on assessment, A&Ox1, not redirectable. ACP team notified, no meds ordered at this time, pt placed on CO for safety, PCA at bedside, belongings in closet, valuables sent to security, will continue to monitor.

## 2020-01-22 NOTE — PATIENT PROFILE ADULT - NSPROGENANESREACTION_GEN_A_NUR
AMBIKA ; d/t confusion. No family present at bedside at this time never had anesthesia/AMBIKA ; d/t confusion. No family present at bedside at this time

## 2020-01-22 NOTE — H&P ADULT - PROBLEM SELECTOR PLAN 1
Etiology is not clear, no signs of infection. ? deterioration of dementia.  -monitor for signs of infection   -Fall precaution  -Consider Haldol PRN for agitation

## 2020-01-22 NOTE — ED PROVIDER NOTE - ATTENDING CONTRIBUTION TO CARE
Dr. Christine:  I have personally performed a face to face bedside history and physical examination of this patient. I have discussed the history, examination, review of systems, assessment and plan of management with the resident. I have reviewed the electronic medical record and amended it to reflect my history, review of systems, physical exam, assessment and plan.       dementia, HTN, Obesity, Anemia Dr. Christine:  I have personally performed a face to face bedside history and physical examination of this patient. I have discussed the history, examination, review of systems, assessment and plan of management with the resident. I have reviewed the electronic medical record and amended it to reflect my history, review of systems, physical exam, assessment and plan.    89F h/o dementia, HTN, Obesity, Anemia, reportedly here because her home aide called 911 because patient endorsed "not feeling well."  Spoke with patient using Creole , but patient unable to provide history.  Does not know why she is in hospital, denies any acute complaints.      Exam:  - nad  - rrr  - ctab   -abd soft ntnd    A/P  - altered mental status, eval infectious source  - cbc, cmp, trop, rvp, cxr, ekg, CT head

## 2020-01-22 NOTE — H&P ADULT - PROBLEM SELECTOR PLAN 2
Creat 2, unknown baselne.  -Monitor Renal function  -Renal consult  -check urine lytes, osmo, creat  -Renal sono  -gentle  hydration Creat 2, July 2019 1.2.  -Monitor Renal function  -Renal consult  -check urine lytes, osmo, creat  -Renal sono  -gentle  hydration X 1 L

## 2020-01-22 NOTE — PATIENT PROFILE ADULT - OVER THE PAST TWO WEEKS HAVE YOU FELT DOWN, DEPRESSED OR HOPELESS?
AMBIKA ; d/t confusion. No family present at bedside at this time no/AMBIKA ; d/t confusion. No family present at bedside at this time

## 2020-01-22 NOTE — ED PROVIDER NOTE - CLINICAL SUMMARY MEDICAL DECISION MAKING FREE TEXT BOX
89 Creole speaking female, presents from aide for evaluation of AMS. Unable to obtain collateral info. Will f/u on CTH non-con, CXR, U/A, blood work and urine cx. 89 Creole speaking female, presents from aide for evaluation of AMS. Unable to obtain collateral info. Will f/u on CTH non-con, CXR, U/A, blood work and urine cx. Patient admitted for further workup.

## 2020-01-22 NOTE — H&P ADULT - ASSESSMENT
89 Creole speaking female, h/o dementia, DM2, HTN,  presents from aide for evaluation of AMS. etiology is not clear.

## 2020-01-22 NOTE — H&P ADULT - HISTORY OF PRESENT ILLNESS
89 Creole speaking female, h/o dementia, DM2, HTN,  presents from aide for evaluation of AMS. Patient is a poor historian, AAOx2 at baseline and hard of hearing. Unable to obtain history. Patient denies symptoms. She reports that she was on a train [place] and is unable to recall how she ended up at the hospital. Unable to reach family members for collateral information.   Currently, only has mild back pain, denies any CP or SOB.     PCP- Dr Buster Paulson

## 2020-01-22 NOTE — ED PROVIDER NOTE - CARE PLAN
Principal Discharge DX:	Altered mental status Principal Discharge DX:	Altered mental status  Secondary Diagnosis:	MAGALIE (acute kidney injury)

## 2020-01-22 NOTE — ED ADULT NURSE NOTE - NSIMPLEMENTINTERV_GEN_ALL_ED
Implemented All Fall with Harm Risk Interventions:  [place] to call system. Call bell, personal items and telephone within reach. Instruct patient to call for assistance. Room bathroom lighting operational. Non-slip footwear when patient is off stretcher. Physically safe environment: no spills, clutter or unnecessary equipment. Stretcher in lowest position, wheels locked, appropriate side rails in place. Provide visual cue, wrist band, yellow gown, etc. Monitor gait and stability. Monitor for mental status changes and reorient to person, place, and time. Review medications for side effects contributing to fall risk. Reinforce activity limits and safety measures with patient and family. Provide visual clues: red socks.

## 2020-01-22 NOTE — PATIENT PROFILE ADULT - PATIENT/CAREGIVER ACCEPTED INTERPRETER SERVICES
Pt unable to  participate in  services at this time d/t confusion and not answering questions appropriately/yes

## 2020-01-22 NOTE — PATIENT PROFILE ADULT - IS PATIENT POST-MENOPAUSAL?
AMBIKA ; d/t confusion. No family present at bedside at this time AMBIKA ; d/t confusion. No family present at bedside at this time/yes

## 2020-01-22 NOTE — ED ADULT NURSE NOTE - OBJECTIVE STATEMENT
pt is here for following up.  Her Aide called for pt stating she's not feeling well, c/o general pain, denied chest pain or sob, no distress noted at this time.

## 2020-01-23 LAB
ALBUMIN SERPL ELPH-MCNC: 4.5 G/DL — SIGNIFICANT CHANGE UP (ref 3.3–5)
ALP SERPL-CCNC: 75 U/L — SIGNIFICANT CHANGE UP (ref 40–120)
ALT FLD-CCNC: 16 U/L — SIGNIFICANT CHANGE UP (ref 4–33)
ANION GAP SERPL CALC-SCNC: 11 MMO/L — SIGNIFICANT CHANGE UP (ref 7–14)
ANION GAP SERPL CALC-SCNC: 11 MMO/L — SIGNIFICANT CHANGE UP (ref 7–14)
AST SERPL-CCNC: 24 U/L — SIGNIFICANT CHANGE UP (ref 4–32)
BACTERIA UR CULT: SIGNIFICANT CHANGE UP
BASOPHILS # BLD AUTO: 0.07 K/UL — SIGNIFICANT CHANGE UP (ref 0–0.2)
BASOPHILS NFR BLD AUTO: 0.7 % — SIGNIFICANT CHANGE UP (ref 0–2)
BILIRUB SERPL-MCNC: 0.6 MG/DL — SIGNIFICANT CHANGE UP (ref 0.2–1.2)
BUN SERPL-MCNC: 26 MG/DL — HIGH (ref 7–23)
BUN SERPL-MCNC: 26 MG/DL — HIGH (ref 7–23)
CALCIUM SERPL-MCNC: 10.7 MG/DL — HIGH (ref 8.4–10.5)
CALCIUM SERPL-MCNC: 10.7 MG/DL — HIGH (ref 8.4–10.5)
CHLORIDE SERPL-SCNC: 104 MMOL/L — SIGNIFICANT CHANGE UP (ref 98–107)
CHLORIDE SERPL-SCNC: 104 MMOL/L — SIGNIFICANT CHANGE UP (ref 98–107)
CHLORIDE UR-SCNC: 124 MMOL/L — SIGNIFICANT CHANGE UP
CO2 SERPL-SCNC: 26 MMOL/L — SIGNIFICANT CHANGE UP (ref 22–31)
CO2 SERPL-SCNC: 26 MMOL/L — SIGNIFICANT CHANGE UP (ref 22–31)
CREAT SERPL-MCNC: 2.09 MG/DL — HIGH (ref 0.5–1.3)
CREAT SERPL-MCNC: 2.09 MG/DL — HIGH (ref 0.5–1.3)
EOSINOPHIL # BLD AUTO: 0.24 K/UL — SIGNIFICANT CHANGE UP (ref 0–0.5)
EOSINOPHIL NFR BLD AUTO: 2.4 % — SIGNIFICANT CHANGE UP (ref 0–6)
GLUCOSE SERPL-MCNC: 146 MG/DL — HIGH (ref 70–99)
GLUCOSE SERPL-MCNC: 146 MG/DL — HIGH (ref 70–99)
HBA1C BLD-MCNC: 6.4 % — HIGH (ref 4–5.6)
HCT VFR BLD CALC: 35.6 % — SIGNIFICANT CHANGE UP (ref 34.5–45)
HCT VFR BLD CALC: 35.6 % — SIGNIFICANT CHANGE UP (ref 34.5–45)
HGB BLD-MCNC: 11.5 G/DL — SIGNIFICANT CHANGE UP (ref 11.5–15.5)
HGB BLD-MCNC: 11.5 G/DL — SIGNIFICANT CHANGE UP (ref 11.5–15.5)
IMM GRANULOCYTES NFR BLD AUTO: 0.2 % — SIGNIFICANT CHANGE UP (ref 0–1.5)
LYMPHOCYTES # BLD AUTO: 4.61 K/UL — HIGH (ref 1–3.3)
LYMPHOCYTES # BLD AUTO: 46.5 % — HIGH (ref 13–44)
MAGNESIUM SERPL-MCNC: 1.5 MG/DL — LOW (ref 1.6–2.6)
MCHC RBC-ENTMCNC: 32 PG — SIGNIFICANT CHANGE UP (ref 27–34)
MCHC RBC-ENTMCNC: 32 PG — SIGNIFICANT CHANGE UP (ref 27–34)
MCHC RBC-ENTMCNC: 32.3 % — SIGNIFICANT CHANGE UP (ref 32–36)
MCHC RBC-ENTMCNC: 32.3 % — SIGNIFICANT CHANGE UP (ref 32–36)
MCV RBC AUTO: 99.2 FL — SIGNIFICANT CHANGE UP (ref 80–100)
MCV RBC AUTO: 99.2 FL — SIGNIFICANT CHANGE UP (ref 80–100)
MONOCYTES # BLD AUTO: 0.68 K/UL — SIGNIFICANT CHANGE UP (ref 0–0.9)
MONOCYTES NFR BLD AUTO: 6.9 % — SIGNIFICANT CHANGE UP (ref 2–14)
NEUTROPHILS # BLD AUTO: 4.29 K/UL — SIGNIFICANT CHANGE UP (ref 1.8–7.4)
NEUTROPHILS NFR BLD AUTO: 43.3 % — SIGNIFICANT CHANGE UP (ref 43–77)
NRBC # FLD: 0 K/UL — SIGNIFICANT CHANGE UP (ref 0–0)
NRBC # FLD: 0 K/UL — SIGNIFICANT CHANGE UP (ref 0–0)
OSMOLALITY UR: 494 MOSMO/KG — SIGNIFICANT CHANGE UP (ref 50–1200)
PHOSPHATE SERPL-MCNC: 3.5 MG/DL — SIGNIFICANT CHANGE UP (ref 2.5–4.5)
PLATELET # BLD AUTO: 241 K/UL — SIGNIFICANT CHANGE UP (ref 150–400)
PLATELET # BLD AUTO: 241 K/UL — SIGNIFICANT CHANGE UP (ref 150–400)
PMV BLD: 10.6 FL — SIGNIFICANT CHANGE UP (ref 7–13)
PMV BLD: 10.6 FL — SIGNIFICANT CHANGE UP (ref 7–13)
POTASSIUM SERPL-MCNC: 4.3 MMOL/L — SIGNIFICANT CHANGE UP (ref 3.5–5.3)
POTASSIUM SERPL-MCNC: 4.3 MMOL/L — SIGNIFICANT CHANGE UP (ref 3.5–5.3)
POTASSIUM SERPL-SCNC: 4.3 MMOL/L — SIGNIFICANT CHANGE UP (ref 3.5–5.3)
POTASSIUM SERPL-SCNC: 4.3 MMOL/L — SIGNIFICANT CHANGE UP (ref 3.5–5.3)
PROT SERPL-MCNC: 8.3 G/DL — SIGNIFICANT CHANGE UP (ref 6–8.3)
RBC # BLD: 3.59 M/UL — LOW (ref 3.8–5.2)
RBC # BLD: 3.59 M/UL — LOW (ref 3.8–5.2)
RBC # FLD: 12.7 % — SIGNIFICANT CHANGE UP (ref 10.3–14.5)
RBC # FLD: 12.7 % — SIGNIFICANT CHANGE UP (ref 10.3–14.5)
SODIUM SERPL-SCNC: 141 MMOL/L — SIGNIFICANT CHANGE UP (ref 135–145)
SODIUM SERPL-SCNC: 141 MMOL/L — SIGNIFICANT CHANGE UP (ref 135–145)
SODIUM UR-SCNC: 144 MMOL/L — SIGNIFICANT CHANGE UP
SPECIMEN SOURCE: SIGNIFICANT CHANGE UP
WBC # BLD: 9.91 K/UL — SIGNIFICANT CHANGE UP (ref 3.8–10.5)
WBC # BLD: 9.91 K/UL — SIGNIFICANT CHANGE UP (ref 3.8–10.5)
WBC # FLD AUTO: 9.91 K/UL — SIGNIFICANT CHANGE UP (ref 3.8–10.5)
WBC # FLD AUTO: 9.91 K/UL — SIGNIFICANT CHANGE UP (ref 3.8–10.5)

## 2020-01-23 PROCEDURE — 76770 US EXAM ABDO BACK WALL COMP: CPT | Mod: 26

## 2020-01-23 PROCEDURE — 95819 EEG AWAKE AND ASLEEP: CPT | Mod: 26

## 2020-01-23 PROCEDURE — 70450 CT HEAD/BRAIN W/O DYE: CPT | Mod: 26

## 2020-01-23 RX ORDER — CEFTRIAXONE 500 MG/1
1000 INJECTION, POWDER, FOR SOLUTION INTRAMUSCULAR; INTRAVENOUS EVERY 24 HOURS
Refills: 0 | Status: DISCONTINUED | OUTPATIENT
Start: 2020-01-23 | End: 2020-01-29

## 2020-01-23 RX ORDER — MAGNESIUM SULFATE 500 MG/ML
1 VIAL (ML) INJECTION ONCE
Refills: 0 | Status: COMPLETED | OUTPATIENT
Start: 2020-01-23 | End: 2020-01-23

## 2020-01-23 RX ORDER — SODIUM CHLORIDE 9 MG/ML
1000 INJECTION INTRAMUSCULAR; INTRAVENOUS; SUBCUTANEOUS
Refills: 0 | Status: DISCONTINUED | OUTPATIENT
Start: 2020-01-23 | End: 2020-01-25

## 2020-01-23 RX ADMIN — AMLODIPINE BESYLATE 10 MILLIGRAM(S): 2.5 TABLET ORAL at 06:50

## 2020-01-23 RX ADMIN — SODIUM CHLORIDE 75 MILLILITER(S): 9 INJECTION INTRAMUSCULAR; INTRAVENOUS; SUBCUTANEOUS at 18:13

## 2020-01-23 RX ADMIN — SODIUM CHLORIDE 60 MILLILITER(S): 9 INJECTION INTRAMUSCULAR; INTRAVENOUS; SUBCUTANEOUS at 00:37

## 2020-01-23 RX ADMIN — Medication 100 GRAM(S): at 15:37

## 2020-01-23 RX ADMIN — Medication 1: at 08:29

## 2020-01-23 RX ADMIN — HEPARIN SODIUM 5000 UNIT(S): 5000 INJECTION INTRAVENOUS; SUBCUTANEOUS at 06:50

## 2020-01-23 RX ADMIN — MEMANTINE HYDROCHLORIDE 10 MILLIGRAM(S): 10 TABLET ORAL at 15:37

## 2020-01-23 RX ADMIN — HEPARIN SODIUM 5000 UNIT(S): 5000 INJECTION INTRAVENOUS; SUBCUTANEOUS at 00:26

## 2020-01-23 RX ADMIN — HALOPERIDOL DECANOATE 0.5 MILLIGRAM(S): 100 INJECTION INTRAMUSCULAR at 00:24

## 2020-01-23 RX ADMIN — Medication 1 TABLET(S): at 15:37

## 2020-01-23 RX ADMIN — CEFTRIAXONE 100 MILLIGRAM(S): 500 INJECTION, POWDER, FOR SOLUTION INTRAMUSCULAR; INTRAVENOUS at 18:11

## 2020-01-23 RX ADMIN — Medication 12.5 MILLIGRAM(S): at 18:11

## 2020-01-23 RX ADMIN — HEPARIN SODIUM 5000 UNIT(S): 5000 INJECTION INTRAVENOUS; SUBCUTANEOUS at 22:11

## 2020-01-23 RX ADMIN — Medication 1: at 17:55

## 2020-01-23 RX ADMIN — Medication 81 MILLIGRAM(S): at 15:37

## 2020-01-23 RX ADMIN — HEPARIN SODIUM 5000 UNIT(S): 5000 INJECTION INTRAVENOUS; SUBCUTANEOUS at 15:37

## 2020-01-23 RX ADMIN — SODIUM CHLORIDE 75 MILLILITER(S): 9 INJECTION INTRAMUSCULAR; INTRAVENOUS; SUBCUTANEOUS at 22:10

## 2020-01-23 RX ADMIN — SODIUM CHLORIDE 60 MILLILITER(S): 9 INJECTION INTRAMUSCULAR; INTRAVENOUS; SUBCUTANEOUS at 06:50

## 2020-01-23 RX ADMIN — Medication 12.5 MILLIGRAM(S): at 06:50

## 2020-01-23 NOTE — PROGRESS NOTE ADULT - SUBJECTIVE AND OBJECTIVE BOX
[facility]      Seen earlier today, and examined.  - Today, patient is without complaints.           *****MEDICATIONS: Current medication reviewed and documented.    MEDICATIONS  (STANDING):  amLODIPine   Tablet 10 milliGRAM(s) Oral daily  aspirin enteric coated 81 milliGRAM(s) Oral daily  cefTRIAXone   IVPB 1000 milliGRAM(s) IV Intermittent every 24 hours  dextrose 5%. 1000 milliLiter(s) (50 mL/Hr) IV Continuous <Continuous>  dextrose 50% Injectable 12.5 Gram(s) IV Push once  dextrose 50% Injectable 25 Gram(s) IV Push once  dextrose 50% Injectable 25 Gram(s) IV Push once  heparin  Injectable 5000 Unit(s) SubCutaneous every 8 hours  insulin lispro (HumaLOG) corrective regimen sliding scale   SubCutaneous three times a day before meals  insulin lispro (HumaLOG) corrective regimen sliding scale   SubCutaneous at bedtime  memantine 10 milliGRAM(s) Oral daily  metoprolol tartrate 12.5 milliGRAM(s) Oral two times a day  multivitamin 1 Tablet(s) Oral daily  sodium chloride 0.9%. 1000 milliLiter(s) (75 mL/Hr) IV Continuous <Continuous>  sodium chloride 0.9%. 1000 milliLiter(s) (60 mL/Hr) IV Continuous <Continuous>    MEDICATIONS  (PRN):  bisacodyl 5 milliGRAM(s) Oral daily PRN Constipation  dextrose 40% Gel 15 Gram(s) Oral once PRN Blood Glucose LESS THAN 70 milliGRAM(s)/deciliter  glucagon  Injectable 1 milliGRAM(s) IntraMuscular once PRN Glucose LESS THAN 70 milligrams/deciliter  meclizine 25 milliGRAM(s) Oral two times a day PRN Dizziness  ondansetron Injectable 4 milliGRAM(s) IV Push every 6 hours PRN Nausea  senna 2 Tablet(s) Oral at bedtime PRN Constipation          ***** VITAL SIGNS:  T(F): 97.7 (20 @ 20:29), Max: 98.7 (20 @ 00:22)  HR: 84 (20 @ 20:29) (69 - 93)  BP: 120/88 (20 @ 20:29) (120/88 - 180/85)  RR: 17 (20 @ 20:29) (16 - 19)  SpO2: 98% (20 @ 20:29) (96% - 99%)  Wt(kg): --  ,   I&O's Summary    2020 07:01  -  2020 07:00  --------------------------------------------------------  IN: 0 mL / OUT: 500 mL / NET: -500 mL    2020 07:01  -  2020 22:34  --------------------------------------------------------  IN: 118 mL / OUT: 1450 mL / NET: -1332 mL             *****PHYSICAL EXAM: lethargic not arousable              *****LAB AND IMAGIN.5   9.91  )-----------( 241      ( 2020 06:48 )             35.6                   141  |  104  |  26<H>  ----------------------------<  146<H>  4.3   |  26  |  2.09<H>    Ca    10.7<H>      2020 06:48  Phos  3.5       Mg     1.5         TPro  8.3  /  Alb  4.5  /  TBili  0.6  /  DBili  x   /  AST  24  /  ALT  16  /  AlkPhos  75                         Urinalysis Basic - ( 2020 03:20 )    Color: YELLOW / Appearance: CLEAR / S.019 / pH: 6.0  Gluc: NEGATIVE / Ketone: NEGATIVE  / Bili: NEGATIVE / Urobili: NORMAL   Blood: NEGATIVE / Protein: 30 / Nitrite: NEGATIVE   Leuk Esterase: SMALL / RBC: 0-2 / WBC 6-10   Sq Epi: FEW / Non Sq Epi: x / Bacteria: NEGATIVE      [All pertinent recent Imaging/Reports reviewed]           *****A S S E S S M E N T   A N D   P L A N :     Excerpt from H&P  89 Creole speaking female, h/o dementia, DM2, HTN,  presents from aide for evaluation of AMS. Patient is a poor historian, AAOx2 at baseline and hard of hearing. Unable to obtain history. Patient denies symptoms. She reports that she was on a train [place] and is unable to recall how she ended up at the hospital. Unable to reach family members for collateral information.   Currently, only has mild back pain, denies any CP or SOB.         limited exam as pt is not cooperative.  Problem/Recommendations 1 Agitation likely sundowning due to dementia  will regulate sleep wake cycle  r/o infectious process      Problem/Recommendations 2: lethargy   stroke team was called who eval pt and discontinued the call as son at bedside reported that pts is at baseline   avoid any sedation   ct head unremarkable   eeg did not reveal any sz   Thank you for allowing me to participate in the care of this patient. Please do not hesitate to call me if you have any  questions.        ________________  Stormy Chavira MD  [facility] ([facility]  [phone]      33 minutes spent on total encounter; more than 50 % of the visit was  spent counseling about plan of care, compliance to diet/exercise and medication regimen and or  coordinating care by the attending physician.      It is advised that stroke patients follow up with DELILAH Callahan @ [phone] in 1- 2 weeks.   Others please follow up with Dr. Michael Nissenbaum [phone]

## 2020-01-23 NOTE — EEG REPORT - NS EEG TEXT BOX
[facility]   COMPREHENSIVE EPILEPSY CENTER   REPORT OF ROUTINE VIDEO EEG     [facility]: [address], [place] [address], Ph#: [phone]  [facility]: [address], [place] [address], Ph#: [phone]  [facility]: [address] [place] [address], Ph#: [phone]    Patient Name: BRIDGER ALLAN  Age and : 89y ([birth year])  MRN #: [id number]  Location: [place] [address] A  Referring Physician: Suleman Garrett    Study Date: 20    _____________________________________________________________  TECHNICAL INFORMATION    Placement and Labeling of Electrodes:  The EEG was performed utilizing 20 channels referential EEG connections (coronal over temporal over parasagittal montage) using all standard 10-20 electrode placements with EKG.  Recording was at a sampling rate of 256 samples per second per channel.  Time synchronized digital video recording was done simultaneously with EEG recording.  A low light infrared camera was used for low light recording.  Virgilio and seizure detection algorithms were utilized.    _____________________________________________________________  HISTORY    Patient is a 89y old  Female who presents with a chief complaint of     PERTINENT MEDICATION:  none    _____________________________________________________________  STUDY INTERPRETATION    Findings: The background was continuous, spontaneously variable and reactive. During wakefulness, the posterior dominant rhythm consisted of symmetric, poorly-modulated 7-8 Hz activity, with amplitude to 30 uV, that attenuated to eye opening.      Background Slowing:  None were present.    Focal Slowing:   None were present.    Sleep Background:  Drowsiness was characterized by fragmentation, attenuation, and slowing of the background activity.    Sleep was characterized by the presence of symmetric, rudimentary sleep spindles and K-complexes.    Other Non-Epileptiform Findings:  None were present.    Interictal Epileptiform Activity:   None were present.    Events:  Clinical events: None recorded.  Seizures: None recorded.    Activation Procedures:   Hyperventilation was not performed.    Photic stimulation was performed and did not elicit any abnormality.     Artifacts:  Intermittent myogenic and movement artifacts were noted.    ECG:  The heart rate on single channel ECG was predominantly between 60-70 BPM.    _____________________________________________________________  EEG SUMMARY/CLASSIFICATION    Abnormal EEG in the awake, drowsy and asleep states.  - Mild generalized slowing.    _____________________________________________________________  EEG IMPRESSION/CLINICAL CORRELATE    Abnormal EEG study.  1. Mild nonspecific diffuse or multifocal cerebral dysfunction.   2. No epileptiform pattern or seizure seen.    _____________________________________________________________    Ramon Griffith MD  Attending Physician, [facility]

## 2020-01-23 NOTE — PHYSICAL THERAPY INITIAL EVALUATION ADULT - THERAPY FREQUENCY, PT EVAL
patient not appropriate for skilled PT services at this time, non verbal, unable to follow commands. will re attempt when appropriate, as schedule permits

## 2020-01-23 NOTE — STROKE CODE NOTE - ABSOLUTE EXCLUSION OTHER CRITERIA
Code stroke completed without CT because patient adx for AMS and nurse who called the code didn't have a good baseline of patient

## 2020-01-23 NOTE — PROGRESS NOTE ADULT - ASSESSMENT
89 Creole speaking female, h/o dementia, DM2, HTN,  presented from aide for evaluation of AMS. Renal consulted for MAGALIE.     MAGALIE on CKD 3 b/l Cr 1.1-1.3 07/2019  MAGALIE pre-renal azotemia vs post renal  clinically euvolemic    HTN-BP high initially, now better  r/o UTI- agree w/CTX iv. f/u UCx  DM- Mx per primary team    labs, rad, chart reviewed  check bladder scan for PVR/bladder volume  start IVF-NS @60ml/hr  if RFT worsens or if PVR high, obtain renal sono  low Na in diet  d/c home metformin  resume home bp meds  monitor BMP daily and u/o qshift  dose all meds for eGFR<15ml/min.   avoid ACEi/ARB/NSAIDs/Nephrotoxics.  Thanks for consulting. will closely f/u.

## 2020-01-23 NOTE — PROGRESS NOTE ADULT - SUBJECTIVE AND OBJECTIVE BOX
Patient is a 89y old  Female who presents with a chief complaint of     pt. seen and examined, s/p code stroke   pt. is lethargic and difficult to arouse     INTERVAL HPI/OVERNIGHT EVENTS:  T(C): 36.5 (20 @ 20:29), Max: 36.8 (20 @ 06:39)  HR: 84 (20 @ 20:29) (69 - 90)  BP: 120/88 (20 @ 20:29) (120/88 - 180/85)  RR: 17 (20 @ 20:29) (16 - 19)  SpO2: 98% (20 @ 20:29) (98% - 99%)  Wt(kg): --  I&O's Summary    2020 07:  -  2020 07:00  --------------------------------------------------------  IN: 0 mL / OUT: 500 mL / NET: -500 mL    2020 07:01  -  2020 01:39  --------------------------------------------------------  IN: 118 mL / OUT: 1450 mL / NET: -1332 mL        PAST MEDICAL & SURGICAL HISTORY:  Obesity  Anemia  Dementia  OA (osteoarthritis)  Chronic low back pain  Shingles  DM (diabetes mellitus)  HTN (hypertension)  S/P hysterectomy with oophorectomy  Uterine fibroid      SOCIAL HISTORY  Alcohol:  Tobacco:  Illicit substance use:    FAMILY HISTORY:    REVIEW OF SYSTEMS:  CONSTITUTIONAL: No fever, weight loss, or fatigue  EYES: No eye pain, visual disturbances, or discharge  ENMT:  No difficulty hearing, tinnitus, vertigo; No sinus or throat pain  NECK: No pain or stiffness  RESPIRATORY: No cough, wheezing, chills or hemoptysis; No shortness of breath  CARDIOVASCULAR: No chest pain, palpitations, dizziness, or leg swelling  GASTROINTESTINAL: No abdominal or epigastric pain. No nausea, vomiting, or hematemesis; No diarrhea or constipation. No melena or hematochezia.  GENITOURINARY: No dysuria, frequency, hematuria, or incontinence  NEUROLOGICAL: No headaches, memory loss, loss of strength, numbness, or tremors  SKIN: No itching, burning, rashes, or lesions   LYMPH NODES: No enlarged glands  ENDOCRINE: No heat or cold intolerance; No hair loss  MUSCULOSKELETAL: No joint pain or swelling; No muscle, back, or extremity pain  PSYCHIATRIC: No depression, anxiety, mood swings, or difficulty sleeping  HEME/LYMPH: No easy bruising, or bleeding gums  ALLERY AND IMMUNOLOGIC: No hives or eczema    RADIOLOGY & ADDITIONAL TESTS:    Imaging Personally Reviewed:  [ ] YES  [ ] NO    Consultant(s) Notes Reviewed:  [ ] YES  [ ] NO    PHYSICAL EXAM:  GENERAL: NAD, well-groomed, well-developed  HEAD:  Atraumatic, Normocephalic  EYES: EOMI, PERRLA, conjunctiva and sclera clear  ENMT: No tonsillar erythema, exudates, or enlargement; Moist mucous membranes, Good dentition, No lesions  NECK: Supple, No JVD, Normal thyroid  NERVOUS SYSTEM:  Alert & Oriented X3, Good concentration; Motor Strength 5/5 B/L upper and lower extremities; DTRs 2+ intact and symmetric  CHEST/LUNG: Clear to percussion bilaterally; No rales, rhonchi, wheezing, or rubs  HEART: Regular rate and rhythm; No murmurs, rubs, or gallops  ABDOMEN: Soft, Nontender, Nondistended; Bowel sounds present  EXTREMITIES:  2+ Peripheral Pulses, No clubbing, cyanosis, or edema  LYMPH: No lymphadenopathy noted  SKIN: No rashes or lesions    LABS:                        11.5   9.91  )-----------( 241      ( 2020 06:48 )             35.6         141  |  104  |  26<H>  ----------------------------<  146<H>  4.3   |  26  |  2.09<H>    Ca    10.7<H>      2020 06:48  Phos  3.5       Mg     1.5         TPro  8.3  /  Alb  4.5  /  TBili  0.6  /  DBili  x   /  AST  24  /  ALT  16  /  AlkPhos  75        Urinalysis Basic - ( 2020 03:20 )    Color: YELLOW / Appearance: CLEAR / S.019 / pH: 6.0  Gluc: NEGATIVE / Ketone: NEGATIVE  / Bili: NEGATIVE / Urobili: NORMAL   Blood: NEGATIVE / Protein: 30 / Nitrite: NEGATIVE   Leuk Esterase: SMALL / RBC: 0-2 / WBC 6-10   Sq Epi: FEW / Non Sq Epi: x / Bacteria: NEGATIVE      CAPILLARY BLOOD GLUCOSE      POCT Blood Glucose.: 105 mg/dL (2020 21:07)  POCT Blood Glucose.: 151 mg/dL (2020 16:55)  POCT Blood Glucose.: 116 mg/dL (2020 11:42)  POCT Blood Glucose.: 194 mg/dL (2020 07:56)        Urinalysis Basic - ( 2020 03:20 )    Color: YELLOW / Appearance: CLEAR / S.019 / pH: 6.0  Gluc: NEGATIVE / Ketone: NEGATIVE  / Bili: NEGATIVE / Urobili: NORMAL   Blood: NEGATIVE / Protein: 30 / Nitrite: NEGATIVE   Leuk Esterase: SMALL / RBC: 0-2 / WBC 6-10   Sq Epi: FEW / Non Sq Epi: x / Bacteria: NEGATIVE        MEDICATIONS  (STANDING):  amLODIPine   Tablet 10 milliGRAM(s) Oral daily  aspirin enteric coated 81 milliGRAM(s) Oral daily  cefTRIAXone   IVPB 1000 milliGRAM(s) IV Intermittent every 24 hours  dextrose 5%. 1000 milliLiter(s) (50 mL/Hr) IV Continuous <Continuous>  dextrose 50% Injectable 12.5 Gram(s) IV Push once  dextrose 50% Injectable 25 Gram(s) IV Push once  dextrose 50% Injectable 25 Gram(s) IV Push once  heparin  Injectable 5000 Unit(s) SubCutaneous every 8 hours  insulin lispro (HumaLOG) corrective regimen sliding scale   SubCutaneous three times a day before meals  insulin lispro (HumaLOG) corrective regimen sliding scale   SubCutaneous at bedtime  memantine 10 milliGRAM(s) Oral daily  metoprolol tartrate 12.5 milliGRAM(s) Oral two times a day  multivitamin 1 Tablet(s) Oral daily  sodium chloride 0.9%. 1000 milliLiter(s) (75 mL/Hr) IV Continuous <Continuous>  sodium chloride 0.9%. 1000 milliLiter(s) (60 mL/Hr) IV Continuous <Continuous>    MEDICATIONS  (PRN):  bisacodyl 5 milliGRAM(s) Oral daily PRN Constipation  dextrose 40% Gel 15 Gram(s) Oral once PRN Blood Glucose LESS THAN 70 milliGRAM(s)/deciliter  glucagon  Injectable 1 milliGRAM(s) IntraMuscular once PRN Glucose LESS THAN 70 milligrams/deciliter  meclizine 25 milliGRAM(s) Oral two times a day PRN Dizziness  ondansetron Injectable 4 milliGRAM(s) IV Push every 6 hours PRN Nausea  senna 2 Tablet(s) Oral at bedtime PRN Constipation      Care Discussed with Consultants/Other Providers [ ] YES  [ ] NO

## 2020-01-23 NOTE — CHART NOTE - NSCHARTNOTEFT_GEN_A_CORE
Patient seen this am . difficult to arouse initially, only to painful stimuli . son at bedside to translate.   Patient opens eyes to sternal rub . son at bedside noting change in facial symmetry . Patient appears to have left sided facial droop, tongue deviated to left side.   minimal range of motion of all extremities. Vital signs stable ,   CODE STROKE CALLED -  last seen baseline unknown at this time .   D/w Neurology attending - will check non contrast CT head, EEG at this time , will avoid haldol for now ( last dose at 12 am )  , and give seroquel if needed 12.5mg for agitation

## 2020-01-23 NOTE — PHYSICAL THERAPY INITIAL EVALUATION ADULT - DISCHARGE DISPOSITION, PT EVAL
patient not appropriate for skilled PT services at this time, non verbal, unable to follow commands. will re attempt when appropriate, as schedule permits/no skilled PT needs

## 2020-01-23 NOTE — PROGRESS NOTE ADULT - PROBLEM SELECTOR PLAN 1
etiology unclear  worsening dementia?  -today s/p code stroke   -d/c haldol   -seen by neurology  -CT head neg

## 2020-01-23 NOTE — PROGRESS NOTE ADULT - SUBJECTIVE AND OBJECTIVE BOX
Pt seen and examined at bedside  non-communicative, even with son  at bedside  as per Nurses, not eating  Had retention of Urine, Had Straight Cath  750 cc drained      Allergies:  No Known Allergies    Hospital Medications:   MEDICATIONS  (STANDING):  amLODIPine   Tablet 10 milliGRAM(s) Oral daily  aspirin enteric coated 81 milliGRAM(s) Oral daily  dextrose 5%. 1000 milliLiter(s) (50 mL/Hr) IV Continuous <Continuous>  dextrose 50% Injectable 12.5 Gram(s) IV Push once  dextrose 50% Injectable 25 Gram(s) IV Push once  dextrose 50% Injectable 25 Gram(s) IV Push once  heparin  Injectable 5000 Unit(s) SubCutaneous every 8 hours  insulin lispro (HumaLOG) corrective regimen sliding scale   SubCutaneous three times a day before meals  insulin lispro (HumaLOG) corrective regimen sliding scale   SubCutaneous at bedtime  magnesium sulfate  IVPB 1 Gram(s) IV Intermittent once  memantine 10 milliGRAM(s) Oral daily  metoprolol tartrate 12.5 milliGRAM(s) Oral two times a day  multivitamin 1 Tablet(s) Oral daily  sodium chloride 0.9%. 1000 milliLiter(s) (60 mL/Hr) IV Continuous <Continuous>    REVIEW OF SYSTEMS:  unable to obtain    VITALS:  T(F): 98.3 (20 @ 06:39), Max: 98.7 (20 @ 00:22)  HR: 88 (20 @ 06:39)  BP: 180/85 (20 @ 06:39)  RR: 19 (20 @ 06:39)  SpO2: 98% (20 @ 06:39)  Wt(kg): --     @ 07:  -   @ 07:00  --------------------------------------------------------  IN: 0 mL / OUT: 500 mL / NET: -500 mL     @ 07:  -   @ 11:49  --------------------------------------------------------  IN: 0 mL / OUT: 700 mL / NET: -700 mL      Height (cm): 162.6 ( @ 00:22)  Weight (kg): 75.6 ( @ 19:58)  BMI (kg/m2): 28.6 ( @ 00:22)  BSA (m2): 1.81 ( @ 00:22)  PHYSICAL EXAM:  Constitutional: NAD  HEENT: anicteric sclera, oropharynx clear, MMM  Neck: No JVD  Respiratory: CTAB, no wheezes, rales or rhonchi  Cardiovascular: S1, S2, RRR  Gastrointestinal: BS+, soft, NT/ND  Extremities: No cyanosis or clubbing. No peripheral edema  Neurological: unable to assess     : No CVA tenderness. No murcia.   Skin: No rashes      LABS:      141  |  104  |  26<H>  ----------------------------<  146<H>  4.3   |  26  |  2.09<H>    Ca    10.7<H>      2020 06:48  Phos  3.5       Mg     1.5         TPro  8.3  /  Alb  4.5  /  TBili  0.6  /  DBili      /  AST  24  /  ALT  16  /  AlkPhos  75      Creatinine Trend: 2.09 <--, 2.27 <--, 2.05 <--                        11.5   9.91  )-----------( 241      ( 2020 06:48 )             35.6     Urine Studies:  Urinalysis Basic - ( 2020 03:20 )    Color: YELLOW / Appearance: CLEAR / S.019 / pH: 6.0  Gluc: NEGATIVE / Ketone: NEGATIVE  / Bili: NEGATIVE / Urobili: NORMAL   Blood: NEGATIVE / Protein: 30 / Nitrite: NEGATIVE   Leuk Esterase: SMALL / RBC: 0-2 / WBC 6-10   Sq Epi: FEW / Non Sq Epi:  / Bacteria: NEGATIVE        RADIOLOGY & ADDITIONAL STUDIES:

## 2020-01-23 NOTE — PROGRESS NOTE ADULT - PROBLEM SELECTOR PLAN 1
Cr very slightly dec, 2.27 to 2.0.  Lytes ok. inc josesito ?. no evidence of fluid oveload, eating poorly  Urine retention, required Straight cather  PLan  cont IV NS 75 cc hrly.  Get Renal Sono, r/o obstruction. May need [place]  check Urine Na,osmo,Cl  daily BMP  Discussed with PA

## 2020-01-24 DIAGNOSIS — Z71.89 OTHER SPECIFIED COUNSELING: ICD-10-CM

## 2020-01-24 LAB
ANION GAP SERPL CALC-SCNC: 14 MMO/L — SIGNIFICANT CHANGE UP (ref 7–14)
BUN SERPL-MCNC: 20 MG/DL — SIGNIFICANT CHANGE UP (ref 7–23)
CALCIUM SERPL-MCNC: 10.2 MG/DL — SIGNIFICANT CHANGE UP (ref 8.4–10.5)
CHLORIDE SERPL-SCNC: 105 MMOL/L — SIGNIFICANT CHANGE UP (ref 98–107)
CO2 SERPL-SCNC: 21 MMOL/L — LOW (ref 22–31)
CREAT SERPL-MCNC: 1.49 MG/DL — HIGH (ref 0.5–1.3)
GLUCOSE SERPL-MCNC: 134 MG/DL — HIGH (ref 70–99)
HCT VFR BLD CALC: 37.8 % — SIGNIFICANT CHANGE UP (ref 34.5–45)
HGB BLD-MCNC: 12.1 G/DL — SIGNIFICANT CHANGE UP (ref 11.5–15.5)
MCHC RBC-ENTMCNC: 31.9 PG — SIGNIFICANT CHANGE UP (ref 27–34)
MCHC RBC-ENTMCNC: 32 % — SIGNIFICANT CHANGE UP (ref 32–36)
MCV RBC AUTO: 99.7 FL — SIGNIFICANT CHANGE UP (ref 80–100)
NRBC # FLD: 0 K/UL — SIGNIFICANT CHANGE UP (ref 0–0)
PLATELET # BLD AUTO: 233 K/UL — SIGNIFICANT CHANGE UP (ref 150–400)
PMV BLD: 10.9 FL — SIGNIFICANT CHANGE UP (ref 7–13)
POTASSIUM SERPL-MCNC: 4.4 MMOL/L — SIGNIFICANT CHANGE UP (ref 3.5–5.3)
POTASSIUM SERPL-SCNC: 4.4 MMOL/L — SIGNIFICANT CHANGE UP (ref 3.5–5.3)
RBC # BLD: 3.79 M/UL — LOW (ref 3.8–5.2)
RBC # FLD: 12.6 % — SIGNIFICANT CHANGE UP (ref 10.3–14.5)
SODIUM SERPL-SCNC: 140 MMOL/L — SIGNIFICANT CHANGE UP (ref 135–145)
WBC # BLD: 8.25 K/UL — SIGNIFICANT CHANGE UP (ref 3.8–10.5)
WBC # FLD AUTO: 8.25 K/UL — SIGNIFICANT CHANGE UP (ref 3.8–10.5)

## 2020-01-24 RX ORDER — QUETIAPINE FUMARATE 200 MG/1
12.5 TABLET, FILM COATED ORAL ONCE
Refills: 0 | Status: COMPLETED | OUTPATIENT
Start: 2020-01-24 | End: 2020-01-24

## 2020-01-24 RX ADMIN — CEFTRIAXONE 100 MILLIGRAM(S): 500 INJECTION, POWDER, FOR SOLUTION INTRAMUSCULAR; INTRAVENOUS at 17:06

## 2020-01-24 RX ADMIN — Medication 1: at 17:08

## 2020-01-24 RX ADMIN — Medication 1 TABLET(S): at 15:25

## 2020-01-24 RX ADMIN — MEMANTINE HYDROCHLORIDE 10 MILLIGRAM(S): 10 TABLET ORAL at 15:25

## 2020-01-24 RX ADMIN — SODIUM CHLORIDE 75 MILLILITER(S): 9 INJECTION INTRAMUSCULAR; INTRAVENOUS; SUBCUTANEOUS at 05:18

## 2020-01-24 RX ADMIN — QUETIAPINE FUMARATE 12.5 MILLIGRAM(S): 200 TABLET, FILM COATED ORAL at 18:59

## 2020-01-24 RX ADMIN — Medication 81 MILLIGRAM(S): at 15:25

## 2020-01-24 RX ADMIN — Medication 12.5 MILLIGRAM(S): at 17:07

## 2020-01-24 RX ADMIN — Medication 12.5 MILLIGRAM(S): at 05:18

## 2020-01-24 RX ADMIN — HEPARIN SODIUM 5000 UNIT(S): 5000 INJECTION INTRAVENOUS; SUBCUTANEOUS at 21:38

## 2020-01-24 RX ADMIN — AMLODIPINE BESYLATE 10 MILLIGRAM(S): 2.5 TABLET ORAL at 05:18

## 2020-01-24 RX ADMIN — HEPARIN SODIUM 5000 UNIT(S): 5000 INJECTION INTRAVENOUS; SUBCUTANEOUS at 05:18

## 2020-01-24 RX ADMIN — Medication 1: at 12:54

## 2020-01-24 RX ADMIN — HEPARIN SODIUM 5000 UNIT(S): 5000 INJECTION INTRAVENOUS; SUBCUTANEOUS at 15:25

## 2020-01-24 NOTE — PROGRESS NOTE ADULT - PROBLEM SELECTOR PLAN 1
Cr improving, 2.27 to 2.0 >1.4   Lytes ok. no evidence of fluid oveload, eating poorly  Urine retention, s/p Straight cather, now w/indwelling murcia. u.o good  s/p IV NS 75 cc hrly. watch off IVF  daily BMP

## 2020-01-24 NOTE — CHART NOTE - NSCHARTNOTEFT_GEN_A_CORE
Patient undergoing straight cath protocol was straight cathed x 3.   Repeat Bladder scan 436 ml - pt also incontinent - Murcia ordered for urinary retention  (pt was straight cathed 3x prior to murcia insertion)

## 2020-01-24 NOTE — PROGRESS NOTE ADULT - ASSESSMENT
89 Creole speaking female, h/o dementia, DM2, HTN,  presented from aide for evaluation of AMS. Renal following for MAGALIE.     MAGALIE on CKD 3 b/l Cr 1.1-1.3 07/2019  MAGALIE pre-renal azotemia vs post renal  clinically euvolemic  no hydro on renal sono    HTN-BP now better  DM- Mx per primary team    labs, chart reviewed  low Na in diet  off metformin  resume home bp meds  monitor BMP daily and u/o qshift  dose all meds for eGFR<15ml/min.   avoid ACEi/ARB/NSAIDs/Nephrotoxics.  will closely f/u.

## 2020-01-24 NOTE — PROGRESS NOTE ADULT - SUBJECTIVE AND OBJECTIVE BOX
[facility] - Lexie / Salima WORTHY /Oliver/ ZAYNAB Coughlin/ ZAYNAB Hidalgo/ Johnson Livingston / JACOB Njeru  ---------------------------------------------------------------------------------------------------------------    Patient seen and examined bedside    Subjective and Objective:     Allergies: No Known Allergies      Hospital Medications:   MEDICATIONS  (STANDING):  amLODIPine   Tablet 10 milliGRAM(s) Oral daily  aspirin enteric coated 81 milliGRAM(s) Oral daily  cefTRIAXone   IVPB 1000 milliGRAM(s) IV Intermittent every 24 hours  dextrose 5%. 1000 milliLiter(s) (50 mL/Hr) IV Continuous <Continuous>  dextrose 50% Injectable 12.5 Gram(s) IV Push once  dextrose 50% Injectable 25 Gram(s) IV Push once  dextrose 50% Injectable 25 Gram(s) IV Push once  heparin  Injectable 5000 Unit(s) SubCutaneous every 8 hours  insulin lispro (HumaLOG) corrective regimen sliding scale   SubCutaneous three times a day before meals  insulin lispro (HumaLOG) corrective regimen sliding scale   SubCutaneous at bedtime  memantine 10 milliGRAM(s) Oral daily  metoprolol tartrate 12.5 milliGRAM(s) Oral two times a day  multivitamin 1 Tablet(s) Oral daily  sodium chloride 0.9%. 1000 milliLiter(s) (75 mL/Hr) IV Continuous <Continuous>  sodium chloride 0.9%. 1000 milliLiter(s) (60 mL/Hr) IV Continuous <Continuous>      VITALS:  T(F): 98.4 (20 @ 04:29), Max: 98.4 (20 @ 04:29)  HR: 87 (20 @ 04:29)  BP: 156/82 (20 @ 04:29)  RR: 17 (20 @ 04:29)  SpO2: 98% (20 @ 04:29)  Wt(kg): --     @ 07: @ 07:00  --------------------------------------------------------  IN: 1193 mL / OUT: 2050 mL / NET: -857 mL     @ 07: @ 12:16  --------------------------------------------------------  IN: 358 mL / OUT: 0 mL / NET: 358 mL      PHYSICAL EXAM:  Constitutional: NAD  HEENT: anicteric sclera, oropharynx clear  Neck: No JVD  Respiratory: CTAB, no wheezes, rales or rhonchi  Cardiovascular: S1, S2, RRR  Gastrointestinal: BS+, soft, NT/ND  Extremities: No cyanosis or clubbing. No peripheral edema  Neurological: A/O x 2, no focal deficits  Psychiatric: Normal mood, normal affect  : + murcia.       LABS:      140  |  105  |  20  ----------------------------<  134<H>  4.4   |  21<L>  |  1.49<H>    Ca    10.2      2020 06:45  Phos  3.5       Mg     1.5         TPro  8.3  /  Alb  4.5  /  TBili  0.6  /  DBili      /  AST  24  /  ALT  16  /  AlkPhos  75      Creatinine Trend: 1.49 <--, 2.09 <--, 2.27 <--, 2.05 <--                        12.1   8.25  )-----------( 233      ( 2020 06:45 )             37.8     Urine Studies:  Urinalysis Basic - ( 2020 03:20 )    Color: YELLOW / Appearance: CLEAR / S.019 / pH: 6.0  Gluc: NEGATIVE / Ketone: NEGATIVE  / Bili: NEGATIVE / Urobili: NORMAL   Blood: NEGATIVE / Protein: 30 / Nitrite: NEGATIVE   Leuk Esterase: SMALL / RBC: 0-2 / WBC 6-10   Sq Epi: FEW / Non Sq Epi:  / Bacteria: NEGATIVE      Sodium, Random Urine: 144 mmol/L ( @ 18:07)  Chloride, Random Urine: 124 mmol/L ( @ 18:07)  Osmolality, Random Urine: 494 mosmo/kg ( @ 18:07)      RADIOLOGY & ADDITIONAL STUDIES: [facility] - Lexie / Salima WORTHY /Oliver/ ZAYNAB Coughlin/ ZAYNAB Hidalgo/ Johnson Livingston / JACOB Njeru  ---------------------------------------------------------------------------------------------------------------    Patient seen and examined bedside    Subjective and Objective:     Allergies: No Known Allergies      Hospital Medications:   MEDICATIONS  (STANDING):  amLODIPine   Tablet 10 milliGRAM(s) Oral daily  aspirin enteric coated 81 milliGRAM(s) Oral daily  cefTRIAXone   IVPB 1000 milliGRAM(s) IV Intermittent every 24 hours  dextrose 5%. 1000 milliLiter(s) (50 mL/Hr) IV Continuous <Continuous>  dextrose 50% Injectable 12.5 Gram(s) IV Push once  dextrose 50% Injectable 25 Gram(s) IV Push once  dextrose 50% Injectable 25 Gram(s) IV Push once  heparin  Injectable 5000 Unit(s) SubCutaneous every 8 hours  insulin lispro (HumaLOG) corrective regimen sliding scale   SubCutaneous three times a day before meals  insulin lispro (HumaLOG) corrective regimen sliding scale   SubCutaneous at bedtime  memantine 10 milliGRAM(s) Oral daily  metoprolol tartrate 12.5 milliGRAM(s) Oral two times a day  multivitamin 1 Tablet(s) Oral daily  sodium chloride 0.9%. 1000 milliLiter(s) (75 mL/Hr) IV Continuous <Continuous>  sodium chloride 0.9%. 1000 milliLiter(s) (60 mL/Hr) IV Continuous <Continuous>      VITALS:  T(F): 98.4 (20 @ 04:29), Max: 98.4 (20 @ 04:29)  HR: 87 (20 @ 04:29)  BP: 156/82 (20 @ 04:29)  RR: 17 (20 @ 04:29)  SpO2: 98% (20 @ 04:29)  Wt(kg): --     @ 07: @ 07:00  --------------------------------------------------------  IN: 1193 mL / OUT: 2050 mL / NET: -857 mL     @ 07: @ 12:16  --------------------------------------------------------  IN: 358 mL / OUT: 0 mL / NET: 358 mL      PHYSICAL EXAM:  Constitutional: NAD  HEENT: anicteric sclera, oropharynx clear  Neck: No JVD  Respiratory: CTAB, no wheezes, rales or rhonchi  Cardiovascular: S1, S2, RRR  Gastrointestinal: BS+, soft, NT, obese  Extremities: No cyanosis or clubbing. No peripheral edema  Neurological: A/O x2  Psychiatric: Normal mood, normal affect  : + murcia.       LABS:      140  |  105  |  20  ----------------------------<  134<H>  4.4   |  21<L>  |  1.49<H>    Ca    10.2      2020 06:45  Phos  3.5       Mg     1.5         TPro  8.3  /  Alb  4.5  /  TBili  0.6  /  DBili      /  AST  24  /  ALT  16  /  AlkPhos  75      Creatinine Trend: 1.49 <--, 2.09 <--, 2.27 <--, 2.05 <--                        12.1   8.25  )-----------( 233      ( 2020 06:45 )             37.8     Urine Studies:  Urinalysis Basic - ( 2020 03:20 )    Color: YELLOW / Appearance: CLEAR / S.019 / pH: 6.0  Gluc: NEGATIVE / Ketone: NEGATIVE  / Bili: NEGATIVE / Urobili: NORMAL   Blood: NEGATIVE / Protein: 30 / Nitrite: NEGATIVE   Leuk Esterase: SMALL / RBC: 0-2 / WBC 6-10   Sq Epi: FEW / Non Sq Epi:  / Bacteria: NEGATIVE      Sodium, Random Urine: 144 mmol/L ( @ 18:07)  Chloride, Random Urine: 124 mmol/L ( @ 18:07)  Osmolality, Random Urine: 494 mosmo/kg ( @ 18:07)      RADIOLOGY & ADDITIONAL STUDIES:

## 2020-01-24 NOTE — PROGRESS NOTE ADULT - PROBLEM SELECTOR PLAN 1
etiology unclear  worsening dementia  - s/p code stroke   -d/c haldol   -seen by neurology  -CT head neg

## 2020-01-24 NOTE — PROGRESS NOTE ADULT - SUBJECTIVE AND OBJECTIVE BOX
Patient is a 89y old  Female who presents with a chief complaint of     INTERVAL HPI/OVERNIGHT EVENTS:  T(C): 37 (01-24-20 @ 20:24), Max: 37 (01-24-20 @ 20:24)  HR: 121 (01-24-20 @ 20:24) (85 - 121)  BP: 171/96 (01-24-20 @ 20:24) (144/76 - 171/96)  RR: 16 (01-24-20 @ 20:24) (16 - 17)  SpO2: 96% (01-24-20 @ 20:24) (96% - 99%)  Wt(kg): --  I&O's Summary    23 Jan 2020 07:01  -  24 Jan 2020 07:00  --------------------------------------------------------  IN: 1193 mL / OUT: 2050 mL / NET: -857 mL    24 Jan 2020 07:01  -  24 Jan 2020 21:54  --------------------------------------------------------  IN: 358 mL / OUT: 800 mL / NET: -442 mL        PAST MEDICAL & SURGICAL HISTORY:  Obesity  Anemia  Dementia  OA (osteoarthritis)  Chronic low back pain  Shingles  DM (diabetes mellitus)  HTN (hypertension)  S/P hysterectomy with oophorectomy  Uterine fibroid      SOCIAL HISTORY  Alcohol:  Tobacco:  Illicit substance use:    FAMILY HISTORY:    REVIEW OF SYSTEMS:  CONSTITUTIONAL: No fever, weight loss, or fatigue  EYES: No eye pain, visual disturbances, or discharge  ENMT:  No difficulty hearing, tinnitus, vertigo; No sinus or throat pain  NECK: No pain or stiffness  RESPIRATORY: No cough, wheezing, chills or hemoptysis; No shortness of breath  CARDIOVASCULAR: No chest pain, palpitations, dizziness, or leg swelling  GASTROINTESTINAL: No abdominal or epigastric pain. No nausea, vomiting, or hematemesis; No diarrhea or constipation. No melena or hematochezia.  GENITOURINARY: No dysuria, frequency, hematuria, or incontinence  NEUROLOGICAL: No headaches, memory loss, loss of strength, numbness, or tremors  SKIN: No itching, burning, rashes, or lesions   LYMPH NODES: No enlarged glands  ENDOCRINE: No heat or cold intolerance; No hair loss  MUSCULOSKELETAL: No joint pain or swelling; No muscle, back, or extremity pain  PSYCHIATRIC: No depression, anxiety, mood swings, or difficulty sleeping  HEME/LYMPH: No easy bruising, or bleeding gums  ALLERY AND IMMUNOLOGIC: No hives or eczema    RADIOLOGY & ADDITIONAL TESTS:    Imaging Personally Reviewed:  [ ] YES  [ ] NO    Consultant(s) Notes Reviewed:  [ ] YES  [ ] NO    PHYSICAL EXAM:  GENERAL: NAD, well-groomed, well-developed  HEAD:  Atraumatic, Normocephalic  EYES: EOMI, PERRLA, conjunctiva and sclera clear  ENMT: No tonsillar erythema, exudates, or enlargement; Moist mucous membranes, Good dentition, No lesions  NECK: Supple, No JVD, Normal thyroid  NERVOUS SYSTEM:  Alert & Oriented X3, Good concentration; Motor Strength 5/5 B/L upper and lower extremities; DTRs 2+ intact and symmetric  CHEST/LUNG: Clear to percussion bilaterally; No rales, rhonchi, wheezing, or rubs  HEART: Regular rate and rhythm; No murmurs, rubs, or gallops  ABDOMEN: Soft, Nontender, Nondistended; Bowel sounds present  EXTREMITIES:  2+ Peripheral Pulses, No clubbing, cyanosis, or edema  LYMPH: No lymphadenopathy noted  SKIN: No rashes or lesions    LABS:                        12.1   8.25  )-----------( 233      ( 24 Jan 2020 06:45 )             37.8     01-24    140  |  105  |  20  ----------------------------<  134<H>  4.4   |  21<L>  |  1.49<H>    Ca    10.2      24 Jan 2020 06:45  Phos  3.5     01-23  Mg     1.5     01-23    TPro  8.3  /  Alb  4.5  /  TBili  0.6  /  DBili  x   /  AST  24  /  ALT  16  /  AlkPhos  75  01-23        CAPILLARY BLOOD GLUCOSE      POCT Blood Glucose.: 205 mg/dL (24 Jan 2020 21:32)  POCT Blood Glucose.: 155 mg/dL (24 Jan 2020 16:56)  POCT Blood Glucose.: 170 mg/dL (24 Jan 2020 11:58)  POCT Blood Glucose.: 115 mg/dL (24 Jan 2020 07:53)            MEDICATIONS  (STANDING):  amLODIPine   Tablet 10 milliGRAM(s) Oral daily  aspirin enteric coated 81 milliGRAM(s) Oral daily  cefTRIAXone   IVPB 1000 milliGRAM(s) IV Intermittent every 24 hours  dextrose 5%. 1000 milliLiter(s) (50 mL/Hr) IV Continuous <Continuous>  dextrose 50% Injectable 12.5 Gram(s) IV Push once  dextrose 50% Injectable 25 Gram(s) IV Push once  dextrose 50% Injectable 25 Gram(s) IV Push once  heparin  Injectable 5000 Unit(s) SubCutaneous every 8 hours  insulin lispro (HumaLOG) corrective regimen sliding scale   SubCutaneous three times a day before meals  insulin lispro (HumaLOG) corrective regimen sliding scale   SubCutaneous at bedtime  memantine 10 milliGRAM(s) Oral daily  metoprolol tartrate 12.5 milliGRAM(s) Oral two times a day  multivitamin 1 Tablet(s) Oral daily  sodium chloride 0.9%. 1000 milliLiter(s) (60 mL/Hr) IV Continuous <Continuous>  sodium chloride 0.9%. 1000 milliLiter(s) (75 mL/Hr) IV Continuous <Continuous>    MEDICATIONS  (PRN):  bisacodyl 5 milliGRAM(s) Oral daily PRN Constipation  dextrose 40% Gel 15 Gram(s) Oral once PRN Blood Glucose LESS THAN 70 milliGRAM(s)/deciliter  glucagon  Injectable 1 milliGRAM(s) IntraMuscular once PRN Glucose LESS THAN 70 milligrams/deciliter  meclizine 25 milliGRAM(s) Oral two times a day PRN Dizziness  ondansetron Injectable 4 milliGRAM(s) IV Push every 6 hours PRN Nausea  senna 2 Tablet(s) Oral at bedtime PRN Constipation      Care Discussed with Consultants/Other Providers [ ] YES  [ ] NO Patient is a 89y old  Female who presents with a chief complaint of     pt. was combative and agitation , rece'd seroquel   s/p murcia     INTERVAL HPI/OVERNIGHT EVENTS:  T(C): 37 (01-24-20 @ 20:24), Max: 37 (01-24-20 @ 20:24)  HR: 121 (01-24-20 @ 20:24) (85 - 121)  BP: 171/96 (01-24-20 @ 20:24) (144/76 - 171/96)  RR: 16 (01-24-20 @ 20:24) (16 - 17)  SpO2: 96% (01-24-20 @ 20:24) (96% - 99%)  Wt(kg): --  I&O's Summary    23 Jan 2020 07:01  -  24 Jan 2020 07:00  --------------------------------------------------------  IN: 1193 mL / OUT: 2050 mL / NET: -857 mL    24 Jan 2020 07:01  -  24 Jan 2020 21:54  --------------------------------------------------------  IN: 358 mL / OUT: 800 mL / NET: -442 mL        PAST MEDICAL & SURGICAL HISTORY:  Obesity  Anemia  Dementia  OA (osteoarthritis)  Chronic low back pain  Shingles  DM (diabetes mellitus)  HTN (hypertension)  S/P hysterectomy with oophorectomy  Uterine fibroid      SOCIAL HISTORY  Alcohol:  Tobacco:  Illicit substance use:    FAMILY HISTORY:    REVIEW OF SYSTEMS:  CONSTITUTIONAL: No fever, weight loss, or fatigue  EYES: No eye pain, visual disturbances, or discharge  ENMT:  No difficulty hearing, tinnitus, vertigo; No sinus or throat pain  NECK: No pain or stiffness  RESPIRATORY: No cough, wheezing, chills or hemoptysis; No shortness of breath  CARDIOVASCULAR: No chest pain, palpitations, dizziness, or leg swelling  GASTROINTESTINAL: No abdominal or epigastric pain. No nausea, vomiting, or hematemesis; No diarrhea or constipation. No melena or hematochezia.  GENITOURINARY: No dysuria, frequency, hematuria, or incontinence  NEUROLOGICAL: No headaches, memory loss, loss of strength, numbness, or tremors  SKIN: No itching, burning, rashes, or lesions   LYMPH NODES: No enlarged glands  ENDOCRINE: No heat or cold intolerance; No hair loss  MUSCULOSKELETAL: No joint pain or swelling; No muscle, back, or extremity pain  PSYCHIATRIC: No depression, anxiety, mood swings, or difficulty sleeping  HEME/LYMPH: No easy bruising, or bleeding gums  ALLERY AND IMMUNOLOGIC: No hives or eczema    RADIOLOGY & ADDITIONAL TESTS:    Imaging Personally Reviewed:  [ ] YES  [ ] NO    Consultant(s) Notes Reviewed:  [ ] YES  [ ] NO    PHYSICAL EXAM:  GENERAL: NAD, well-groomed, well-developed  HEAD:  Atraumatic, Normocephalic  EYES: EOMI, PERRLA, conjunctiva and sclera clear  ENMT: No tonsillar erythema, exudates, or enlargement; Moist mucous membranes, Good dentition, No lesions  NECK: Supple, No JVD, Normal thyroid  NERVOUS SYSTEM:  Alert & Oriented X3, Good concentration; Motor Strength 5/5 B/L upper and lower extremities; DTRs 2+ intact and symmetric  CHEST/LUNG: Clear to percussion bilaterally; No rales, rhonchi, wheezing, or rubs  HEART: Regular rate and rhythm; No murmurs, rubs, or gallops  ABDOMEN: Soft, Nontender, Nondistended; Bowel sounds present  EXTREMITIES:  2+ Peripheral Pulses, No clubbing, cyanosis, or edema  LYMPH: No lymphadenopathy noted  SKIN: No rashes or lesions    LABS:                        12.1   8.25  )-----------( 233      ( 24 Jan 2020 06:45 )             37.8     01-24    140  |  105  |  20  ----------------------------<  134<H>  4.4   |  21<L>  |  1.49<H>    Ca    10.2      24 Jan 2020 06:45  Phos  3.5     01-23  Mg     1.5     01-23    TPro  8.3  /  Alb  4.5  /  TBili  0.6  /  DBili  x   /  AST  24  /  ALT  16  /  AlkPhos  75  01-23        CAPILLARY BLOOD GLUCOSE      POCT Blood Glucose.: 205 mg/dL (24 Jan 2020 21:32)  POCT Blood Glucose.: 155 mg/dL (24 Jan 2020 16:56)  POCT Blood Glucose.: 170 mg/dL (24 Jan 2020 11:58)  POCT Blood Glucose.: 115 mg/dL (24 Jan 2020 07:53)            MEDICATIONS  (STANDING):  amLODIPine   Tablet 10 milliGRAM(s) Oral daily  aspirin enteric coated 81 milliGRAM(s) Oral daily  cefTRIAXone   IVPB 1000 milliGRAM(s) IV Intermittent every 24 hours  dextrose 5%. 1000 milliLiter(s) (50 mL/Hr) IV Continuous <Continuous>  dextrose 50% Injectable 12.5 Gram(s) IV Push once  dextrose 50% Injectable 25 Gram(s) IV Push once  dextrose 50% Injectable 25 Gram(s) IV Push once  heparin  Injectable 5000 Unit(s) SubCutaneous every 8 hours  insulin lispro (HumaLOG) corrective regimen sliding scale   SubCutaneous three times a day before meals  insulin lispro (HumaLOG) corrective regimen sliding scale   SubCutaneous at bedtime  memantine 10 milliGRAM(s) Oral daily  metoprolol tartrate 12.5 milliGRAM(s) Oral two times a day  multivitamin 1 Tablet(s) Oral daily  sodium chloride 0.9%. 1000 milliLiter(s) (60 mL/Hr) IV Continuous <Continuous>  sodium chloride 0.9%. 1000 milliLiter(s) (75 mL/Hr) IV Continuous <Continuous>    MEDICATIONS  (PRN):  bisacodyl 5 milliGRAM(s) Oral daily PRN Constipation  dextrose 40% Gel 15 Gram(s) Oral once PRN Blood Glucose LESS THAN 70 milliGRAM(s)/deciliter  glucagon  Injectable 1 milliGRAM(s) IntraMuscular once PRN Glucose LESS THAN 70 milligrams/deciliter  meclizine 25 milliGRAM(s) Oral two times a day PRN Dizziness  ondansetron Injectable 4 milliGRAM(s) IV Push every 6 hours PRN Nausea  senna 2 Tablet(s) Oral at bedtime PRN Constipation      Care Discussed with Consultants/Other Providers [ ] YES  [ ] NO

## 2020-01-24 NOTE — CHART NOTE - NSCHARTNOTEFT_GEN_A_CORE
Patient redirected multiple times by staff now unsuccessful . Patient combative . will give seroquel x 1

## 2020-01-25 LAB
ANION GAP SERPL CALC-SCNC: 14 MMO/L — SIGNIFICANT CHANGE UP (ref 7–14)
BUN SERPL-MCNC: 22 MG/DL — SIGNIFICANT CHANGE UP (ref 7–23)
CALCIUM SERPL-MCNC: 9.7 MG/DL — SIGNIFICANT CHANGE UP (ref 8.4–10.5)
CHLORIDE SERPL-SCNC: 110 MMOL/L — HIGH (ref 98–107)
CO2 SERPL-SCNC: 18 MMOL/L — LOW (ref 22–31)
CREAT SERPL-MCNC: 1.5 MG/DL — HIGH (ref 0.5–1.3)
GLUCOSE SERPL-MCNC: 135 MG/DL — HIGH (ref 70–99)
HCT VFR BLD CALC: 35.1 % — SIGNIFICANT CHANGE UP (ref 34.5–45)
HGB BLD-MCNC: 11.1 G/DL — LOW (ref 11.5–15.5)
MCHC RBC-ENTMCNC: 31.6 % — LOW (ref 32–36)
MCHC RBC-ENTMCNC: 32.2 PG — SIGNIFICANT CHANGE UP (ref 27–34)
MCV RBC AUTO: 101.7 FL — HIGH (ref 80–100)
NRBC # FLD: 0 K/UL — SIGNIFICANT CHANGE UP (ref 0–0)
PLATELET # BLD AUTO: 186 K/UL — SIGNIFICANT CHANGE UP (ref 150–400)
PMV BLD: 11.2 FL — SIGNIFICANT CHANGE UP (ref 7–13)
POTASSIUM SERPL-MCNC: 4.2 MMOL/L — SIGNIFICANT CHANGE UP (ref 3.5–5.3)
POTASSIUM SERPL-SCNC: 4.2 MMOL/L — SIGNIFICANT CHANGE UP (ref 3.5–5.3)
RBC # BLD: 3.45 M/UL — LOW (ref 3.8–5.2)
RBC # FLD: 12.7 % — SIGNIFICANT CHANGE UP (ref 10.3–14.5)
SODIUM SERPL-SCNC: 142 MMOL/L — SIGNIFICANT CHANGE UP (ref 135–145)
WBC # BLD: 7.96 K/UL — SIGNIFICANT CHANGE UP (ref 3.8–10.5)
WBC # FLD AUTO: 7.96 K/UL — SIGNIFICANT CHANGE UP (ref 3.8–10.5)

## 2020-01-25 RX ORDER — ACETAMINOPHEN 500 MG
650 TABLET ORAL EVERY 6 HOURS
Refills: 0 | Status: DISCONTINUED | OUTPATIENT
Start: 2020-01-25 | End: 2020-02-13

## 2020-01-25 RX ORDER — OLANZAPINE 15 MG/1
1.25 TABLET, FILM COATED ORAL ONCE
Refills: 0 | Status: COMPLETED | OUTPATIENT
Start: 2020-01-25 | End: 2020-01-25

## 2020-01-25 RX ORDER — LANOLIN ALCOHOL/MO/W.PET/CERES
3 CREAM (GRAM) TOPICAL ONCE
Refills: 0 | Status: COMPLETED | OUTPATIENT
Start: 2020-01-25 | End: 2020-01-25

## 2020-01-25 RX ORDER — QUETIAPINE FUMARATE 200 MG/1
12.5 TABLET, FILM COATED ORAL ONCE
Refills: 0 | Status: COMPLETED | OUTPATIENT
Start: 2020-01-25 | End: 2020-01-25

## 2020-01-25 RX ADMIN — OLANZAPINE 1.25 MILLIGRAM(S): 15 TABLET, FILM COATED ORAL at 01:20

## 2020-01-25 RX ADMIN — Medication 650 MILLIGRAM(S): at 22:00

## 2020-01-25 RX ADMIN — Medication 650 MILLIGRAM(S): at 11:50

## 2020-01-25 RX ADMIN — Medication 2: at 17:31

## 2020-01-25 RX ADMIN — AMLODIPINE BESYLATE 10 MILLIGRAM(S): 2.5 TABLET ORAL at 13:23

## 2020-01-25 RX ADMIN — HEPARIN SODIUM 5000 UNIT(S): 5000 INJECTION INTRAVENOUS; SUBCUTANEOUS at 05:19

## 2020-01-25 RX ADMIN — HEPARIN SODIUM 5000 UNIT(S): 5000 INJECTION INTRAVENOUS; SUBCUTANEOUS at 21:29

## 2020-01-25 RX ADMIN — CEFTRIAXONE 100 MILLIGRAM(S): 500 INJECTION, POWDER, FOR SOLUTION INTRAMUSCULAR; INTRAVENOUS at 17:31

## 2020-01-25 RX ADMIN — Medication 1 TABLET(S): at 13:18

## 2020-01-25 RX ADMIN — Medication 650 MILLIGRAM(S): at 21:30

## 2020-01-25 RX ADMIN — Medication 650 MILLIGRAM(S): at 12:40

## 2020-01-25 RX ADMIN — Medication 12.5 MILLIGRAM(S): at 17:32

## 2020-01-25 RX ADMIN — HEPARIN SODIUM 5000 UNIT(S): 5000 INJECTION INTRAVENOUS; SUBCUTANEOUS at 13:18

## 2020-01-25 RX ADMIN — MEMANTINE HYDROCHLORIDE 10 MILLIGRAM(S): 10 TABLET ORAL at 13:18

## 2020-01-25 RX ADMIN — Medication 81 MILLIGRAM(S): at 13:18

## 2020-01-25 NOTE — PROGRESS NOTE ADULT - SUBJECTIVE AND OBJECTIVE BOX
[facility] - Lexie / Salima WORTHY /Oliver/ ZAYNAB Coughlin/ ZAYNAB Hidalgo/ Johnson Livingston / JACOB Njeru  ---------------------------------------------------------------------------------------------------------------    Patient seen and examined bedside    Subjective and Objective: No overnight events. offers no complaints today.    Allergies: No Known Allergies      Hospital Medications:   MEDICATIONS  (STANDING):  amLODIPine   Tablet 10 milliGRAM(s) Oral daily  aspirin enteric coated 81 milliGRAM(s) Oral daily  cefTRIAXone   IVPB 1000 milliGRAM(s) IV Intermittent every 24 hours  dextrose 5%. 1000 milliLiter(s) (50 mL/Hr) IV Continuous <Continuous>  dextrose 50% Injectable 12.5 Gram(s) IV Push once  dextrose 50% Injectable 25 Gram(s) IV Push once  dextrose 50% Injectable 25 Gram(s) IV Push once  heparin  Injectable 5000 Unit(s) SubCutaneous every 8 hours  insulin lispro (HumaLOG) corrective regimen sliding scale   SubCutaneous three times a day before meals  insulin lispro (HumaLOG) corrective regimen sliding scale   SubCutaneous at bedtime  memantine 10 milliGRAM(s) Oral daily  metoprolol tartrate 12.5 milliGRAM(s) Oral two times a day  multivitamin 1 Tablet(s) Oral daily  sodium chloride 0.9%. 1000 milliLiter(s) (60 mL/Hr) IV Continuous <Continuous>  sodium chloride 0.9%. 1000 milliLiter(s) (75 mL/Hr) IV Continuous <Continuous>      VITALS:  T(F): 97.6 (20 @ 12:55), Max: 98.6 (20 @ 20:24)  HR: 77 (20 @ 17:36)  BP: 129/68 (20 @ 17:36)  RR: 17 (20 @ 12:55)  SpO2: 97% (20 @ 12:55)  Wt(kg): --     @ 07: @ 07:00  --------------------------------------------------------  IN: 1108 mL / OUT: 2400 mL / NET: -1292 mL     @ 07: @ 19:14  --------------------------------------------------------  IN: 1161 mL / OUT: 700 mL / NET: 461 mL      PHYSICAL EXAM:  Constitutional: NAD  HEENT: anicteric sclera, oropharynx clear  Neck: No JVD  Respiratory: CTAB, no wheezes, rales or rhonchi  Cardiovascular: S1, S2, RRR  Gastrointestinal: BS+, soft, NT  Extremities: No cyanosis or clubbing. No peripheral edema  Neurological: A/O x 1  : +murcia.       LABS:      142  |  110<H>  |  22  ----------------------------<  135<H>  4.2   |  18<L>  |  1.50<H>    Ca    9.7      2020 07:00      Creatinine Trend: 1.50 <--, 1.49 <--, 2.09 <--, 2.27 <--, 2.05 <--                        11.1   7.96  )-----------( 186      ( 2020 07:00 )             35.1     Urine Studies:  Urinalysis Basic - ( 2020 03:20 )    Color: YELLOW / Appearance: CLEAR / S.019 / pH: 6.0  Gluc: NEGATIVE / Ketone: NEGATIVE  / Bili: NEGATIVE / Urobili: NORMAL   Blood: NEGATIVE / Protein: 30 / Nitrite: NEGATIVE   Leuk Esterase: SMALL / RBC: 0-2 / WBC 6-10   Sq Epi: FEW / Non Sq Epi:  / Bacteria: NEGATIVE      Sodium, Random Urine: 144 mmol/L ( @ 18:07)  Chloride, Random Urine: 124 mmol/L ( @ 18:07)  Osmolality, Random Urine: 494 mosmo/kg ( @ 18:07)      RADIOLOGY & ADDITIONAL STUDIES:

## 2020-01-25 NOTE — PROGRESS NOTE ADULT - SUBJECTIVE AND OBJECTIVE BOX
Patient is a 89y old  Female who presents with a chief complaint of   pt. seen and examined    INTERVAL HPI/OVERNIGHT EVENTS:  T(C): 36.3 (01-25-20 @ 20:50), Max: 36.4 (01-25-20 @ 12:55)  HR: 75 (01-25-20 @ 20:50) (71 - 86)  BP: 134/76 (01-25-20 @ 20:50) (129/68 - 168/76)  RR: 16 (01-25-20 @ 20:50) (16 - 17)  SpO2: 96% (01-25-20 @ 20:50) (96% - 97%)  Wt(kg): --  I&O's Summary    24 Jan 2020 07:01  -  25 Jan 2020 07:00  --------------------------------------------------------  IN: 1108 mL / OUT: 2400 mL / NET: -1292 mL    25 Jan 2020 07:01  -  25 Jan 2020 21:39  --------------------------------------------------------  IN: 1161 mL / OUT: 700 mL / NET: 461 mL        PAST MEDICAL & SURGICAL HISTORY:  Obesity  Anemia  Dementia  OA (osteoarthritis)  Chronic low back pain  Shingles  DM (diabetes mellitus)  HTN (hypertension)  S/P hysterectomy with oophorectomy  Uterine fibroid      SOCIAL HISTORY  Alcohol:  Tobacco:  Illicit substance use:    FAMILY HISTORY:    REVIEW OF SYSTEMS:  CONSTITUTIONAL: No fever, weight loss, or fatigue  EYES: No eye pain, visual disturbances, or discharge  ENMT:  No difficulty hearing, tinnitus, vertigo; No sinus or throat pain  NECK: No pain or stiffness  RESPIRATORY: No cough, wheezing, chills or hemoptysis; No shortness of breath  CARDIOVASCULAR: No chest pain, palpitations, dizziness, or leg swelling  GASTROINTESTINAL: No abdominal or epigastric pain. No nausea, vomiting, or hematemesis; No diarrhea or constipation. No melena or hematochezia.  GENITOURINARY: No dysuria, frequency, hematuria, or incontinence  NEUROLOGICAL: No headaches, memory loss, loss of strength, numbness, or tremors  SKIN: No itching, burning, rashes, or lesions   LYMPH NODES: No enlarged glands  ENDOCRINE: No heat or cold intolerance; No hair loss  MUSCULOSKELETAL: No joint pain or swelling; No muscle, back, or extremity pain  PSYCHIATRIC: No depression, anxiety, mood swings, or difficulty sleeping  HEME/LYMPH: No easy bruising, or bleeding gums  ALLERY AND IMMUNOLOGIC: No hives or eczema    RADIOLOGY & ADDITIONAL TESTS:    Imaging Personally Reviewed:  [ ] YES  [ ] NO    Consultant(s) Notes Reviewed:  [ ] YES  [ ] NO    PHYSICAL EXAM:  GENERAL: NAD, well-groomed, well-developed  HEAD:  Atraumatic, Normocephalic  EYES: EOMI, PERRLA, conjunctiva and sclera clear  ENMT: No tonsillar erythema, exudates, or enlargement; Moist mucous membranes, Good dentition, No lesions  NECK: Supple, No JVD, Normal thyroid  NERVOUS SYSTEM:  Alert & Oriented X3, Good concentration; Motor Strength 5/5 B/L upper and lower extremities; DTRs 2+ intact and symmetric  CHEST/LUNG: Clear to percussion bilaterally; No rales, rhonchi, wheezing, or rubs  HEART: Regular rate and rhythm; No murmurs, rubs, or gallops  ABDOMEN: Soft, Nontender, Nondistended; Bowel sounds present  EXTREMITIES:  2+ Peripheral Pulses, No clubbing, cyanosis, or edema  LYMPH: No lymphadenopathy noted  SKIN: No rashes or lesions    LABS:                        11.1   7.96  )-----------( 186      ( 25 Jan 2020 07:00 )             35.1     01-25    142  |  110<H>  |  22  ----------------------------<  135<H>  4.2   |  18<L>  |  1.50<H>    Ca    9.7      25 Jan 2020 07:00          CAPILLARY BLOOD GLUCOSE      POCT Blood Glucose.: 105 mg/dL (25 Jan 2020 21:10)  POCT Blood Glucose.: 208 mg/dL (25 Jan 2020 17:08)  POCT Blood Glucose.: 135 mg/dL (25 Jan 2020 11:36)  POCT Blood Glucose.: 131 mg/dL (25 Jan 2020 07:58)            MEDICATIONS  (STANDING):  amLODIPine   Tablet 10 milliGRAM(s) Oral daily  aspirin enteric coated 81 milliGRAM(s) Oral daily  cefTRIAXone   IVPB 1000 milliGRAM(s) IV Intermittent every 24 hours  dextrose 5%. 1000 milliLiter(s) (50 mL/Hr) IV Continuous <Continuous>  dextrose 50% Injectable 12.5 Gram(s) IV Push once  dextrose 50% Injectable 25 Gram(s) IV Push once  dextrose 50% Injectable 25 Gram(s) IV Push once  heparin  Injectable 5000 Unit(s) SubCutaneous every 8 hours  insulin lispro (HumaLOG) corrective regimen sliding scale   SubCutaneous three times a day before meals  insulin lispro (HumaLOG) corrective regimen sliding scale   SubCutaneous at bedtime  memantine 10 milliGRAM(s) Oral daily  metoprolol tartrate 12.5 milliGRAM(s) Oral two times a day  multivitamin 1 Tablet(s) Oral daily  sodium chloride 0.9%. 1000 milliLiter(s) (60 mL/Hr) IV Continuous <Continuous>    MEDICATIONS  (PRN):  acetaminophen   Tablet .. 650 milliGRAM(s) Oral every 6 hours PRN Temp greater or equal to 38C (100.4F), Mild Pain (1 - 3), Moderate Pain (4 - 6), Severe Pain (7 - 10)  bisacodyl 5 milliGRAM(s) Oral daily PRN Constipation  dextrose 40% Gel 15 Gram(s) Oral once PRN Blood Glucose LESS THAN 70 milliGRAM(s)/deciliter  glucagon  Injectable 1 milliGRAM(s) IntraMuscular once PRN Glucose LESS THAN 70 milligrams/deciliter  meclizine 25 milliGRAM(s) Oral two times a day PRN Dizziness  ondansetron Injectable 4 milliGRAM(s) IV Push every 6 hours PRN Nausea  senna 2 Tablet(s) Oral at bedtime PRN Constipation      Care Discussed with Consultants/Other Providers [ ] YES  [ ] NO

## 2020-01-25 NOTE — PROGRESS NOTE ADULT - ASSESSMENT
89 Creole speaking female, h/o dementia, DM2, HTN,  presented from [facility] for evaluation of AMS. Renal following for MAGALIE.     MAGALIE on CKD 3 b/l Cr 1.1-1.3 07/2019  MAGALIE pre-renal azotemia vs post renal  clinically euvolemic  no hydro on renal sono    HTN-BP now better  DM- Mx per primary team    labs, chart reviewed  low Na in diet  off metformin  monitor BMP daily and u/o qshift  dose all meds for eGFR<15ml/min.   avoid ACEi/ARB/NSAIDs/Nephrotoxics.  will closely f/u.

## 2020-01-25 NOTE — PROGRESS NOTE ADULT - PROBLEM SELECTOR PLAN 1
Cr improving, 2.27 to 2.0 >1.5>1.5 stable  Lytes ok. no evidence of fluid overload, eating poorly  Urine retention, s/p Straight catheter, now w/indwelling murcia. u.o good  s/p IV NS 75 cc hrly. d/c IVF. encourage po intake  daily BMP

## 2020-01-26 LAB
ANION GAP SERPL CALC-SCNC: 12 MMO/L — SIGNIFICANT CHANGE UP (ref 7–14)
BUN SERPL-MCNC: 20 MG/DL — SIGNIFICANT CHANGE UP (ref 7–23)
CALCIUM SERPL-MCNC: 9 MG/DL — SIGNIFICANT CHANGE UP (ref 8.4–10.5)
CHLORIDE SERPL-SCNC: 112 MMOL/L — HIGH (ref 98–107)
CO2 SERPL-SCNC: 19 MMOL/L — LOW (ref 22–31)
CREAT SERPL-MCNC: 1.47 MG/DL — HIGH (ref 0.5–1.3)
GLUCOSE SERPL-MCNC: 125 MG/DL — HIGH (ref 70–99)
HCT VFR BLD CALC: 34.4 % — LOW (ref 34.5–45)
HGB BLD-MCNC: 10.9 G/DL — LOW (ref 11.5–15.5)
MCHC RBC-ENTMCNC: 31.7 % — LOW (ref 32–36)
MCHC RBC-ENTMCNC: 32.4 PG — SIGNIFICANT CHANGE UP (ref 27–34)
MCV RBC AUTO: 102.4 FL — HIGH (ref 80–100)
NRBC # FLD: 0 K/UL — SIGNIFICANT CHANGE UP (ref 0–0)
PLATELET # BLD AUTO: 206 K/UL — SIGNIFICANT CHANGE UP (ref 150–400)
PMV BLD: 11.5 FL — SIGNIFICANT CHANGE UP (ref 7–13)
POTASSIUM SERPL-MCNC: 4.1 MMOL/L — SIGNIFICANT CHANGE UP (ref 3.5–5.3)
POTASSIUM SERPL-SCNC: 4.1 MMOL/L — SIGNIFICANT CHANGE UP (ref 3.5–5.3)
RBC # BLD: 3.36 M/UL — LOW (ref 3.8–5.2)
RBC # FLD: 13 % — SIGNIFICANT CHANGE UP (ref 10.3–14.5)
SODIUM SERPL-SCNC: 143 MMOL/L — SIGNIFICANT CHANGE UP (ref 135–145)
WBC # BLD: 7.36 K/UL — SIGNIFICANT CHANGE UP (ref 3.8–10.5)
WBC # FLD AUTO: 7.36 K/UL — SIGNIFICANT CHANGE UP (ref 3.8–10.5)

## 2020-01-26 RX ADMIN — Medication 650 MILLIGRAM(S): at 09:12

## 2020-01-26 RX ADMIN — HEPARIN SODIUM 5000 UNIT(S): 5000 INJECTION INTRAVENOUS; SUBCUTANEOUS at 13:55

## 2020-01-26 RX ADMIN — Medication 12.5 MILLIGRAM(S): at 06:11

## 2020-01-26 RX ADMIN — Medication 650 MILLIGRAM(S): at 10:07

## 2020-01-26 RX ADMIN — HEPARIN SODIUM 5000 UNIT(S): 5000 INJECTION INTRAVENOUS; SUBCUTANEOUS at 06:12

## 2020-01-26 RX ADMIN — AMLODIPINE BESYLATE 10 MILLIGRAM(S): 2.5 TABLET ORAL at 06:12

## 2020-01-26 RX ADMIN — Medication 1 TABLET(S): at 13:54

## 2020-01-26 RX ADMIN — Medication 1: at 17:25

## 2020-01-26 RX ADMIN — CEFTRIAXONE 100 MILLIGRAM(S): 500 INJECTION, POWDER, FOR SOLUTION INTRAMUSCULAR; INTRAVENOUS at 17:25

## 2020-01-26 RX ADMIN — HEPARIN SODIUM 5000 UNIT(S): 5000 INJECTION INTRAVENOUS; SUBCUTANEOUS at 21:10

## 2020-01-26 RX ADMIN — Medication 12.5 MILLIGRAM(S): at 17:22

## 2020-01-26 RX ADMIN — Medication 81 MILLIGRAM(S): at 13:54

## 2020-01-26 RX ADMIN — MEMANTINE HYDROCHLORIDE 10 MILLIGRAM(S): 10 TABLET ORAL at 13:54

## 2020-01-26 NOTE — PROGRESS NOTE ADULT - SUBJECTIVE AND OBJECTIVE BOX
Patient is a 89y old  Female who presents with a chief complaint of     pt. seen and examined, lethargic     INTERVAL HPI/OVERNIGHT EVENTS:  T(C): 36.8 (01-26-20 @ 12:50), Max: 36.8 (01-26-20 @ 12:50)  HR: 91 (01-26-20 @ 17:21) (75 - 91)  BP: 143/73 (01-26-20 @ 17:21) (132/75 - 143/73)  RR: 17 (01-26-20 @ 12:50) (16 - 17)  SpO2: 95% (01-26-20 @ 12:50) (95% - 96%)  Wt(kg): --  I&O's Summary    25 Jan 2020 07:01  -  26 Jan 2020 07:00  --------------------------------------------------------  IN: 1511 mL / OUT: 1700 mL / NET: -189 mL    26 Jan 2020 07:01  -  26 Jan 2020 18:30  --------------------------------------------------------  IN: 300 mL / OUT: 850 mL / NET: -550 mL        PAST MEDICAL & SURGICAL HISTORY:  Obesity  Anemia  Dementia  OA (osteoarthritis)  Chronic low back pain  Shingles  DM (diabetes mellitus)  HTN (hypertension)  S/P hysterectomy with oophorectomy  Uterine fibroid      SOCIAL HISTORY  Alcohol:  Tobacco:  Illicit substance use:    FAMILY HISTORY:    REVIEW OF SYSTEMS:  CONSTITUTIONAL: No fever, weight loss, or fatigue  EYES: No eye pain, visual disturbances, or discharge  ENMT:  No difficulty hearing, tinnitus, vertigo; No sinus or throat pain  NECK: No pain or stiffness  RESPIRATORY: No cough, wheezing, chills or hemoptysis; No shortness of breath  CARDIOVASCULAR: No chest pain, palpitations, dizziness, or leg swelling  GASTROINTESTINAL: No abdominal or epigastric pain. No nausea, vomiting, or hematemesis; No diarrhea or constipation. No melena or hematochezia.  GENITOURINARY: No dysuria, frequency, hematuria, or incontinence  NEUROLOGICAL: No headaches, memory loss, loss of strength, numbness, or tremors  SKIN: No itching, burning, rashes, or lesions   LYMPH NODES: No enlarged glands  ENDOCRINE: No heat or cold intolerance; No hair loss  MUSCULOSKELETAL: No joint pain or swelling; No muscle, back, or extremity pain  PSYCHIATRIC: No depression, anxiety, mood swings, or difficulty sleeping  HEME/LYMPH: No easy bruising, or bleeding gums  ALLERY AND IMMUNOLOGIC: No hives or eczema    RADIOLOGY & ADDITIONAL TESTS:    Imaging Personally Reviewed:  [ ] YES  [ ] NO    Consultant(s) Notes Reviewed:  [ ] YES  [ ] NO    PHYSICAL EXAM:  GENERAL: NAD, well-groomed, well-developed  HEAD:  Atraumatic, Normocephalic  EYES: EOMI, PERRLA, conjunctiva and sclera clear  ENMT: No tonsillar erythema, exudates, or enlargement; Moist mucous membranes, Good dentition, No lesions  NECK: Supple, No JVD, Normal thyroid  NERVOUS SYSTEM:  Alert & Oriented X3, Good concentration; Motor Strength 5/5 B/L upper and lower extremities; DTRs 2+ intact and symmetric  CHEST/LUNG: Clear to percussion bilaterally; No rales, rhonchi, wheezing, or rubs  HEART: Regular rate and rhythm; No murmurs, rubs, or gallops  ABDOMEN: Soft, Nontender, Nondistended; Bowel sounds present  EXTREMITIES:  2+ Peripheral Pulses, No clubbing, cyanosis, or edema  LYMPH: No lymphadenopathy noted  SKIN: No rashes or lesions    LABS:                        10.9   7.36  )-----------( 206      ( 26 Jan 2020 06:24 )             34.4     01-26    143  |  112<H>  |  20  ----------------------------<  125<H>  4.1   |  19<L>  |  1.47<H>    Ca    9.0      26 Jan 2020 06:24          CAPILLARY BLOOD GLUCOSE      POCT Blood Glucose.: 168 mg/dL (26 Jan 2020 17:03)  POCT Blood Glucose.: 144 mg/dL (26 Jan 2020 11:52)  POCT Blood Glucose.: 117 mg/dL (26 Jan 2020 07:37)  POCT Blood Glucose.: 105 mg/dL (25 Jan 2020 21:10)            MEDICATIONS  (STANDING):  amLODIPine   Tablet 10 milliGRAM(s) Oral daily  aspirin enteric coated 81 milliGRAM(s) Oral daily  cefTRIAXone   IVPB 1000 milliGRAM(s) IV Intermittent every 24 hours  dextrose 5%. 1000 milliLiter(s) (50 mL/Hr) IV Continuous <Continuous>  dextrose 50% Injectable 12.5 Gram(s) IV Push once  dextrose 50% Injectable 25 Gram(s) IV Push once  dextrose 50% Injectable 25 Gram(s) IV Push once  heparin  Injectable 5000 Unit(s) SubCutaneous every 8 hours  insulin lispro (HumaLOG) corrective regimen sliding scale   SubCutaneous three times a day before meals  insulin lispro (HumaLOG) corrective regimen sliding scale   SubCutaneous at bedtime  memantine 10 milliGRAM(s) Oral daily  metoprolol tartrate 12.5 milliGRAM(s) Oral two times a day  multivitamin 1 Tablet(s) Oral daily  sodium chloride 0.9%. 1000 milliLiter(s) (60 mL/Hr) IV Continuous <Continuous>    MEDICATIONS  (PRN):  acetaminophen   Tablet .. 650 milliGRAM(s) Oral every 6 hours PRN Temp greater or equal to 38C (100.4F), Mild Pain (1 - 3), Moderate Pain (4 - 6), Severe Pain (7 - 10)  bisacodyl 5 milliGRAM(s) Oral daily PRN Constipation  dextrose 40% Gel 15 Gram(s) Oral once PRN Blood Glucose LESS THAN 70 milliGRAM(s)/deciliter  glucagon  Injectable 1 milliGRAM(s) IntraMuscular once PRN Glucose LESS THAN 70 milligrams/deciliter  meclizine 25 milliGRAM(s) Oral two times a day PRN Dizziness  ondansetron Injectable 4 milliGRAM(s) IV Push every 6 hours PRN Nausea  senna 2 Tablet(s) Oral at bedtime PRN Constipation      Care Discussed with Consultants/Other Providers [ ] YES  [ ] NO

## 2020-01-27 DIAGNOSIS — R53.1 WEAKNESS: ICD-10-CM

## 2020-01-27 DIAGNOSIS — Z51.5 ENCOUNTER FOR PALLIATIVE CARE: ICD-10-CM

## 2020-01-27 LAB
ANION GAP SERPL CALC-SCNC: 11 MMO/L — SIGNIFICANT CHANGE UP (ref 7–14)
BUN SERPL-MCNC: 24 MG/DL — HIGH (ref 7–23)
CALCIUM SERPL-MCNC: 9.1 MG/DL — SIGNIFICANT CHANGE UP (ref 8.4–10.5)
CHLORIDE SERPL-SCNC: 110 MMOL/L — HIGH (ref 98–107)
CO2 SERPL-SCNC: 17 MMOL/L — LOW (ref 22–31)
CREAT SERPL-MCNC: 1.48 MG/DL — HIGH (ref 0.5–1.3)
GLUCOSE SERPL-MCNC: 134 MG/DL — HIGH (ref 70–99)
HCT VFR BLD CALC: 31.9 % — LOW (ref 34.5–45)
HGB BLD-MCNC: 10.1 G/DL — LOW (ref 11.5–15.5)
MCHC RBC-ENTMCNC: 31.7 % — LOW (ref 32–36)
MCHC RBC-ENTMCNC: 32.5 PG — SIGNIFICANT CHANGE UP (ref 27–34)
MCV RBC AUTO: 102.6 FL — HIGH (ref 80–100)
NRBC # FLD: 0 K/UL — SIGNIFICANT CHANGE UP (ref 0–0)
PLATELET # BLD AUTO: 199 K/UL — SIGNIFICANT CHANGE UP (ref 150–400)
PMV BLD: 11.5 FL — SIGNIFICANT CHANGE UP (ref 7–13)
POTASSIUM SERPL-MCNC: 3.8 MMOL/L — SIGNIFICANT CHANGE UP (ref 3.5–5.3)
POTASSIUM SERPL-SCNC: 3.8 MMOL/L — SIGNIFICANT CHANGE UP (ref 3.5–5.3)
RBC # BLD: 3.11 M/UL — LOW (ref 3.8–5.2)
RBC # FLD: 13.2 % — SIGNIFICANT CHANGE UP (ref 10.3–14.5)
SODIUM SERPL-SCNC: 138 MMOL/L — SIGNIFICANT CHANGE UP (ref 135–145)
WBC # BLD: 7.88 K/UL — SIGNIFICANT CHANGE UP (ref 3.8–10.5)
WBC # FLD AUTO: 7.88 K/UL — SIGNIFICANT CHANGE UP (ref 3.8–10.5)

## 2020-01-27 PROCEDURE — 99223 1ST HOSP IP/OBS HIGH 75: CPT

## 2020-01-27 RX ORDER — SODIUM BICARBONATE 1 MEQ/ML
650 SYRINGE (ML) INTRAVENOUS
Refills: 0 | Status: DISCONTINUED | OUTPATIENT
Start: 2020-01-27 | End: 2020-01-28

## 2020-01-27 RX ADMIN — HEPARIN SODIUM 5000 UNIT(S): 5000 INJECTION INTRAVENOUS; SUBCUTANEOUS at 21:56

## 2020-01-27 RX ADMIN — Medication 650 MILLIGRAM(S): at 17:27

## 2020-01-27 RX ADMIN — HEPARIN SODIUM 5000 UNIT(S): 5000 INJECTION INTRAVENOUS; SUBCUTANEOUS at 05:01

## 2020-01-27 RX ADMIN — AMLODIPINE BESYLATE 10 MILLIGRAM(S): 2.5 TABLET ORAL at 05:00

## 2020-01-27 RX ADMIN — HEPARIN SODIUM 5000 UNIT(S): 5000 INJECTION INTRAVENOUS; SUBCUTANEOUS at 14:12

## 2020-01-27 RX ADMIN — Medication 12.5 MILLIGRAM(S): at 17:28

## 2020-01-27 RX ADMIN — Medication 1: at 16:55

## 2020-01-27 RX ADMIN — Medication 1: at 11:44

## 2020-01-27 RX ADMIN — Medication 81 MILLIGRAM(S): at 14:12

## 2020-01-27 RX ADMIN — Medication 1 TABLET(S): at 14:13

## 2020-01-27 RX ADMIN — CEFTRIAXONE 100 MILLIGRAM(S): 500 INJECTION, POWDER, FOR SOLUTION INTRAMUSCULAR; INTRAVENOUS at 17:27

## 2020-01-27 RX ADMIN — Medication 650 MILLIGRAM(S): at 15:07

## 2020-01-27 RX ADMIN — Medication 12.5 MILLIGRAM(S): at 05:00

## 2020-01-27 RX ADMIN — MEMANTINE HYDROCHLORIDE 10 MILLIGRAM(S): 10 TABLET ORAL at 14:12

## 2020-01-27 RX ADMIN — Medication 650 MILLIGRAM(S): at 14:11

## 2020-01-27 NOTE — CONSULT NOTE ADULT - SUBJECTIVE AND OBJECTIVE BOX
HPI:  89 Creole speaking female, h/o dementia, DM2, HTN,  presents from aide for evaluation of AMS. Patient is a poor historian, AAOx2 at baseline and hard of hearing. Unable to obtain history. Patient denies symptoms. She reports that she was on a train [place] and is unable to recall how she ended up at the hospital. Unable to reach family members for collateral information.   Currently, only has mild back pain, denies any CP or SOB.     PCP- Dr Buster Paulson (22 Jan 2020 13:38)    PERTINENT PM/SXH:   Obesity  Anemia  Dementia  OA (osteoarthritis)  Chronic low back pain  DM (diabetes mellitus)  HTN (hypertension)  Shingles  DM (diabetes mellitus)  HTN (hypertension)    S/P hysterectomy with oophorectomy  Uterine fibroid  No significant past surgical history    FAMILY HISTORY:  No pertinent family history in first degree relatives    ITEMS NOT CHECKED ARE NOT PRESENT    SOCIAL HISTORY:   Significant other/partner:  [ ]  Children:  [x ]  Bahai/Spirituality:  Substance hx:  [ ]   Tobacco hx:  [ ]   Alcohol hx: [ ]   Home Opioid hx:  [ ] I-Stop Reference No:  Living Situation: [x ]Home  [ ]Long term care  [ ]Rehab [ ]Other  Has HHA 10hrs/dy    ADVANCE DIRECTIVES:    DNR  MOLST  [ ]  Living Will  [ ]   DECISION MAKER(s):  [ ] Health Care Proxy(s)  [ x] Surrogate(s)  [ ] Guardian           Name(s): Phone Number(s):  Jim Dickinson #[phone] (son)    BASELINE (I)ADL(s) (prior to admission):  [place]: [ ]Total  [ ] Moderate [x ]Dependent    Allergies    No Known Allergies    Intolerances    MEDICATIONS  (STANDING):  amLODIPine   Tablet 10 milliGRAM(s) Oral daily  aspirin enteric coated 81 milliGRAM(s) Oral daily  cefTRIAXone   IVPB 1000 milliGRAM(s) IV Intermittent every 24 hours  dextrose 5%. 1000 milliLiter(s) (50 mL/Hr) IV Continuous <Continuous>  dextrose 50% Injectable 12.5 Gram(s) IV Push once  dextrose 50% Injectable 25 Gram(s) IV Push once  dextrose 50% Injectable 25 Gram(s) IV Push once  heparin  Injectable 5000 Unit(s) SubCutaneous every 8 hours  insulin lispro (HumaLOG) corrective regimen sliding scale   SubCutaneous three times a day before meals  insulin lispro (HumaLOG) corrective regimen sliding scale   SubCutaneous at bedtime  memantine 10 milliGRAM(s) Oral daily  metoprolol tartrate 12.5 milliGRAM(s) Oral two times a day  multivitamin 1 Tablet(s) Oral daily  sodium chloride 0.9%. 1000 milliLiter(s) (60 mL/Hr) IV Continuous <Continuous>    MEDICATIONS  (PRN):  acetaminophen   Tablet .. 650 milliGRAM(s) Oral every 6 hours PRN Temp greater or equal to 38C (100.4F), Mild Pain (1 - 3), Moderate Pain (4 - 6), Severe Pain (7 - 10)  bisacodyl 5 milliGRAM(s) Oral daily PRN Constipation  dextrose 40% Gel 15 Gram(s) Oral once PRN Blood Glucose LESS THAN 70 milliGRAM(s)/deciliter  glucagon  Injectable 1 milliGRAM(s) IntraMuscular once PRN Glucose LESS THAN 70 milligrams/deciliter  meclizine 25 milliGRAM(s) Oral two times a day PRN Dizziness  ondansetron Injectable 4 milliGRAM(s) IV Push every 6 hours PRN Nausea  senna 2 Tablet(s) Oral at bedtime PRN Constipation    PRESENT SYMPTOMS: [ ]Unable to obtain due to poor mentation   Source if other than patient:  [ ]Family   [ ]Team     Pain (Impact on QOL):  denies  Location -         Minimal acceptable level (0-10 scale):                    Aggravating factors -  Quality -  Radiation -  Severity (0-10 scale) -    Timing -    PAIN AD Score:     [email or web address]/[email or web address] (press ctrl +  left click to view)    Dyspnea:                           [ ]Mild [ ]Moderate [ ]Severe  Anxiety:                             [ ]Mild [ ]Moderate [ ]Severe  Fatigue:                             [ ]Mild [ ]Moderate [ ]Severe  Nausea:                             [ ]Mild [ ]Moderate [ ]Severe  Loss of appetite:              [ ]Mild [x ]Moderate [ ]Severe  Constipation:                    [ ]Mild [ ]Moderate [ ]Severe    Other Symptoms:  [x ]All other review of systems negative     Karnofsky Performance Score/Palliative Performance Status Version 2:  40 %    http://palliative.info/resource_material/[email or web address].pdf    PHYSICAL EXAM:  Vital Signs Last 24 Hrs  T(C): 36.2 (27 Jan 2020 04:50), Max: 36.8 (26 Jan 2020 12:50)  T(F): 97.1 (27 Jan 2020 04:50), Max: 98.3 (26 Jan 2020 12:50)  HR: 74 (27 Jan 2020 04:50) (74 - 91)  BP: 139/65 (27 Jan 2020 04:50) (132/69 - 143/73)  BP(mean): --  RR: 16 (27 Jan 2020 04:50) (16 - 17)  SpO2: 99% (27 Jan 2020 04:50) (95% - 99%) I&O's Summary    26 Jan 2020 07:01  -  27 Jan 2020 07:00  --------------------------------------------------------  IN: 968 mL / OUT: 1350 mL / NET: -382 mL    GENERAL:  [x ]Alert  [x ]Oriented x 1-2  [ ]Lethargic  [ ]Cachexia  [ ]Unarousable  [x ]Verbal  [ ]Non-Verbal  Behavioral:   [ ] Anxiety  [x ] Delirium [ ] Agitation [ ] Other  HEENT:  [ ]Normal   [x ]Dry mouth   [ ]ET Tube/Trach  [ ]Oral lesions  PULMONARY:   [x ]Clear [ ]Tachypnea  [ ]Audible excessive secretions   [ ]Rhonchi        [ ]Right [ ]Left [ ]Bilateral  [ ]Crackles        [ ]Right [ ]Left [ ]Bilateral  [ ]Wheezing     [ ]Right [ ]Left [ ]Bilateral  CARDIOVASCULAR:    [ x]Regular [ ]Irregular [ ]Tachy  [ ]Juan J [ ]Murmur [ ]Other  GASTROINTESTINAL:  [x ]Soft  [ ]Distended   [ x]+BS  x[ ]Non tender [ ]Tender  [ ]PEG [ ]OGT/ NGT  Last BM: BM reported since admission  GENITOURINARY:  [ ]Normal [x ] Incontinent   [ ]Oliguria/Anuria   [ ][place]  MUSCULOSKELETAL:   [ ]Normal   [x ]Weakness  [ ]Bed/Wheelchair bound [ ]Edema  NEUROLOGIC:   [ ]No focal deficits  [x ] Cognitive impairment  [ ] Dysphagia [ ]Dysarthria [ ] Paresis [ ]Other   SKIN:   [ x]Normal   [ ]Pressure ulcer(s)  [ ]Rash    CRITICAL CARE:  [ ] Shock Present  [ ]Septic [ ]Cardiogenic [ ]Neurologic [ ]Hypovolemic  [ ]  Vasopressors [ ]  Inotropes   [ ] Respiratory failure present  [ ] Acute  [ ] Chronic [ ] Hypoxic  [ ] Hypercarbic [ ] Other  [ ] Other organ failure     GRIEF  [ ] Yes  [x ] No    LABS:                        10.1   7.88  )-----------( 199      ( 27 Jan 2020 05:45 )             31.9   01-27    138  |  110<H>  |  24<H>  ----------------------------<  134<H>  3.8   |  17<L>  |  1.48<H>    Ca    9.1      27 Jan 2020 05:45    RADIOLOGY & ADDITIONAL STUDIES:     < from: CT Head No Cont (01.23.20 @ 14:52) >  IMPRESSION:     There is no acute hemorrhage, mass effect, or midline shift.    PROTEIN CALORIE MALNUTRITION PRESENT: [ ] Yes [ ] No  [ ] PPSV2 < or = to 30% [ ] significant weight loss  [ ] poor nutritional intake [ ] catabolic state [ ] anasarca     Albumin, Serum: 4.5 g/dL (01-23-20 @ 06:48)  Artificial Nutrition [ ]     REFERRALS:   [ ]Chaplaincy  [ ] Hospice  [ ]Child Life  [ ]Social Work  [ ]Case management [ ]Holistic Therapy   Goals of Care Discussion Document:

## 2020-01-27 NOTE — PROGRESS NOTE ADULT - PROBLEM SELECTOR PLAN 1
Cr improving, 2.27 to 2.0 >1.5>1.5 stable  Lytes ok. no evidence of fluid overload, eating poorly  Urine retention, s/p Straight catheter, now w/indwelling murcia. u.o good  s/p IV NS 75 cc hrly. d/c IVF. encourage po intake  daily BMP Cr improving, 2.27 to 2.0 >1.5>1.5 stable  underlying CKD 3, Cr 1.5 likely new b/l now  Lytes ok. no evidence of fluid overload, eating poorly  Urine retention, s/p Straight catheter, now w/indwelling murcia. u.o good  been off IVF. encourage po intake  M acidosis 2/2 CKD-added po Na bicarb 625mg bid  daily BMP

## 2020-01-27 NOTE — PROGRESS NOTE ADULT - SUBJECTIVE AND OBJECTIVE BOX
Patient is a 89y old  Female who presents with a chief complaint of     pt. seen and examined    INTERVAL HPI/OVERNIGHT EVENTS:  T(C): 36.8 (01-27-20 @ 21:41), Max: 36.8 (01-27-20 @ 21:41)  HR: 87 (01-27-20 @ 21:41) (74 - 92)  BP: 151/83 (01-27-20 @ 21:41) (139/65 - 151/83)  RR: 18 (01-27-20 @ 21:41) (16 - 18)  SpO2: 95% (01-27-20 @ 21:41) (95% - 99%)  Wt(kg): --  I&O's Summary    26 Jan 2020 07:01  -  27 Jan 2020 07:00  --------------------------------------------------------  IN: 968 mL / OUT: 1350 mL / NET: -382 mL    27 Jan 2020 07:01  -  28 Jan 2020 01:23  --------------------------------------------------------  IN: 718 mL / OUT: 1125 mL / NET: -407 mL        PAST MEDICAL & SURGICAL HISTORY:  Obesity  Anemia  Dementia  OA (osteoarthritis)  Chronic low back pain  Shingles  DM (diabetes mellitus)  HTN (hypertension)  S/P hysterectomy with oophorectomy  Uterine fibroid      SOCIAL HISTORY  Alcohol:  Tobacco:  Illicit substance use:    FAMILY HISTORY:    REVIEW OF SYSTEMS:  CONSTITUTIONAL: No fever, weight loss, or fatigue  EYES: No eye pain, visual disturbances, or discharge  ENMT:  No difficulty hearing, tinnitus, vertigo; No sinus or throat pain  NECK: No pain or stiffness  RESPIRATORY: No cough, wheezing, chills or hemoptysis; No shortness of breath  CARDIOVASCULAR: No chest pain, palpitations, dizziness, or leg swelling  GASTROINTESTINAL: No abdominal or epigastric pain. No nausea, vomiting, or hematemesis; No diarrhea or constipation. No melena or hematochezia.  GENITOURINARY: No dysuria, frequency, hematuria, or incontinence  NEUROLOGICAL: No headaches, memory loss, loss of strength, numbness, or tremors  SKIN: No itching, burning, rashes, or lesions   LYMPH NODES: No enlarged glands  ENDOCRINE: No heat or cold intolerance; No hair loss  MUSCULOSKELETAL: No joint pain or swelling; No muscle, back, or extremity pain  PSYCHIATRIC: No depression, anxiety, mood swings, or difficulty sleeping  HEME/LYMPH: No easy bruising, or bleeding gums  ALLERY AND IMMUNOLOGIC: No hives or eczema    RADIOLOGY & ADDITIONAL TESTS:    Imaging Personally Reviewed:  [ ] YES  [ ] NO    Consultant(s) Notes Reviewed:  [ ] YES  [ ] NO    PHYSICAL EXAM:  GENERAL: NAD, well-groomed, well-developed  HEAD:  Atraumatic, Normocephalic  EYES: EOMI, PERRLA, conjunctiva and sclera clear  ENMT: No tonsillar erythema, exudates, or enlargement; Moist mucous membranes, Good dentition, No lesions  NECK: Supple, No JVD, Normal thyroid  NERVOUS SYSTEM:  Alert & Oriented X3, Good concentration; Motor Strength 5/5 B/L upper and lower extremities; DTRs 2+ intact and symmetric  CHEST/LUNG: Clear to percussion bilaterally; No rales, rhonchi, wheezing, or rubs  HEART: Regular rate and rhythm; No murmurs, rubs, or gallops  ABDOMEN: Soft, Nontender, Nondistended; Bowel sounds present  EXTREMITIES:  2+ Peripheral Pulses, No clubbing, cyanosis, or edema  LYMPH: No lymphadenopathy noted  SKIN: No rashes or lesions    LABS:                        10.1   7.88  )-----------( 199      ( 27 Jan 2020 05:45 )             31.9     01-27    138  |  110<H>  |  24<H>  ----------------------------<  134<H>  3.8   |  17<L>  |  1.48<H>    Ca    9.1      27 Jan 2020 05:45          CAPILLARY BLOOD GLUCOSE      POCT Blood Glucose.: 172 mg/dL (27 Jan 2020 21:13)  POCT Blood Glucose.: 177 mg/dL (27 Jan 2020 16:49)  POCT Blood Glucose.: 155 mg/dL (27 Jan 2020 11:33)  POCT Blood Glucose.: 111 mg/dL (27 Jan 2020 07:26)            MEDICATIONS  (STANDING):  amLODIPine   Tablet 10 milliGRAM(s) Oral daily  aspirin enteric coated 81 milliGRAM(s) Oral daily  cefTRIAXone   IVPB 1000 milliGRAM(s) IV Intermittent every 24 hours  dextrose 5%. 1000 milliLiter(s) (50 mL/Hr) IV Continuous <Continuous>  dextrose 50% Injectable 12.5 Gram(s) IV Push once  dextrose 50% Injectable 25 Gram(s) IV Push once  dextrose 50% Injectable 25 Gram(s) IV Push once  heparin  Injectable 5000 Unit(s) SubCutaneous every 8 hours  insulin lispro (HumaLOG) corrective regimen sliding scale   SubCutaneous three times a day before meals  insulin lispro (HumaLOG) corrective regimen sliding scale   SubCutaneous at bedtime  memantine 10 milliGRAM(s) Oral daily  metoprolol tartrate 12.5 milliGRAM(s) Oral two times a day  multivitamin 1 Tablet(s) Oral daily  sodium bicarbonate 650 milliGRAM(s) Oral two times a day  sodium chloride 0.9%. 1000 milliLiter(s) (60 mL/Hr) IV Continuous <Continuous>    MEDICATIONS  (PRN):  acetaminophen   Tablet .. 650 milliGRAM(s) Oral every 6 hours PRN Temp greater or equal to 38C (100.4F), Mild Pain (1 - 3), Moderate Pain (4 - 6), Severe Pain (7 - 10)  bisacodyl 5 milliGRAM(s) Oral daily PRN Constipation  dextrose 40% Gel 15 Gram(s) Oral once PRN Blood Glucose LESS THAN 70 milliGRAM(s)/deciliter  glucagon  Injectable 1 milliGRAM(s) IntraMuscular once PRN Glucose LESS THAN 70 milligrams/deciliter  meclizine 25 milliGRAM(s) Oral two times a day PRN Dizziness  ondansetron Injectable 4 milliGRAM(s) IV Push every 6 hours PRN Nausea  senna 2 Tablet(s) Oral at bedtime PRN Constipation      Care Discussed with Consultants/Other Providers [ ] YES  [ ] NO

## 2020-01-27 NOTE — CONSULT NOTE ADULT - PROBLEM SELECTOR RECOMMENDATION 2
Per documentation pt still ambulating with walker at home, has wheelchair to use. Has HHA 10hrs/dy  At this time pt still has ambulation and speech, dually incontinent. Likely will need hospice services when dementia progresses (>/= 7C)

## 2020-01-27 NOTE — PROGRESS NOTE ADULT - ASSESSMENT
89 Creole speaking female, h/o dementia, DM2, HTN,  presented from [facility] for evaluation of AMS. Renal following for MAGALIE.     MAGALIE on CKD 3 b/l Cr 1.1-1.3 07/2019  MAGALIE pre-renal azotemia vs post renal  clinically euvolemic  no hydro on renal sono    HTN-BP now better  DM- Mx per primary team    labs, chart reviewed  low Na in diet  off metformin  monitor BMP daily and u/o qshift  dose all meds for eGFR<15ml/min.   avoid ACEi/ARB/NSAIDs/Nephrotoxics.  will closely f/u. 89 Creole speaking female, h/o dementia, DM2, HTN,  presented from [facility] for evaluation of AMS. Renal following for MAGALIE.     MAGALIE on CKD 3 b/l Cr 1.1-1.3 07/2019  MAGALIE pre-renal azotemia vs post renal  clinically euvolemic  no hydro on renal sono    HTN-controlled  DM- Mx per primary team    labs, chart reviewed  low Na in diet  off metformin  monitor BMP daily and u/o qshift  dose all meds for eGFR<15ml/min.   avoid ACEi/ARB/NSAIDs/Nephrotoxics.  will closely f/u.

## 2020-01-27 NOTE — CONSULT NOTE ADULT - PROBLEM SELECTOR RECOMMENDATION 3
Pt is full code  Will address advanced directives as well as nutritional GOC  Son will be coming in at 2-3PM

## 2020-01-27 NOTE — PROGRESS NOTE ADULT - SUBJECTIVE AND OBJECTIVE BOX
[facility]      Seen earlier today, and examined.  - Today, patient is without complaints.           *****MEDICATIONS: Current medication reviewed and documented.    MEDICATIONS  (STANDING):  amLODIPine   Tablet 10 milliGRAM(s) Oral daily  aspirin enteric coated 81 milliGRAM(s) Oral daily  cefTRIAXone   IVPB 1000 milliGRAM(s) IV Intermittent every 24 hours  dextrose 5%. 1000 milliLiter(s) (50 mL/Hr) IV Continuous <Continuous>  dextrose 50% Injectable 12.5 Gram(s) IV Push once  dextrose 50% Injectable 25 Gram(s) IV Push once  dextrose 50% Injectable 25 Gram(s) IV Push once  heparin  Injectable 5000 Unit(s) SubCutaneous every 8 hours  insulin lispro (HumaLOG) corrective regimen sliding scale   SubCutaneous three times a day before meals  insulin lispro (HumaLOG) corrective regimen sliding scale   SubCutaneous at bedtime  memantine 10 milliGRAM(s) Oral daily  metoprolol tartrate 12.5 milliGRAM(s) Oral two times a day  multivitamin 1 Tablet(s) Oral daily  sodium bicarbonate 650 milliGRAM(s) Oral two times a day  sodium chloride 0.9%. 1000 milliLiter(s) (60 mL/Hr) IV Continuous <Continuous>    MEDICATIONS  (PRN):  acetaminophen   Tablet .. 650 milliGRAM(s) Oral every 6 hours PRN Temp greater or equal to 38C (100.4F), Mild Pain (1 - 3), Moderate Pain (4 - 6), Severe Pain (7 - 10)  bisacodyl 5 milliGRAM(s) Oral daily PRN Constipation  dextrose 40% Gel 15 Gram(s) Oral once PRN Blood Glucose LESS THAN 70 milliGRAM(s)/deciliter  glucagon  Injectable 1 milliGRAM(s) IntraMuscular once PRN Glucose LESS THAN 70 milligrams/deciliter  meclizine 25 milliGRAM(s) Oral two times a day PRN Dizziness  ondansetron Injectable 4 milliGRAM(s) IV Push every 6 hours PRN Nausea  senna 2 Tablet(s) Oral at bedtime PRN Constipation          ***** VITAL SIGNS:  T(F): 98.2 (01-27-20 @ 21:41), Max: 98.2 (01-27-20 @ 21:41)  HR: 87 (01-27-20 @ 21:41) (74 - 92)  BP: 151/83 (01-27-20 @ 21:41) (139/65 - 151/83)  RR: 18 (01-27-20 @ 21:41) (16 - 18)  SpO2: 95% (01-27-20 @ 21:41) (95% - 99%)  Wt(kg): --  ,   I&O's Summary    26 Jan 2020 07:01  -  27 Jan 2020 07:00  --------------------------------------------------------  IN: 968 mL / OUT: 1350 mL / NET: -382 mL    27 Jan 2020 07:01  -  28 Jan 2020 03:38  --------------------------------------------------------  IN: 918 mL / OUT: 1725 mL / NET: -807 mL             *****PHYSICAL EXAM:   alert oriented x 1 attention comprehension are better appears pleasant     Able to name, repeat.   EOmi fundi not visualized   no nystagmus VFF to confrontation  Tongue is midline  Palate elevates symmetrically   Moving all 4 ext spontaneously no drift appreciated    Gait not assessed.            *****LAB AND IMAGING:                        10.1   7.88  )-----------( 199      ( 27 Jan 2020 05:45 )             31.9               01-27    138  |  110<H>  |  24<H>  ----------------------------<  134<H>  3.8   |  17<L>  |  1.48<H>    Ca    9.1      27 Jan 2020 05:45                           [All pertinent recent Imaging/Reports reviewed]           *****A S S E S S M E N T   A N D   P L A N :     Excerpt from H&P  89 Creole speaking female, h/o dementia, DM2, HTN,  presents from aide for evaluation of AMS. Patient is a poor historian, AAOx2 at baseline and hard of hearing. Unable to obtain history. Patient denies symptoms. She reports that she was on a train [place] and is unable to recall how she ended up at the hospital. Unable to reach family members for collateral information.   Currently, only has mild back pain, denies any CP or SOB.         limited exam as pt is not cooperative.  Problem/Recommendations 1 Agitation likely sundowning due to dementia  will regulate sleep wake cycle  r/o infectious process      Problem/Recommendations 2: lethargy improved.  stroke team was called who eval pt and discontinued the call as son at bedside reported that pts is at baseline   avoid any sedation   ct head unremarkable   eeg did not reveal any sz   will continue       Thank you for allowing me to participate in the care of this patient. Please do not hesitate to call me if you have any  questions.        ________________  Stormy Chavira MD  [facility]  [phone]      33 minutes spent on total encounter; more than 50 % of the visit was  spent counseling about plan of care, compliance to diet/exercise and medication regimen and or  coordinating care by the attending physician.      It is advised that stroke patients follow up with DELILAH Callahan @ [phone] in 1- 2 weeks.   Others please follow up with Dr. Michael Nissenbaum [phone]

## 2020-01-28 LAB
ANION GAP SERPL CALC-SCNC: 15 MMO/L — HIGH (ref 7–14)
BUN SERPL-MCNC: 20 MG/DL — SIGNIFICANT CHANGE UP (ref 7–23)
CALCIUM SERPL-MCNC: 10 MG/DL — SIGNIFICANT CHANGE UP (ref 8.4–10.5)
CHLORIDE SERPL-SCNC: 108 MMOL/L — HIGH (ref 98–107)
CO2 SERPL-SCNC: 13 MMOL/L — LOW (ref 22–31)
CREAT SERPL-MCNC: 1.12 MG/DL — SIGNIFICANT CHANGE UP (ref 0.5–1.3)
GLUCOSE SERPL-MCNC: 138 MG/DL — HIGH (ref 70–99)
POTASSIUM SERPL-MCNC: 4.8 MMOL/L — SIGNIFICANT CHANGE UP (ref 3.5–5.3)
POTASSIUM SERPL-SCNC: 4.8 MMOL/L — SIGNIFICANT CHANGE UP (ref 3.5–5.3)
SODIUM SERPL-SCNC: 136 MMOL/L — SIGNIFICANT CHANGE UP (ref 135–145)

## 2020-01-28 RX ORDER — SODIUM BICARBONATE 1 MEQ/ML
650 SYRINGE (ML) INTRAVENOUS THREE TIMES A DAY
Refills: 0 | Status: DISCONTINUED | OUTPATIENT
Start: 2020-01-28 | End: 2020-02-13

## 2020-01-28 RX ADMIN — Medication 12.5 MILLIGRAM(S): at 17:09

## 2020-01-28 RX ADMIN — HEPARIN SODIUM 5000 UNIT(S): 5000 INJECTION INTRAVENOUS; SUBCUTANEOUS at 23:55

## 2020-01-28 RX ADMIN — Medication 81 MILLIGRAM(S): at 12:09

## 2020-01-28 RX ADMIN — AMLODIPINE BESYLATE 10 MILLIGRAM(S): 2.5 TABLET ORAL at 05:06

## 2020-01-28 RX ADMIN — Medication 650 MILLIGRAM(S): at 12:10

## 2020-01-28 RX ADMIN — HEPARIN SODIUM 5000 UNIT(S): 5000 INJECTION INTRAVENOUS; SUBCUTANEOUS at 12:10

## 2020-01-28 RX ADMIN — CEFTRIAXONE 100 MILLIGRAM(S): 500 INJECTION, POWDER, FOR SOLUTION INTRAMUSCULAR; INTRAVENOUS at 17:08

## 2020-01-28 RX ADMIN — Medication 650 MILLIGRAM(S): at 23:55

## 2020-01-28 RX ADMIN — HEPARIN SODIUM 5000 UNIT(S): 5000 INJECTION INTRAVENOUS; SUBCUTANEOUS at 05:06

## 2020-01-28 RX ADMIN — Medication 12.5 MILLIGRAM(S): at 05:06

## 2020-01-28 RX ADMIN — MEMANTINE HYDROCHLORIDE 10 MILLIGRAM(S): 10 TABLET ORAL at 12:10

## 2020-01-28 RX ADMIN — Medication 1 TABLET(S): at 12:09

## 2020-01-28 RX ADMIN — Medication 1: at 12:08

## 2020-01-28 RX ADMIN — Medication 650 MILLIGRAM(S): at 05:06

## 2020-01-28 NOTE — PROGRESS NOTE ADULT - ASSESSMENT
89 Creole speaking female, h/o dementia, DM2, HTN,  presented from [facility] for evaluation of AMS. Renal following for MAGALIE.     MAGALIE on CKD 3 b/l Cr 1.1-1.3 07/2019  MAGALIE pre-renal azotemia vs post renal  clinically euvolemic  no hydro on renal sono    HTN-controlled  DM- Mx per primary team    labs, chart reviewed  low Na in diet  off metformin  monitor BMP daily and u/o qshift  dose all meds for eGFR<15ml/min.   avoid ACEi/ARB/NSAIDs/Nephrotoxics.  will closely f/u.

## 2020-01-28 NOTE — PROGRESS NOTE ADULT - SUBJECTIVE AND OBJECTIVE BOX
[facility]      Seen earlier today, and examined.  - Today, patient is without complaints.           *****MEDICATIONS: Current medication reviewed and documented.    MEDICATIONS  (STANDING):  amLODIPine   Tablet 10 milliGRAM(s) Oral daily  aspirin enteric coated 81 milliGRAM(s) Oral daily  cefTRIAXone   IVPB 1000 milliGRAM(s) IV Intermittent every 24 hours  dextrose 5%. 1000 milliLiter(s) (50 mL/Hr) IV Continuous <Continuous>  dextrose 50% Injectable 12.5 Gram(s) IV Push once  dextrose 50% Injectable 25 Gram(s) IV Push once  dextrose 50% Injectable 25 Gram(s) IV Push once  heparin  Injectable 5000 Unit(s) SubCutaneous every 8 hours  insulin lispro (HumaLOG) corrective regimen sliding scale   SubCutaneous three times a day before meals  insulin lispro (HumaLOG) corrective regimen sliding scale   SubCutaneous at bedtime  memantine 10 milliGRAM(s) Oral daily  metoprolol tartrate 12.5 milliGRAM(s) Oral two times a day  multivitamin 1 Tablet(s) Oral daily  potassium chloride    Tablet ER 40 milliEquivalent(s) Oral once  sodium bicarbonate 650 milliGRAM(s) Oral three times a day  sodium chloride 0.9%. 1000 milliLiter(s) (60 mL/Hr) IV Continuous <Continuous>    MEDICATIONS  (PRN):  acetaminophen   Tablet .. 650 milliGRAM(s) Oral every 6 hours PRN Temp greater or equal to 38C (100.4F), Mild Pain (1 - 3), Moderate Pain (4 - 6), Severe Pain (7 - 10)  bisacodyl 5 milliGRAM(s) Oral daily PRN Constipation  dextrose 40% Gel 15 Gram(s) Oral once PRN Blood Glucose LESS THAN 70 milliGRAM(s)/deciliter  glucagon  Injectable 1 milliGRAM(s) IntraMuscular once PRN Glucose LESS THAN 70 milligrams/deciliter  meclizine 25 milliGRAM(s) Oral two times a day PRN Dizziness  ondansetron Injectable 4 milliGRAM(s) IV Push every 6 hours PRN Nausea  senna 2 Tablet(s) Oral at bedtime PRN Constipation          ***** VITAL SIGNS:  T(F): 98.1 (01-29-20 @ 06:22), Max: 98.1 (01-29-20 @ 06:22)  HR: 75 (01-29-20 @ 06:22) (75 - 89)  BP: 141/72 (01-29-20 @ 06:22) (135/78 - 152/69)  RR: 18 (01-29-20 @ 06:22) (17 - 18)  SpO2: 99% (01-29-20 @ 06:22) (99% - 99%)  Wt(kg): --  ,   I&O's Summary    28 Jan 2020 07:01  -  29 Jan 2020 07:00  --------------------------------------------------------  IN: 480 mL / OUT: 1600 mL / NET: -1120 mL             *****PHYSICAL EXAM:  alert oriented x 1 attention comprehension are better appears pleasant   as per aide pt was very agitated   EOmi fundi not visualized   no nystagmus VFF to confrontation  Tongue is midline  Palate elevates symmetrically   Moving all 4 ext spontaneously no drift appreciated    Gait not assessed.   Gait not assessed.            *****LAB AND IMAGING:                            [All pertinent recent Imaging/Reports reviewed]           *****A S S E S S M E N T   A N D   P L A N :       Excerpt from H&P  89 Creole speaking female, h/o dementia, DM2, HTN,  presents from aide for evaluation of AMS. Patient is a poor historian, AAOx2 at baseline and hard of hearing. Unable to obtain history. Patient denies symptoms. She reports that she was on a train [place] and is unable to recall how she ended up at the hospital. Unable to reach family members for collateral information.   Currently, only has mild back pain, denies any CP or SOB.         limited exam as pt is not cooperative.  Problem/Recommendations 1 Agitation likely sundowning due to dementia  depakote bid for beh management     Problem/Recommendations 2: lethargy improved.   avoid any sedation   ct head unremarkable   eeg did not reveal any sz   will continue         Thank you for allowing me to participate in the care of this patient. Please do not hesitate to call me if you have any  questions.        ________________  Stormy Chavira MD  [facility]  [phone]      33 minutes spent on total encounter; more than 50 % of the visit was  spent counseling about plan of care, compliance to diet/exercise and medication regimen and or  coordinating care by the attending physician.      It is advised that stroke patients follow up with DELILAH Callahan @ [phone] in 1- 2 weeks.   Others please follow up with Dr. Michael Nissenbaum [phone]

## 2020-01-28 NOTE — PROGRESS NOTE ADULT - PROBLEM SELECTOR PLAN 1
Cr dec to 1.12, wnl  [facility] ok. no evidence of fluid overload,   Urine retention, s/p Straight catheter, now w/indwelling murcia. u.o good  been off IVF. encourage po intake  M acidosis . inc Nahco3 to tid  daily BMP

## 2020-01-28 NOTE — PROGRESS NOTE ADULT - SUBJECTIVE AND OBJECTIVE BOX
Pt seen and examined at bedside  feels well  no new events  Nurse aide at bedside. eating, drinking ok as per aide      Allergies:  No Known Allergies    Hospital Medications:   MEDICATIONS  (STANDING):  amLODIPine   Tablet 10 milliGRAM(s) Oral daily  aspirin enteric coated 81 milliGRAM(s) Oral daily  cefTRIAXone   IVPB 1000 milliGRAM(s) IV Intermittent every 24 hours  dextrose 5%. 1000 milliLiter(s) (50 mL/Hr) IV Continuous <Continuous>  dextrose 50% Injectable 12.5 Gram(s) IV Push once  dextrose 50% Injectable 25 Gram(s) IV Push once  dextrose 50% Injectable 25 Gram(s) IV Push once  heparin  Injectable 5000 Unit(s) SubCutaneous every 8 hours  insulin lispro (HumaLOG) corrective regimen sliding scale   SubCutaneous three times a day before meals  insulin lispro (HumaLOG) corrective regimen sliding scale   SubCutaneous at bedtime  memantine 10 milliGRAM(s) Oral daily  metoprolol tartrate 12.5 milliGRAM(s) Oral two times a day  multivitamin 1 Tablet(s) Oral daily  sodium bicarbonate 650 milliGRAM(s) Oral two times a day  sodium chloride 0.9%. 1000 milliLiter(s) (60 mL/Hr) IV Continuous <Continuous>    REVIEW OF SYSTEMS:  unable to obtain        VITALS:  T(F): 97.8 (20 @ 07:53), Max: 98.2 (20 @ 21:41)  HR: 81 (20 @ 07:53)  BP: 155/66 (20 @ 07:53)  RR: 18 (20 @ 07:53)  SpO2: 99% (20 @ 07:53)  Wt(kg): --     07: @ 07:00  --------------------------------------------------------  IN: 918 mL / OUT: 1725 mL / NET: -807 mL     @ 07: @ 10:50  --------------------------------------------------------  IN: 240 mL / OUT: 0 mL / NET: 240 mL        PHYSICAL EXAM:  Constitutional: NAD. Lying comfortably in bed  HEENT: anicteric sclera, oropharynx clear, MMM  Neck: No JVD  Respiratory: CTAB, no wheezes, rales or rhonchi  Cardiovascular: S1, S2, RRR  Gastrointestinal: BS+, soft, NT/ND  Extremities: No cyanosis or clubbing. No peripheral edema  Neurological: unable to assess    : No CVA tenderness.  murcia. in place  Skin: No rashes       LABS:      136  |  108<H>  |  20  ----------------------------<  138<H>  4.8   |  13<L>  |  1.12    Ca    10.0      2020 05:45      Creatinine Trend: 1.12 <--, 1.48 <--, 1.47 <--, 1.50 <--, 1.49 <--, 2.09 <--, 2.27 <--, 2.05 <--                        10.1   7.88  )-----------( 199      ( 2020 05:45 )             31.9     Urine Studies:  Urinalysis Basic - ( 2020 03:20 )    Color: YELLOW / Appearance: CLEAR / S.019 / pH: 6.0  Gluc: NEGATIVE / Ketone: NEGATIVE  / Bili: NEGATIVE / Urobili: NORMAL   Blood: NEGATIVE / Protein: 30 / Nitrite: NEGATIVE   Leuk Esterase: SMALL / RBC: 0-2 / WBC 6-10   Sq Epi: FEW / Non Sq Epi:  / Bacteria: NEGATIVE      Sodium, Random Urine: 144 mmol/L ( @ 18:07)  Chloride, Random Urine: 124 mmol/L ( @ 18:07)  Osmolality, Random Urine: 494 mosmo/kg ( @ 18:07)    RADIOLOGY & ADDITIONAL STUDIES:

## 2020-01-29 DIAGNOSIS — N39.0 URINARY TRACT INFECTION, SITE NOT SPECIFIED: ICD-10-CM

## 2020-01-29 LAB
ANION GAP SERPL CALC-SCNC: 12 MMO/L — SIGNIFICANT CHANGE UP (ref 7–14)
BASOPHILS # BLD AUTO: 0.08 K/UL — SIGNIFICANT CHANGE UP (ref 0–0.2)
BASOPHILS NFR BLD AUTO: 1.1 % — SIGNIFICANT CHANGE UP (ref 0–2)
BUN SERPL-MCNC: 19 MG/DL — SIGNIFICANT CHANGE UP (ref 7–23)
CALCIUM SERPL-MCNC: 10 MG/DL — SIGNIFICANT CHANGE UP (ref 8.4–10.5)
CHLORIDE SERPL-SCNC: 107 MMOL/L — SIGNIFICANT CHANGE UP (ref 98–107)
CO2 SERPL-SCNC: 21 MMOL/L — LOW (ref 22–31)
CREAT SERPL-MCNC: 0.99 MG/DL — SIGNIFICANT CHANGE UP (ref 0.5–1.3)
EOSINOPHIL # BLD AUTO: 0.32 K/UL — SIGNIFICANT CHANGE UP (ref 0–0.5)
EOSINOPHIL NFR BLD AUTO: 4.4 % — SIGNIFICANT CHANGE UP (ref 0–6)
GLUCOSE SERPL-MCNC: 137 MG/DL — HIGH (ref 70–99)
HCT VFR BLD CALC: 35.7 % — SIGNIFICANT CHANGE UP (ref 34.5–45)
HGB BLD-MCNC: 11.6 G/DL — SIGNIFICANT CHANGE UP (ref 11.5–15.5)
IMM GRANULOCYTES NFR BLD AUTO: 0.3 % — SIGNIFICANT CHANGE UP (ref 0–1.5)
LYMPHOCYTES # BLD AUTO: 3.33 K/UL — HIGH (ref 1–3.3)
LYMPHOCYTES # BLD AUTO: 45.5 % — HIGH (ref 13–44)
MAGNESIUM SERPL-MCNC: 1.7 MG/DL — SIGNIFICANT CHANGE UP (ref 1.6–2.6)
MCHC RBC-ENTMCNC: 32.1 PG — SIGNIFICANT CHANGE UP (ref 27–34)
MCHC RBC-ENTMCNC: 32.5 % — SIGNIFICANT CHANGE UP (ref 32–36)
MCV RBC AUTO: 98.9 FL — SIGNIFICANT CHANGE UP (ref 80–100)
MONOCYTES # BLD AUTO: 0.59 K/UL — SIGNIFICANT CHANGE UP (ref 0–0.9)
MONOCYTES NFR BLD AUTO: 8.1 % — SIGNIFICANT CHANGE UP (ref 2–14)
NEUTROPHILS # BLD AUTO: 2.98 K/UL — SIGNIFICANT CHANGE UP (ref 1.8–7.4)
NEUTROPHILS NFR BLD AUTO: 40.6 % — LOW (ref 43–77)
NRBC # FLD: 0 K/UL — SIGNIFICANT CHANGE UP (ref 0–0)
PLATELET # BLD AUTO: 206 K/UL — SIGNIFICANT CHANGE UP (ref 150–400)
PMV BLD: 11.1 FL — SIGNIFICANT CHANGE UP (ref 7–13)
POTASSIUM SERPL-MCNC: 3.4 MMOL/L — LOW (ref 3.5–5.3)
POTASSIUM SERPL-SCNC: 3.4 MMOL/L — LOW (ref 3.5–5.3)
RBC # BLD: 3.61 M/UL — LOW (ref 3.8–5.2)
RBC # FLD: 12.8 % — SIGNIFICANT CHANGE UP (ref 10.3–14.5)
SODIUM SERPL-SCNC: 140 MMOL/L — SIGNIFICANT CHANGE UP (ref 135–145)
WBC # BLD: 7.32 K/UL — SIGNIFICANT CHANGE UP (ref 3.8–10.5)
WBC # FLD AUTO: 7.32 K/UL — SIGNIFICANT CHANGE UP (ref 3.8–10.5)

## 2020-01-29 RX ORDER — DIVALPROEX SODIUM 500 MG/1
125 TABLET, DELAYED RELEASE ORAL
Refills: 0 | Status: DISCONTINUED | OUTPATIENT
Start: 2020-01-29 | End: 2020-01-29

## 2020-01-29 RX ORDER — POTASSIUM CHLORIDE 20 MEQ
40 PACKET (EA) ORAL ONCE
Refills: 0 | Status: COMPLETED | OUTPATIENT
Start: 2020-01-29 | End: 2020-01-29

## 2020-01-29 RX ORDER — POTASSIUM CHLORIDE 20 MEQ
40 PACKET (EA) ORAL ONCE
Refills: 0 | Status: DISCONTINUED | OUTPATIENT
Start: 2020-01-29 | End: 2020-01-29

## 2020-01-29 RX ORDER — DIVALPROEX SODIUM 500 MG/1
125 TABLET, DELAYED RELEASE ORAL
Refills: 0 | Status: DISCONTINUED | OUTPATIENT
Start: 2020-01-29 | End: 2020-02-13

## 2020-01-29 RX ORDER — MAGNESIUM SULFATE 500 MG/ML
2 VIAL (ML) INJECTION ONCE
Refills: 0 | Status: COMPLETED | OUTPATIENT
Start: 2020-01-29 | End: 2020-01-29

## 2020-01-29 RX ADMIN — Medication 2: at 17:59

## 2020-01-29 RX ADMIN — Medication 12.5 MILLIGRAM(S): at 17:59

## 2020-01-29 RX ADMIN — Medication 40 MILLIEQUIVALENT(S): at 12:16

## 2020-01-29 RX ADMIN — Medication 81 MILLIGRAM(S): at 15:02

## 2020-01-29 RX ADMIN — MEMANTINE HYDROCHLORIDE 10 MILLIGRAM(S): 10 TABLET ORAL at 15:02

## 2020-01-29 RX ADMIN — HEPARIN SODIUM 5000 UNIT(S): 5000 INJECTION INTRAVENOUS; SUBCUTANEOUS at 06:24

## 2020-01-29 RX ADMIN — HEPARIN SODIUM 5000 UNIT(S): 5000 INJECTION INTRAVENOUS; SUBCUTANEOUS at 21:13

## 2020-01-29 RX ADMIN — Medication 1: at 12:15

## 2020-01-29 RX ADMIN — AMLODIPINE BESYLATE 10 MILLIGRAM(S): 2.5 TABLET ORAL at 06:24

## 2020-01-29 RX ADMIN — HEPARIN SODIUM 5000 UNIT(S): 5000 INJECTION INTRAVENOUS; SUBCUTANEOUS at 15:02

## 2020-01-29 RX ADMIN — Medication 650 MILLIGRAM(S): at 06:25

## 2020-01-29 RX ADMIN — DIVALPROEX SODIUM 125 MILLIGRAM(S): 500 TABLET, DELAYED RELEASE ORAL at 17:59

## 2020-01-29 RX ADMIN — Medication 650 MILLIGRAM(S): at 15:02

## 2020-01-29 RX ADMIN — Medication 1 TABLET(S): at 15:02

## 2020-01-29 RX ADMIN — Medication 650 MILLIGRAM(S): at 21:13

## 2020-01-29 RX ADMIN — Medication 50 GRAM(S): at 15:02

## 2020-01-29 RX ADMIN — Medication 12.5 MILLIGRAM(S): at 06:32

## 2020-01-29 NOTE — PROGRESS NOTE ADULT - ASSESSMENT
89 Creole speaking female, h/o dementia, DM2, HTN,  presented from [facility] for evaluation of AMS. Renal following for MAGALIE.     MAGALIE on CKD 3 b/l Cr 1.1-1.3 07/2019  MAGALIE pre-renal azotemia vs post renal  clinically euvolemic  no hydro on renal sono    HTN-controlled  DM- Mx per primary team    labs, chart reviewed  low Na in diet  off metformin  monitor BMP daily and u/o qshift  Can replete K and Mg to goal 4.0 and 2.0, respectively.   dose all meds for eGFR<15ml/min.   avoid ACEi/ARB/NSAIDs/Nephrotoxics.

## 2020-01-29 NOTE — PROGRESS NOTE ADULT - SUBJECTIVE AND OBJECTIVE BOX
Patient is a 89y old  Female who presents with a chief complaint of MAGALIE (29 Jan 2020 14:20)    pt. seen and examined, denies any c/o    INTERVAL HPI/OVERNIGHT EVENTS:  T(C): 36.6 (01-29-20 @ 12:14), Max: 36.7 (01-28-20 @ 23:54)  HR: 75 (01-29-20 @ 18:15) (70 - 77)  BP: 146/72 (01-29-20 @ 18:15) (141/72 - 152/69)  RR: 16 (01-29-20 @ 18:15) (16 - 18)  SpO2: 100% (01-29-20 @ 18:15) (99% - 100%)  Wt(kg): --  I&O's Summary    28 Jan 2020 07:01  -  29 Jan 2020 07:00  --------------------------------------------------------  IN: 480 mL / OUT: 1600 mL / NET: -1120 mL    29 Jan 2020 07:01  -  29 Jan 2020 20:38  --------------------------------------------------------  IN: 358 mL / OUT: 450 mL / NET: -92 mL        PAST MEDICAL & SURGICAL HISTORY:  Obesity  Anemia  Dementia  OA (osteoarthritis)  Chronic low back pain  Shingles  DM (diabetes mellitus)  HTN (hypertension)  S/P hysterectomy with oophorectomy  Uterine fibroid      SOCIAL HISTORY  Alcohol:  Tobacco:  Illicit substance use:    FAMILY HISTORY:    REVIEW OF SYSTEMS:  CONSTITUTIONAL: No fever, weight loss, or fatigue  EYES: No eye pain, visual disturbances, or discharge  ENMT:  No difficulty hearing, tinnitus, vertigo; No sinus or throat pain  NECK: No pain or stiffness  RESPIRATORY: No cough, wheezing, chills or hemoptysis; No shortness of breath  CARDIOVASCULAR: No chest pain, palpitations, dizziness, or leg swelling  GASTROINTESTINAL: No abdominal or epigastric pain. No nausea, vomiting, or hematemesis; No diarrhea or constipation. No melena or hematochezia.  GENITOURINARY: No dysuria, frequency, hematuria, or incontinence  NEUROLOGICAL: No headaches, memory loss, loss of strength, numbness, or tremors  SKIN: No itching, burning, rashes, or lesions   LYMPH NODES: No enlarged glands  ENDOCRINE: No heat or cold intolerance; No hair loss  MUSCULOSKELETAL: No joint pain or swelling; No muscle, back, or extremity pain  PSYCHIATRIC: No depression, anxiety, mood swings, or difficulty sleeping  HEME/LYMPH: No easy bruising, or bleeding gums  ALLERY AND IMMUNOLOGIC: No hives or eczema    RADIOLOGY & ADDITIONAL TESTS:    Imaging Personally Reviewed:  [ ] YES  [ ] NO    Consultant(s) Notes Reviewed:  [ ] YES  [ ] NO    PHYSICAL EXAM:  GENERAL: NAD, well-groomed, well-developed  HEAD:  Atraumatic, Normocephalic  EYES: EOMI, PERRLA, conjunctiva and sclera clear  ENMT: No tonsillar erythema, exudates, or enlargement; Moist mucous membranes, Good dentition, No lesions  NECK: Supple, No JVD, Normal thyroid  NERVOUS SYSTEM:  Alert & Oriented X3, Good concentration; Motor Strength 5/5 B/L upper and lower extremities; DTRs 2+ intact and symmetric  CHEST/LUNG: Clear to percussion bilaterally; No rales, rhonchi, wheezing, or rubs  HEART: Regular rate and rhythm; No murmurs, rubs, or gallops  ABDOMEN: Soft, Nontender, Nondistended; Bowel sounds present  EXTREMITIES:  2+ Peripheral Pulses, No clubbing, cyanosis, or edema  LYMPH: No lymphadenopathy noted  SKIN: No rashes or lesions    LABS:                        11.6   7.32  )-----------( 206      ( 29 Jan 2020 06:14 )             35.7     01-29    140  |  107  |  19  ----------------------------<  137<H>  3.4<L>   |  21<L>  |  0.99    Ca    10.0      29 Jan 2020 06:14  Mg     1.7     01-29          CAPILLARY BLOOD GLUCOSE      POCT Blood Glucose.: 209 mg/dL (29 Jan 2020 17:04)  POCT Blood Glucose.: 153 mg/dL (29 Jan 2020 11:38)  POCT Blood Glucose.: 132 mg/dL (29 Jan 2020 07:32)  POCT Blood Glucose.: 134 mg/dL (28 Jan 2020 20:45)            MEDICATIONS  (STANDING):  amLODIPine   Tablet 10 milliGRAM(s) Oral daily  aspirin enteric coated 81 milliGRAM(s) Oral daily  dextrose 5%. 1000 milliLiter(s) (50 mL/Hr) IV Continuous <Continuous>  dextrose 50% Injectable 12.5 Gram(s) IV Push once  dextrose 50% Injectable 25 Gram(s) IV Push once  dextrose 50% Injectable 25 Gram(s) IV Push once  diVALproex Sprinkle 125 milliGRAM(s) Oral two times a day  heparin  Injectable 5000 Unit(s) SubCutaneous every 8 hours  insulin lispro (HumaLOG) corrective regimen sliding scale   SubCutaneous three times a day before meals  insulin lispro (HumaLOG) corrective regimen sliding scale   SubCutaneous at bedtime  memantine 10 milliGRAM(s) Oral daily  metoprolol tartrate 12.5 milliGRAM(s) Oral two times a day  multivitamin 1 Tablet(s) Oral daily  sodium bicarbonate 650 milliGRAM(s) Oral three times a day  sodium chloride 0.9%. 1000 milliLiter(s) (60 mL/Hr) IV Continuous <Continuous>    MEDICATIONS  (PRN):  acetaminophen   Tablet .. 650 milliGRAM(s) Oral every 6 hours PRN Temp greater or equal to 38C (100.4F), Mild Pain (1 - 3), Moderate Pain (4 - 6), Severe Pain (7 - 10)  bisacodyl 5 milliGRAM(s) Oral daily PRN Constipation  dextrose 40% Gel 15 Gram(s) Oral once PRN Blood Glucose LESS THAN 70 milliGRAM(s)/deciliter  glucagon  Injectable 1 milliGRAM(s) IntraMuscular once PRN Glucose LESS THAN 70 milligrams/deciliter  meclizine 25 milliGRAM(s) Oral two times a day PRN Dizziness  ondansetron Injectable 4 milliGRAM(s) IV Push every 6 hours PRN Nausea  senna 2 Tablet(s) Oral at bedtime PRN Constipation      Care Discussed with Consultants/Other Providers [ ] YES  [ ] NO

## 2020-01-29 NOTE — PROGRESS NOTE ADULT - SUBJECTIVE AND OBJECTIVE BOX
Nephrology Followup Note - [phone] - Dr Borrego / Dr Owen / Dr Hidalgo / Dr Boyd / Dr Coughlin / Dr Hilliard / Dr Livingston / Dr Jacobs  Pt seen and examined at bedside  No acute events overnight. Pt lethargic     Allergies:  No Known Allergies    Hospital Medications:   MEDICATIONS  (STANDING):  amLODIPine   Tablet 10 milliGRAM(s) Oral daily  aspirin enteric coated 81 milliGRAM(s) Oral daily  cefTRIAXone   IVPB 1000 milliGRAM(s) IV Intermittent every 24 hours  dextrose 5%. 1000 milliLiter(s) (50 mL/Hr) IV Continuous <Continuous>  dextrose 50% Injectable 12.5 Gram(s) IV Push once  dextrose 50% Injectable 25 Gram(s) IV Push once  dextrose 50% Injectable 25 Gram(s) IV Push once  diVALproex Sprinkle 125 milliGRAM(s) Oral two times a day  heparin  Injectable 5000 Unit(s) SubCutaneous every 8 hours  insulin lispro (HumaLOG) corrective regimen sliding scale   SubCutaneous three times a day before meals  insulin lispro (HumaLOG) corrective regimen sliding scale   SubCutaneous at bedtime  memantine 10 milliGRAM(s) Oral daily  metoprolol tartrate 12.5 milliGRAM(s) Oral two times a day  multivitamin 1 Tablet(s) Oral daily  sodium bicarbonate 650 milliGRAM(s) Oral three times a day  sodium chloride 0.9%. 1000 milliLiter(s) (60 mL/Hr) IV Continuous <Continuous>      VITALS:  T(F): 97.9 (01-29-20 @ 12:14), Max: 98.1 (01-29-20 @ 06:22)  HR: 70 (01-29-20 @ 12:14)  BP: 146/69 (01-29-20 @ 12:14)  RR: 17 (01-29-20 @ 12:14)  SpO2: 100% (01-29-20 @ 12:14)  Wt(kg): --    01-28 @ 07:01 - 01-29 @ 07:00  --------------------------------------------------------  IN: 480 mL / OUT: 1600 mL / NET: -1120 mL    01-29 @ 07:01  -  01-29 @ 14:20  --------------------------------------------------------  IN: 118 mL / OUT: 0 mL / NET: 118 mL      PHYSICAL EXAM:  Constitutional: NAD  HEENT: anicteric sclera, oropharynx clear, MMM  Neck: No JVD  Respiratory: CTAB, no wheezes, rales or rhonchi  Cardiovascular: S1, S2, RRR  Gastrointestinal: BS+, soft, NT/ND  Extremities: No cyanosis or clubbing. No peripheral edema  Neurological: lethargic   : No CVA tenderness. No murcia.   Skin: No rashes    LABS:  01-29    140  |  107  |  19  ----------------------------<  137<H>  3.4<L>   |  21<L>  |  0.99    Ca    10.0      29 Jan 2020 06:14  Mg     1.7     01-29      Creatinine Trend: 0.99 <--, 1.12 <--, 1.48 <--, 1.47 <--, 1.50 <--, 1.49 <--, 2.09 <--                        11.6   7.32  )-----------( 206      ( 29 Jan 2020 06:14 )             35.7     Urine Studies:    Sodium, Random Urine: 144 mmol/L (01-23 @ 18:07)  Chloride, Random Urine: 124 mmol/L (01-23 @ 18:07)  Osmolality, Random Urine: 494 mosmo/kg (01-23 @ 18:07)    RADIOLOGY & ADDITIONAL STUDIES:

## 2020-01-29 NOTE — DIETITIAN INITIAL EVALUATION ADULT. - OTHER INFO
Pt 89 yo Creole speaking female with h/o dementia, DM2, HTN with admit diagnosis: Altered Mental Status - per chart review. At time of visit Pt drowsy/disoriented; no family @ bed side. Case discussed with nurse. Per nurse Pt's appetite not well; Pt needs assistance with meals. Will recommend to add PO supplement: Glucerna Shake - 2x daily to Pt's diet rx. No report of nausea, vomiting or diarrhea @ this time. No report of pressure injury @ present. RDN remains available, nurse made aware.

## 2020-01-29 NOTE — DIETITIAN INITIAL EVALUATION ADULT. - DIET TYPE
PO diet supplement (specify)/consistent carbohydrate (no snacks)/mechanical soft/Glucerna Therapeutic Nutrition 8oz. 2x daily (will provide additional ~440 Kcals, ~20 gm Protein);

## 2020-01-30 LAB
ANION GAP SERPL CALC-SCNC: 13 MMO/L — SIGNIFICANT CHANGE UP (ref 7–14)
BUN SERPL-MCNC: 26 MG/DL — HIGH (ref 7–23)
CALCIUM SERPL-MCNC: 10 MG/DL — SIGNIFICANT CHANGE UP (ref 8.4–10.5)
CHLORIDE SERPL-SCNC: 107 MMOL/L — SIGNIFICANT CHANGE UP (ref 98–107)
CO2 SERPL-SCNC: 20 MMOL/L — LOW (ref 22–31)
CREAT SERPL-MCNC: 1.21 MG/DL — SIGNIFICANT CHANGE UP (ref 0.5–1.3)
GLUCOSE SERPL-MCNC: 117 MG/DL — HIGH (ref 70–99)
POTASSIUM SERPL-MCNC: 4.7 MMOL/L — SIGNIFICANT CHANGE UP (ref 3.5–5.3)
POTASSIUM SERPL-SCNC: 4.7 MMOL/L — SIGNIFICANT CHANGE UP (ref 3.5–5.3)
SODIUM SERPL-SCNC: 140 MMOL/L — SIGNIFICANT CHANGE UP (ref 135–145)

## 2020-01-30 RX ORDER — QUETIAPINE FUMARATE 200 MG/1
12.5 TABLET, FILM COATED ORAL ONCE
Refills: 0 | Status: COMPLETED | OUTPATIENT
Start: 2020-01-30 | End: 2020-01-30

## 2020-01-30 RX ADMIN — Medication 12.5 MILLIGRAM(S): at 05:19

## 2020-01-30 RX ADMIN — DIVALPROEX SODIUM 125 MILLIGRAM(S): 500 TABLET, DELAYED RELEASE ORAL at 17:31

## 2020-01-30 RX ADMIN — HEPARIN SODIUM 5000 UNIT(S): 5000 INJECTION INTRAVENOUS; SUBCUTANEOUS at 05:24

## 2020-01-30 RX ADMIN — HEPARIN SODIUM 5000 UNIT(S): 5000 INJECTION INTRAVENOUS; SUBCUTANEOUS at 14:38

## 2020-01-30 RX ADMIN — DIVALPROEX SODIUM 125 MILLIGRAM(S): 500 TABLET, DELAYED RELEASE ORAL at 05:19

## 2020-01-30 RX ADMIN — QUETIAPINE FUMARATE 12.5 MILLIGRAM(S): 200 TABLET, FILM COATED ORAL at 16:17

## 2020-01-30 RX ADMIN — Medication 650 MILLIGRAM(S): at 22:15

## 2020-01-30 RX ADMIN — Medication 81 MILLIGRAM(S): at 14:38

## 2020-01-30 RX ADMIN — Medication 650 MILLIGRAM(S): at 14:49

## 2020-01-30 RX ADMIN — Medication 1: at 12:53

## 2020-01-30 RX ADMIN — Medication 12.5 MILLIGRAM(S): at 17:32

## 2020-01-30 RX ADMIN — Medication 1: at 22:15

## 2020-01-30 RX ADMIN — Medication 650 MILLIGRAM(S): at 05:25

## 2020-01-30 RX ADMIN — Medication 1 TABLET(S): at 14:38

## 2020-01-30 RX ADMIN — Medication 650 MILLIGRAM(S): at 15:35

## 2020-01-30 RX ADMIN — MEMANTINE HYDROCHLORIDE 10 MILLIGRAM(S): 10 TABLET ORAL at 14:38

## 2020-01-30 RX ADMIN — HEPARIN SODIUM 5000 UNIT(S): 5000 INJECTION INTRAVENOUS; SUBCUTANEOUS at 22:14

## 2020-01-30 RX ADMIN — Medication 1: at 17:31

## 2020-01-30 RX ADMIN — AMLODIPINE BESYLATE 10 MILLIGRAM(S): 2.5 TABLET ORAL at 05:20

## 2020-01-30 RX ADMIN — Medication 650 MILLIGRAM(S): at 14:38

## 2020-01-30 NOTE — PROGRESS NOTE ADULT - SUBJECTIVE AND OBJECTIVE BOX
Patient is a 89y old  Female who presents with a chief complaint of MAGALIE (29 Jan 2020 14:20)    pt. today some agitation , s/p seroquel   INTERVAL HPI/OVERNIGHT EVENTS:  T(C): 36.7 (01-31-20 @ 00:22), Max: 36.7 (01-30-20 @ 17:30)  HR: 99 (01-31-20 @ 00:22) (71 - 99)  BP: 147/82 (01-31-20 @ 00:22) (140/57 - 151/64)  RR: 16 (01-31-20 @ 00:22) (16 - 17)  SpO2: 96% (01-31-20 @ 00:22) (95% - 100%)  Wt(kg): --  I&O's Summary    29 Jan 2020 07:01  -  30 Jan 2020 07:00  --------------------------------------------------------  IN: 358 mL / OUT: 700 mL / NET: -342 mL    30 Jan 2020 07:01  -  31 Jan 2020 01:16  --------------------------------------------------------  IN: 476 mL / OUT: 275 mL / NET: 201 mL        PAST MEDICAL & SURGICAL HISTORY:  Obesity  Anemia  Dementia  OA (osteoarthritis)  Chronic low back pain  Shingles  DM (diabetes mellitus)  HTN (hypertension)  S/P hysterectomy with oophorectomy  Uterine fibroid      SOCIAL HISTORY  Alcohol:  Tobacco:  Illicit substance use:    FAMILY HISTORY:    REVIEW OF SYSTEMS:  CONSTITUTIONAL: No fever, weight loss, or fatigue  EYES: No eye pain, visual disturbances, or discharge  ENMT:  No difficulty hearing, tinnitus, vertigo; No sinus or throat pain  NECK: No pain or stiffness  RESPIRATORY: No cough, wheezing, chills or hemoptysis; No shortness of breath  CARDIOVASCULAR: No chest pain, palpitations, dizziness, or leg swelling  GASTROINTESTINAL: No abdominal or epigastric pain. No nausea, vomiting, or hematemesis; No diarrhea or constipation. No melena or hematochezia.  GENITOURINARY: No dysuria, frequency, hematuria, or incontinence  NEUROLOGICAL: No headaches, memory loss, loss of strength, numbness, or tremors  SKIN: No itching, burning, rashes, or lesions   LYMPH NODES: No enlarged glands  ENDOCRINE: No heat or cold intolerance; No hair loss  MUSCULOSKELETAL: No joint pain or swelling; No muscle, back, or extremity pain  PSYCHIATRIC: No depression, anxiety, mood swings, or difficulty sleeping  HEME/LYMPH: No easy bruising, or bleeding gums  ALLERY AND IMMUNOLOGIC: No hives or eczema    RADIOLOGY & ADDITIONAL TESTS:    Imaging Personally Reviewed:  [ ] YES  [ ] NO    Consultant(s) Notes Reviewed:  [ ] YES  [ ] NO    PHYSICAL EXAM:  GENERAL: NAD, well-groomed, well-developed  HEAD:  Atraumatic, Normocephalic  EYES: EOMI, PERRLA, conjunctiva and sclera clear  ENMT: No tonsillar erythema, exudates, or enlargement; Moist mucous membranes, Good dentition, No lesions  NECK: Supple, No JVD, Normal thyroid  NERVOUS SYSTEM:  Alert & Oriented X3, Good concentration; Motor Strength 5/5 B/L upper and lower extremities; DTRs 2+ intact and symmetric  CHEST/LUNG: Clear to percussion bilaterally; No rales, rhonchi, wheezing, or rubs  HEART: Regular rate and rhythm; No murmurs, rubs, or gallops  ABDOMEN: Soft, Nontender, Nondistended; Bowel sounds present  EXTREMITIES:  2+ Peripheral Pulses, No clubbing, cyanosis, or edema  LYMPH: No lymphadenopathy noted  SKIN: No rashes or lesions    LABS:                        11.6   7.32  )-----------( 206      ( 29 Jan 2020 06:14 )             35.7     01-30    140  |  107  |  26<H>  ----------------------------<  117<H>  4.7   |  20<L>  |  1.21    Ca    10.0      30 Jan 2020 07:00  Mg     1.7     01-29          CAPILLARY BLOOD GLUCOSE      POCT Blood Glucose.: 263 mg/dL (30 Jan 2020 21:38)  POCT Blood Glucose.: 177 mg/dL (30 Jan 2020 16:55)  POCT Blood Glucose.: 167 mg/dL (30 Jan 2020 11:53)  POCT Blood Glucose.: 121 mg/dL (30 Jan 2020 07:41)            MEDICATIONS  (STANDING):  amLODIPine   Tablet 10 milliGRAM(s) Oral daily  aspirin enteric coated 81 milliGRAM(s) Oral daily  dextrose 5%. 1000 milliLiter(s) (50 mL/Hr) IV Continuous <Continuous>  dextrose 50% Injectable 12.5 Gram(s) IV Push once  dextrose 50% Injectable 25 Gram(s) IV Push once  dextrose 50% Injectable 25 Gram(s) IV Push once  diVALproex Sprinkle 125 milliGRAM(s) Oral two times a day  heparin  Injectable 5000 Unit(s) SubCutaneous every 8 hours  insulin lispro (HumaLOG) corrective regimen sliding scale   SubCutaneous three times a day before meals  insulin lispro (HumaLOG) corrective regimen sliding scale   SubCutaneous at bedtime  memantine 10 milliGRAM(s) Oral daily  metoprolol tartrate 12.5 milliGRAM(s) Oral two times a day  multivitamin 1 Tablet(s) Oral daily  sodium bicarbonate 650 milliGRAM(s) Oral three times a day  sodium chloride 0.9%. 1000 milliLiter(s) (60 mL/Hr) IV Continuous <Continuous>    MEDICATIONS  (PRN):  acetaminophen   Tablet .. 650 milliGRAM(s) Oral every 6 hours PRN Temp greater or equal to 38C (100.4F), Mild Pain (1 - 3), Moderate Pain (4 - 6), Severe Pain (7 - 10)  bisacodyl 5 milliGRAM(s) Oral daily PRN Constipation  dextrose 40% Gel 15 Gram(s) Oral once PRN Blood Glucose LESS THAN 70 milliGRAM(s)/deciliter  glucagon  Injectable 1 milliGRAM(s) IntraMuscular once PRN Glucose LESS THAN 70 milligrams/deciliter  meclizine 25 milliGRAM(s) Oral two times a day PRN Dizziness  ondansetron Injectable 4 milliGRAM(s) IV Push every 6 hours PRN Nausea  senna 2 Tablet(s) Oral at bedtime PRN Constipation      Care Discussed with Consultants/Other Providers [ ] YES  [ ] NO

## 2020-01-30 NOTE — CHART NOTE - NSCHARTNOTEFT_GEN_A_CORE
Attempted to meet with pts family on multiple occasions.  If family is available to meet please reconsult.  At this time will sign off.

## 2020-01-30 NOTE — PROGRESS NOTE ADULT - ASSESSMENT
89 Creole speaking female, h/o dementia, DM2, HTN,  presented from aide for evaluation of AMS. Renal following for MAGALIE.     MAGALIE on CKD 3 b/l Cr 1.1-1.3 07/2019  MAGALIE pre-renal azotemia vs post renal, resolved MAGALIE.   clinically euvolemic, cr relatively stable.   no hydro on renal sono    HTN-controlled  DM- Mx per primary team

## 2020-01-30 NOTE — PROGRESS NOTE ADULT - SUBJECTIVE AND OBJECTIVE BOX
Nephrology Followup Note - [phone] - Dr Borrego / Dr Owen / Dr Hidalgo / Dr Boyd / Dr Coughlin / Dr Hilliard / Dr Livingston / Dr Jacobs  Pt seen and examined at bedside  No acute events overnight. Pt awake, appears comfortable.     Allergies:  No Known Allergies    Hospital Medications:   MEDICATIONS  (STANDING):  amLODIPine   Tablet 10 milliGRAM(s) Oral daily  aspirin enteric coated 81 milliGRAM(s) Oral daily  dextrose 5%. 1000 milliLiter(s) (50 mL/Hr) IV Continuous <Continuous>  dextrose 50% Injectable 12.5 Gram(s) IV Push once  dextrose 50% Injectable 25 Gram(s) IV Push once  dextrose 50% Injectable 25 Gram(s) IV Push once  diVALproex Sprinkle 125 milliGRAM(s) Oral two times a day  heparin  Injectable 5000 Unit(s) SubCutaneous every 8 hours  insulin lispro (HumaLOG) corrective regimen sliding scale   SubCutaneous three times a day before meals  insulin lispro (HumaLOG) corrective regimen sliding scale   SubCutaneous at bedtime  memantine 10 milliGRAM(s) Oral daily  metoprolol tartrate 12.5 milliGRAM(s) Oral two times a day  multivitamin 1 Tablet(s) Oral daily  sodium bicarbonate 650 milliGRAM(s) Oral three times a day  sodium chloride 0.9%. 1000 milliLiter(s) (60 mL/Hr) IV Continuous <Continuous>      VITALS:  T(F): 97.4 (01-30-20 @ 12:49), Max: 98.2 (01-29-20 @ 18:15)  HR: 73 (01-30-20 @ 12:49)  BP: 140/57 (01-30-20 @ 12:49)  RR: 17 (01-30-20 @ 12:49)  SpO2: 95% (01-30-20 @ 12:49)  Wt(kg): --    01-29 @ 07:01  -  01-30 @ 07:00  --------------------------------------------------------  IN: 358 mL / OUT: 700 mL / NET: -342 mL    01-30 @ 07:01 - 01-30 @ 14:56  --------------------------------------------------------  IN: 358 mL / OUT: 275 mL / NET: 83 mL    PHYSICAL EXAM:  Constitutional: NAD  HEENT: anicteric sclera, oropharynx clear, MMM  Neck: No JVD  Respiratory: CTAB, no wheezes, rales or rhonchi  Cardiovascular: S1, S2, RRR  Gastrointestinal: BS+, soft, NT/ND  Extremities: No cyanosis or clubbing. No peripheral edema  Neurological: A/O x 1, no focal deficits  Psychiatric: Normal mood, normal affect  : No CVA tenderness. +murcia.   Skin: No rashes    LABS:  01-30    140  |  107  |  26<H>  ----------------------------<  117<H>  4.7   |  20<L>  |  1.21    Ca    10.0      30 Jan 2020 07:00  Mg     1.7     01-29      Creatinine Trend: 1.21 <--, 0.99 <--, 1.12 <--, 1.48 <--, 1.47 <--, 1.50 <--, 1.49 <--                        11.6   7.32  )-----------( 206      ( 29 Jan 2020 06:14 )             35.7     Urine Studies:    Sodium, Random Urine: 144 mmol/L (01-23 @ 18:07)  Chloride, Random Urine: 124 mmol/L (01-23 @ 18:07)  Osmolality, Random Urine: 494 mosmo/kg (01-23 @ 18:07)    RADIOLOGY & ADDITIONAL STUDIES:

## 2020-01-31 LAB
ANION GAP SERPL CALC-SCNC: 12 MMO/L — SIGNIFICANT CHANGE UP (ref 7–14)
BUN SERPL-MCNC: 27 MG/DL — HIGH (ref 7–23)
CALCIUM SERPL-MCNC: 9.8 MG/DL — SIGNIFICANT CHANGE UP (ref 8.4–10.5)
CHLORIDE SERPL-SCNC: 106 MMOL/L — SIGNIFICANT CHANGE UP (ref 98–107)
CO2 SERPL-SCNC: 21 MMOL/L — LOW (ref 22–31)
CREAT SERPL-MCNC: 1.27 MG/DL — SIGNIFICANT CHANGE UP (ref 0.5–1.3)
GLUCOSE SERPL-MCNC: 150 MG/DL — HIGH (ref 70–99)
HCT VFR BLD CALC: 33 % — LOW (ref 34.5–45)
HGB BLD-MCNC: 10.6 G/DL — LOW (ref 11.5–15.5)
MAGNESIUM SERPL-MCNC: 2.1 MG/DL — SIGNIFICANT CHANGE UP (ref 1.6–2.6)
MCHC RBC-ENTMCNC: 32.1 % — SIGNIFICANT CHANGE UP (ref 32–36)
MCHC RBC-ENTMCNC: 32.5 PG — SIGNIFICANT CHANGE UP (ref 27–34)
MCV RBC AUTO: 101.2 FL — HIGH (ref 80–100)
NRBC # FLD: 0 K/UL — SIGNIFICANT CHANGE UP (ref 0–0)
PHOSPHATE SERPL-MCNC: 3.3 MG/DL — SIGNIFICANT CHANGE UP (ref 2.5–4.5)
PLATELET # BLD AUTO: 227 K/UL — SIGNIFICANT CHANGE UP (ref 150–400)
PMV BLD: 11.5 FL — SIGNIFICANT CHANGE UP (ref 7–13)
POTASSIUM SERPL-MCNC: 4.2 MMOL/L — SIGNIFICANT CHANGE UP (ref 3.5–5.3)
POTASSIUM SERPL-SCNC: 4.2 MMOL/L — SIGNIFICANT CHANGE UP (ref 3.5–5.3)
RBC # BLD: 3.26 M/UL — LOW (ref 3.8–5.2)
RBC # FLD: 13.2 % — SIGNIFICANT CHANGE UP (ref 10.3–14.5)
SODIUM SERPL-SCNC: 139 MMOL/L — SIGNIFICANT CHANGE UP (ref 135–145)
WBC # BLD: 9.75 K/UL — SIGNIFICANT CHANGE UP (ref 3.8–10.5)
WBC # FLD AUTO: 9.75 K/UL — SIGNIFICANT CHANGE UP (ref 3.8–10.5)

## 2020-01-31 RX ORDER — LANOLIN ALCOHOL/MO/W.PET/CERES
3 CREAM (GRAM) TOPICAL ONCE
Refills: 0 | Status: COMPLETED | OUTPATIENT
Start: 2020-01-31 | End: 2020-01-31

## 2020-01-31 RX ADMIN — Medication 81 MILLIGRAM(S): at 13:07

## 2020-01-31 RX ADMIN — DIVALPROEX SODIUM 125 MILLIGRAM(S): 500 TABLET, DELAYED RELEASE ORAL at 17:30

## 2020-01-31 RX ADMIN — Medication 1 TABLET(S): at 13:07

## 2020-01-31 RX ADMIN — HEPARIN SODIUM 5000 UNIT(S): 5000 INJECTION INTRAVENOUS; SUBCUTANEOUS at 21:59

## 2020-01-31 RX ADMIN — Medication 650 MILLIGRAM(S): at 06:27

## 2020-01-31 RX ADMIN — Medication 12.5 MILLIGRAM(S): at 06:28

## 2020-01-31 RX ADMIN — HEPARIN SODIUM 5000 UNIT(S): 5000 INJECTION INTRAVENOUS; SUBCUTANEOUS at 13:06

## 2020-01-31 RX ADMIN — Medication 12.5 MILLIGRAM(S): at 17:30

## 2020-01-31 RX ADMIN — Medication 1: at 13:06

## 2020-01-31 RX ADMIN — Medication 650 MILLIGRAM(S): at 21:59

## 2020-01-31 RX ADMIN — DIVALPROEX SODIUM 125 MILLIGRAM(S): 500 TABLET, DELAYED RELEASE ORAL at 06:27

## 2020-01-31 RX ADMIN — AMLODIPINE BESYLATE 10 MILLIGRAM(S): 2.5 TABLET ORAL at 06:28

## 2020-01-31 RX ADMIN — Medication 3 MILLIGRAM(S): at 01:01

## 2020-01-31 RX ADMIN — HEPARIN SODIUM 5000 UNIT(S): 5000 INJECTION INTRAVENOUS; SUBCUTANEOUS at 06:28

## 2020-01-31 RX ADMIN — Medication 650 MILLIGRAM(S): at 13:07

## 2020-01-31 RX ADMIN — MEMANTINE HYDROCHLORIDE 10 MILLIGRAM(S): 10 TABLET ORAL at 13:07

## 2020-01-31 NOTE — PROGRESS NOTE ADULT - SUBJECTIVE AND OBJECTIVE BOX
Patient is a 89y old  Female who presents with a chief complaint of MAGALIE (29 Jan 2020 14:20)    pt. seen and examined     INTERVAL HPI/OVERNIGHT EVENTS:  T(C): 36.6 (01-31-20 @ 12:10), Max: 37.1 (01-31-20 @ 06:26)  HR: 69 (01-31-20 @ 17:29) (69 - 99)  BP: 144/92 (01-31-20 @ 17:29) (130/72 - 147/82)  RR: 17 (01-31-20 @ 12:10) (16 - 17)  SpO2: 96% (01-31-20 @ 12:10) (96% - 99%)  Wt(kg): --  I&O's Summary    30 Jan 2020 07:01  -  31 Jan 2020 07:00  --------------------------------------------------------  IN: 894 mL / OUT: 1225 mL / NET: -331 mL    31 Jan 2020 07:01  -  31 Jan 2020 21:12  --------------------------------------------------------  IN: 540 mL / OUT: 475 mL / NET: 65 mL        PAST MEDICAL & SURGICAL HISTORY:  Obesity  Anemia  Dementia  OA (osteoarthritis)  Chronic low back pain  Shingles  DM (diabetes mellitus)  HTN (hypertension)  S/P hysterectomy with oophorectomy  Uterine fibroid      SOCIAL HISTORY  Alcohol:  Tobacco:  Illicit substance use:    FAMILY HISTORY:    REVIEW OF SYSTEMS:  CONSTITUTIONAL: No fever, weight loss, or fatigue  EYES: No eye pain, visual disturbances, or discharge  ENMT:  No difficulty hearing, tinnitus, vertigo; No sinus or throat pain  NECK: No pain or stiffness  RESPIRATORY: No cough, wheezing, chills or hemoptysis; No shortness of breath  CARDIOVASCULAR: No chest pain, palpitations, dizziness, or leg swelling  GASTROINTESTINAL: No abdominal or epigastric pain. No nausea, vomiting, or hematemesis; No diarrhea or constipation. No melena or hematochezia.  GENITOURINARY: No dysuria, frequency, hematuria, or incontinence  NEUROLOGICAL: No headaches, memory loss, loss of strength, numbness, or tremors  SKIN: No itching, burning, rashes, or lesions   LYMPH NODES: No enlarged glands  ENDOCRINE: No heat or cold intolerance; No hair loss  MUSCULOSKELETAL: No joint pain or swelling; No muscle, back, or extremity pain  PSYCHIATRIC: No depression, anxiety, mood swings, or difficulty sleeping  HEME/LYMPH: No easy bruising, or bleeding gums  ALLERY AND IMMUNOLOGIC: No hives or eczema    RADIOLOGY & ADDITIONAL TESTS:    Imaging Personally Reviewed:  [ ] YES  [ ] NO    Consultant(s) Notes Reviewed:  [ ] YES  [ ] NO    PHYSICAL EXAM:  GENERAL: NAD, well-groomed, well-developed  HEAD:  Atraumatic, Normocephalic  EYES: EOMI, PERRLA, conjunctiva and sclera clear  ENMT: No tonsillar erythema, exudates, or enlargement; Moist mucous membranes, Good dentition, No lesions  NECK: Supple, No JVD, Normal thyroid  NERVOUS SYSTEM:  Alert & Oriented X3, Good concentration; Motor Strength 5/5 B/L upper and lower extremities; DTRs 2+ intact and symmetric  CHEST/LUNG: Clear to percussion bilaterally; No rales, rhonchi, wheezing, or rubs  HEART: Regular rate and rhythm; No murmurs, rubs, or gallops  ABDOMEN: Soft, Nontender, Nondistended; Bowel sounds present  EXTREMITIES:  2+ Peripheral Pulses, No clubbing, cyanosis, or edema  LYMPH: No lymphadenopathy noted  SKIN: No rashes or lesions    LABS:                        10.6   9.75  )-----------( 227      ( 31 Jan 2020 06:55 )             33.0     01-31    139  |  106  |  27<H>  ----------------------------<  150<H>  4.2   |  21<L>  |  1.27    Ca    9.8      31 Jan 2020 06:55  Phos  3.3     01-31  Mg     2.1     01-31          CAPILLARY BLOOD GLUCOSE      POCT Blood Glucose.: 125 mg/dL (31 Jan 2020 16:59)  POCT Blood Glucose.: 170 mg/dL (31 Jan 2020 12:55)  POCT Blood Glucose.: 156 mg/dL (31 Jan 2020 11:25)  POCT Blood Glucose.: 148 mg/dL (31 Jan 2020 07:50)  POCT Blood Glucose.: 263 mg/dL (30 Jan 2020 21:38)            MEDICATIONS  (STANDING):  amLODIPine   Tablet 10 milliGRAM(s) Oral daily  aspirin enteric coated 81 milliGRAM(s) Oral daily  dextrose 5%. 1000 milliLiter(s) (50 mL/Hr) IV Continuous <Continuous>  dextrose 50% Injectable 12.5 Gram(s) IV Push once  dextrose 50% Injectable 25 Gram(s) IV Push once  dextrose 50% Injectable 25 Gram(s) IV Push once  diVALproex Sprinkle 125 milliGRAM(s) Oral two times a day  heparin  Injectable 5000 Unit(s) SubCutaneous every 8 hours  insulin lispro (HumaLOG) corrective regimen sliding scale   SubCutaneous three times a day before meals  insulin lispro (HumaLOG) corrective regimen sliding scale   SubCutaneous at bedtime  memantine 10 milliGRAM(s) Oral daily  metoprolol tartrate 12.5 milliGRAM(s) Oral two times a day  multivitamin 1 Tablet(s) Oral daily  sodium bicarbonate 650 milliGRAM(s) Oral three times a day  sodium chloride 0.9%. 1000 milliLiter(s) (60 mL/Hr) IV Continuous <Continuous>    MEDICATIONS  (PRN):  acetaminophen   Tablet .. 650 milliGRAM(s) Oral every 6 hours PRN Temp greater or equal to 38C (100.4F), Mild Pain (1 - 3), Moderate Pain (4 - 6), Severe Pain (7 - 10)  bisacodyl 5 milliGRAM(s) Oral daily PRN Constipation  dextrose 40% Gel 15 Gram(s) Oral once PRN Blood Glucose LESS THAN 70 milliGRAM(s)/deciliter  glucagon  Injectable 1 milliGRAM(s) IntraMuscular once PRN Glucose LESS THAN 70 milligrams/deciliter  meclizine 25 milliGRAM(s) Oral two times a day PRN Dizziness  ondansetron Injectable 4 milliGRAM(s) IV Push every 6 hours PRN Nausea  senna 2 Tablet(s) Oral at bedtime PRN Constipation      Care Discussed with Consultants/Other Providers [ ] YES  [ ] NO

## 2020-01-31 NOTE — PROGRESS NOTE ADULT - ASSESSMENT
89 Creole speaking female, h/o dementia, DM2, HTN,  presented from aide for evaluation of AMS. Renal following for MAGALIE.     MAGALIE on CKD 3 b/l Cr 1.1-1.3 07/2019  MAGALIE pre-renal azotemia vs post renal  clinically euvolemic, cr improved and relatively stable now at 1.2  no hydro on renal sono    HTN-controlled  DM- Mx per primary team

## 2020-01-31 NOTE — PROGRESS NOTE ADULT - SUBJECTIVE AND OBJECTIVE BOX
[facility] - Lexie / Salima WORTHY /Oliver/ ZAYNAB Coughlin/ ZAYNAB Hidalgo/ Johnson Livingston / JACOB Njeru  ---------------------------------------------------------------------------------------------------------------    Patient seen and examined bedside    Subjective and Objective: No overnight events. Pt awake, appears comfortable.     Allergies: No Known Allergies      Hospital Medications:   MEDICATIONS  (STANDING):  amLODIPine   Tablet 10 milliGRAM(s) Oral daily  aspirin enteric coated 81 milliGRAM(s) Oral daily  dextrose 5%. 1000 milliLiter(s) (50 mL/Hr) IV Continuous <Continuous>  dextrose 50% Injectable 12.5 Gram(s) IV Push once  dextrose 50% Injectable 25 Gram(s) IV Push once  dextrose 50% Injectable 25 Gram(s) IV Push once  diVALproex Sprinkle 125 milliGRAM(s) Oral two times a day  heparin  Injectable 5000 Unit(s) SubCutaneous every 8 hours  insulin lispro (HumaLOG) corrective regimen sliding scale   SubCutaneous three times a day before meals  insulin lispro (HumaLOG) corrective regimen sliding scale   SubCutaneous at bedtime  memantine 10 milliGRAM(s) Oral daily  metoprolol tartrate 12.5 milliGRAM(s) Oral two times a day  multivitamin 1 Tablet(s) Oral daily  sodium bicarbonate 650 milliGRAM(s) Oral three times a day  sodium chloride 0.9%. 1000 milliLiter(s) (60 mL/Hr) IV Continuous <Continuous>      VITALS:  T(F): 97.9 (01-31-20 @ 12:10), Max: 98.7 (01-31-20 @ 06:26)  HR: 70 (01-31-20 @ 12:10)  BP: 139/77 (01-31-20 @ 12:10)  RR: 17 (01-31-20 @ 12:10)  SpO2: 96% (01-31-20 @ 12:10)  Wt(kg): --    01-30 @ 07:01  -  01-31 @ 07:00  --------------------------------------------------------  IN: 894 mL / OUT: 1225 mL / NET: -331 mL      PHYSICAL EXAM:  Constitutional: NAD  HEENT: anicteric sclera, oropharynx clear  Neck: No JVD  Respiratory: CTAB, no wheezes, rales or rhonchi  Cardiovascular: S1, S2, RRR  Gastrointestinal: BS+, soft, NT/ND  Extremities: No cyanosis or clubbing. No peripheral edema  Neurological: A/O x 1  : murcia.   Skin: No rashes      LABS:  01-31    139  |  106  |  27<H>  ----------------------------<  150<H>  4.2   |  21<L>  |  1.27    Ca    9.8      31 Jan 2020 06:55  Phos  3.3     01-31  Mg     2.1     01-31      Creatinine Trend: 1.27 <--, 1.21 <--, 0.99 <--, 1.12 <--, 1.48 <--, 1.47 <--, 1.50 <--                        10.6   9.75  )-----------( 227      ( 31 Jan 2020 06:55 )             33.0     Urine Studies:        RADIOLOGY & ADDITIONAL STUDIES:

## 2020-01-31 NOTE — PROGRESS NOTE ADULT - SUBJECTIVE AND OBJECTIVE BOX
[facility]      Seen earlier today, and examined.  - Today, patient is without complaints.           *****MEDICATIONS: Current medication reviewed and documented.    MEDICATIONS  (STANDING):  amLODIPine   Tablet 10 milliGRAM(s) Oral daily  aspirin enteric coated 81 milliGRAM(s) Oral daily  dextrose 5%. 1000 milliLiter(s) (50 mL/Hr) IV Continuous <Continuous>  dextrose 50% Injectable 12.5 Gram(s) IV Push once  dextrose 50% Injectable 25 Gram(s) IV Push once  dextrose 50% Injectable 25 Gram(s) IV Push once  diVALproex Sprinkle 125 milliGRAM(s) Oral two times a day  heparin  Injectable 5000 Unit(s) SubCutaneous every 8 hours  insulin lispro (HumaLOG) corrective regimen sliding scale   SubCutaneous three times a day before meals  insulin lispro (HumaLOG) corrective regimen sliding scale   SubCutaneous at bedtime  memantine 10 milliGRAM(s) Oral daily  metoprolol tartrate 12.5 milliGRAM(s) Oral two times a day  multivitamin 1 Tablet(s) Oral daily  sodium bicarbonate 650 milliGRAM(s) Oral three times a day  sodium chloride 0.9%. 1000 milliLiter(s) (60 mL/Hr) IV Continuous <Continuous>    MEDICATIONS  (PRN):  acetaminophen   Tablet .. 650 milliGRAM(s) Oral every 6 hours PRN Temp greater or equal to 38C (100.4F), Mild Pain (1 - 3), Moderate Pain (4 - 6), Severe Pain (7 - 10)  bisacodyl 5 milliGRAM(s) Oral daily PRN Constipation  dextrose 40% Gel 15 Gram(s) Oral once PRN Blood Glucose LESS THAN 70 milliGRAM(s)/deciliter  glucagon  Injectable 1 milliGRAM(s) IntraMuscular once PRN Glucose LESS THAN 70 milligrams/deciliter  meclizine 25 milliGRAM(s) Oral two times a day PRN Dizziness  ondansetron Injectable 4 milliGRAM(s) IV Push every 6 hours PRN Nausea  senna 2 Tablet(s) Oral at bedtime PRN Constipation          ***** VITAL SIGNS:  T(F): 97.9 (01-31-20 @ 12:10), Max: 98.7 (01-31-20 @ 06:26)  HR: 70 (01-31-20 @ 12:10) (70 - 99)  BP: 139/77 (01-31-20 @ 12:10) (130/72 - 151/64)  RR: 17 (01-31-20 @ 12:10) (16 - 17)  SpO2: 96% (01-31-20 @ 12:10) (96% - 99%)  Wt(kg): --  ,   I&O's Summary    30 Jan 2020 07:01  -  31 Jan 2020 07:00  --------------------------------------------------------  IN: 894 mL / OUT: 1225 mL / NET: -331 mL    31 Jan 2020 07:01  -  31 Jan 2020 16:50  --------------------------------------------------------  IN: 300 mL / OUT: 200 mL / NET: 100 mL             *****PHYSICAL EXAM:     alert oriented x 1 attention comprehension are better appears pleasant   as per aide agitation improved   EOmi fundi not visualized   no nystagmus VFF to confrontation  Tongue is midline  Palate elevates symmetrically   Moving all 4 ext spontaneously no drift appreciated    Gait not assessed.   Gait not assessed.          *****LAB AND IMAGING:                        10.6   9.75  )-----------( 227      ( 31 Jan 2020 06:55 )             33.0               01-31    139  |  106  |  27<H>  ----------------------------<  150<H>  4.2   |  21<L>  |  1.27    Ca    9.8      31 Jan 2020 06:55  Phos  3.3     01-31  Mg     2.1     01-31                           [All pertinent recent Imaging/Reports reviewed]           *****A S S E S S M E N T   A N D   P L A N :         Excerpt from H&P  89 Creole speaking female, h/o dementia, DM2, HTN,  presents from aide for evaluation of AMS. Patient is a poor historian, AAOx2 at baseline and hard of hearing. Unable to obtain history. Patient denies symptoms. She reports that she was on a train [place] and is unable to recall how she ended up at the hospital. Unable to reach family members for collateral information.   Currently, only has mild back pain, denies any CP or SOB.         limited exam as pt is not cooperative.  Problem/Recommendations 1 Agitation improving likely sundowning due to dementia  depakote bid for beh management     Problem/Recommendations 2: lethargy improved.   avoid any sedation   ct head unremarkable   eeg did not reveal any sz          Thank you for allowing me to participate in the care of this patient. Please do not hesitate to call me if you have any  questions.        ________________  Stormy Chavira MD  [facility]  [phone]      33 minutes spent on total encounter; more than 50 % of the visit was  spent counseling about plan of care, compliance to diet/exercise and medication regimen and or  coordinating care by the attending physician.      It is advised that stroke patients follow up with DELILAH Callahan @ [phone] in 1- 2 weeks.   Others please follow up with Dr. Michael Nissenbaum [phone]

## 2020-02-01 LAB
ANION GAP SERPL CALC-SCNC: 11 MMO/L — SIGNIFICANT CHANGE UP (ref 7–14)
BUN SERPL-MCNC: 25 MG/DL — HIGH (ref 7–23)
CALCIUM SERPL-MCNC: 9.4 MG/DL — SIGNIFICANT CHANGE UP (ref 8.4–10.5)
CHLORIDE SERPL-SCNC: 105 MMOL/L — SIGNIFICANT CHANGE UP (ref 98–107)
CO2 SERPL-SCNC: 22 MMOL/L — SIGNIFICANT CHANGE UP (ref 22–31)
CREAT SERPL-MCNC: 1.15 MG/DL — SIGNIFICANT CHANGE UP (ref 0.5–1.3)
GLUCOSE SERPL-MCNC: 146 MG/DL — HIGH (ref 70–99)
HCT VFR BLD CALC: 32.9 % — LOW (ref 34.5–45)
HGB BLD-MCNC: 10.8 G/DL — LOW (ref 11.5–15.5)
MAGNESIUM SERPL-MCNC: 2 MG/DL — SIGNIFICANT CHANGE UP (ref 1.6–2.6)
MCHC RBC-ENTMCNC: 32.7 PG — SIGNIFICANT CHANGE UP (ref 27–34)
MCHC RBC-ENTMCNC: 32.8 % — SIGNIFICANT CHANGE UP (ref 32–36)
MCV RBC AUTO: 99.7 FL — SIGNIFICANT CHANGE UP (ref 80–100)
NRBC # FLD: 0.02 K/UL — SIGNIFICANT CHANGE UP (ref 0–0)
PHOSPHATE SERPL-MCNC: 3 MG/DL — SIGNIFICANT CHANGE UP (ref 2.5–4.5)
PLATELET # BLD AUTO: 240 K/UL — SIGNIFICANT CHANGE UP (ref 150–400)
PMV BLD: 10.7 FL — SIGNIFICANT CHANGE UP (ref 7–13)
POTASSIUM SERPL-MCNC: 4.2 MMOL/L — SIGNIFICANT CHANGE UP (ref 3.5–5.3)
POTASSIUM SERPL-SCNC: 4.2 MMOL/L — SIGNIFICANT CHANGE UP (ref 3.5–5.3)
RBC # BLD: 3.3 M/UL — LOW (ref 3.8–5.2)
RBC # FLD: 13.1 % — SIGNIFICANT CHANGE UP (ref 10.3–14.5)
SODIUM SERPL-SCNC: 138 MMOL/L — SIGNIFICANT CHANGE UP (ref 135–145)
WBC # BLD: 8.21 K/UL — SIGNIFICANT CHANGE UP (ref 3.8–10.5)
WBC # FLD AUTO: 8.21 K/UL — SIGNIFICANT CHANGE UP (ref 3.8–10.5)

## 2020-02-01 RX ADMIN — MEMANTINE HYDROCHLORIDE 10 MILLIGRAM(S): 10 TABLET ORAL at 12:34

## 2020-02-01 RX ADMIN — Medication 1 TABLET(S): at 12:35

## 2020-02-01 RX ADMIN — HEPARIN SODIUM 5000 UNIT(S): 5000 INJECTION INTRAVENOUS; SUBCUTANEOUS at 16:09

## 2020-02-01 RX ADMIN — HEPARIN SODIUM 5000 UNIT(S): 5000 INJECTION INTRAVENOUS; SUBCUTANEOUS at 06:21

## 2020-02-01 RX ADMIN — Medication 650 MILLIGRAM(S): at 20:32

## 2020-02-01 RX ADMIN — Medication 650 MILLIGRAM(S): at 06:21

## 2020-02-01 RX ADMIN — Medication 81 MILLIGRAM(S): at 12:34

## 2020-02-01 RX ADMIN — Medication 1: at 21:40

## 2020-02-01 RX ADMIN — Medication 650 MILLIGRAM(S): at 21:41

## 2020-02-01 RX ADMIN — HEPARIN SODIUM 5000 UNIT(S): 5000 INJECTION INTRAVENOUS; SUBCUTANEOUS at 21:41

## 2020-02-01 RX ADMIN — AMLODIPINE BESYLATE 10 MILLIGRAM(S): 2.5 TABLET ORAL at 06:20

## 2020-02-01 RX ADMIN — Medication 1: at 12:32

## 2020-02-01 RX ADMIN — Medication 12.5 MILLIGRAM(S): at 06:21

## 2020-02-01 RX ADMIN — Medication 650 MILLIGRAM(S): at 12:34

## 2020-02-01 RX ADMIN — Medication 12.5 MILLIGRAM(S): at 17:27

## 2020-02-01 RX ADMIN — DIVALPROEX SODIUM 125 MILLIGRAM(S): 500 TABLET, DELAYED RELEASE ORAL at 06:20

## 2020-02-01 RX ADMIN — DIVALPROEX SODIUM 125 MILLIGRAM(S): 500 TABLET, DELAYED RELEASE ORAL at 17:27

## 2020-02-01 RX ADMIN — Medication 650 MILLIGRAM(S): at 12:32

## 2020-02-01 RX ADMIN — Medication 650 MILLIGRAM(S): at 21:30

## 2020-02-01 RX ADMIN — Medication 650 MILLIGRAM(S): at 13:07

## 2020-02-01 NOTE — PROGRESS NOTE ADULT - SUBJECTIVE AND OBJECTIVE BOX
[facility] - MILKA Owen / MILKA Borrego / VANDANA Boyd/ MILKA Coughlin/ MILKA Hidalgo/ PUJA Livingston / ANNY Jacobs / NADEEN Hilliard  ---------------------------------------------------------------------------------------------------------------  seen and examined today for MAGALIE  Interval : NAD  VITALS:  T(F): 97.8 (02-01-20 @ 06:18), Max: 97.9 (01-31-20 @ 12:10)  HR: 68 (02-01-20 @ 06:18)  BP: 135/76 (02-01-20 @ 06:18)  RR: 17 (02-01-20 @ 06:18)  SpO2: 99% (02-01-20 @ 06:18)  Wt(kg): --    01-31 @ 07:01  -  02-01 @ 07:00  --------------------------------------------------------  IN: 990 mL / OUT: 1025 mL / NET: -35 mL      Physical Exam :-  Constitutional: NAD  Neck: Supple.  Respiratory: Bilateral equal breath sounds,  Cardiovascular: S1, S2 normal,  Gastrointestinal: Bowel Sounds present, soft, non tender.  Extremities: No edema  Neurological: Alert and Oriented x 1, no focal deficits  Psychiatric: Normal mood, normal affect  Data:-  Allergies :   No Known Allergies    Hospital Medications:   MEDICATIONS  (STANDING):  amLODIPine   Tablet 10 milliGRAM(s) Oral daily  aspirin enteric coated 81 milliGRAM(s) Oral daily  dextrose 5%. 1000 milliLiter(s) (50 mL/Hr) IV Continuous <Continuous>  dextrose 50% Injectable 12.5 Gram(s) IV Push once  dextrose 50% Injectable 25 Gram(s) IV Push once  dextrose 50% Injectable 25 Gram(s) IV Push once  diVALproex Sprinkle 125 milliGRAM(s) Oral two times a day  heparin  Injectable 5000 Unit(s) SubCutaneous every 8 hours  insulin lispro (HumaLOG) corrective regimen sliding scale   SubCutaneous three times a day before meals  insulin lispro (HumaLOG) corrective regimen sliding scale   SubCutaneous at bedtime  memantine 10 milliGRAM(s) Oral daily  metoprolol tartrate 12.5 milliGRAM(s) Oral two times a day  multivitamin 1 Tablet(s) Oral daily  sodium bicarbonate 650 milliGRAM(s) Oral three times a day  sodium chloride 0.9%. 1000 milliLiter(s) (60 mL/Hr) IV Continuous <Continuous>    02-01    138  |  105  |  25<H>  ----------------------------<  146<H>  4.2   |  22  |  1.15    Ca    9.4      01 Feb 2020 06:18  Phos  3.0     02-01  Mg     2.0     02-01      Creatinine Trend: 1.15 <--, 1.27 <--, 1.21 <--, 0.99 <--, 1.12 <--, 1.48 <--, 1.47 <--                        10.8   8.21  )-----------( 240      ( 01 Feb 2020 06:18 )             32.9

## 2020-02-01 NOTE — PROGRESS NOTE ADULT - SUBJECTIVE AND OBJECTIVE BOX
Patient is a 89y old  Female who presents with a chief complaint of MAGALIE (29 Jan 2020 14:20)      INTERVAL HPI/OVERNIGHT EVENTS:  T(C): 37.1 (02-01-20 @ 21:33), Max: 37.2 (02-01-20 @ 13:21)  HR: 81 (02-01-20 @ 21:33) (68 - 94)  BP: 130/60 (02-01-20 @ 21:33) (130/60 - 148/88)  RR: 18 (02-01-20 @ 21:33) (15 - 18)  SpO2: 99% (02-01-20 @ 21:33) (98% - 99%)  Wt(kg): --  I&O's Summary    31 Jan 2020 07:01  -  01 Feb 2020 07:00  --------------------------------------------------------  IN: 990 mL / OUT: 1025 mL / NET: -35 mL    01 Feb 2020 07:01  -  01 Feb 2020 23:24  --------------------------------------------------------  IN: 916 mL / OUT: 775 mL / NET: 141 mL        PAST MEDICAL & SURGICAL HISTORY:  Obesity  Anemia  Dementia  OA (osteoarthritis)  Chronic low back pain  Shingles  DM (diabetes mellitus)  HTN (hypertension)  S/P hysterectomy with oophorectomy  Uterine fibroid      SOCIAL HISTORY  Alcohol:  Tobacco:  Illicit substance use:    FAMILY HISTORY:    REVIEW OF SYSTEMS:  CONSTITUTIONAL: No fever, weight loss, or fatigue  EYES: No eye pain, visual disturbances, or discharge  ENMT:  No difficulty hearing, tinnitus, vertigo; No sinus or throat pain  NECK: No pain or stiffness  RESPIRATORY: No cough, wheezing, chills or hemoptysis; No shortness of breath  CARDIOVASCULAR: No chest pain, palpitations, dizziness, or leg swelling  GASTROINTESTINAL: No abdominal or epigastric pain. No nausea, vomiting, or hematemesis; No diarrhea or constipation. No melena or hematochezia.  GENITOURINARY: No dysuria, frequency, hematuria, or incontinence  NEUROLOGICAL: No headaches, memory loss, loss of strength, numbness, or tremors  SKIN: No itching, burning, rashes, or lesions   LYMPH NODES: No enlarged glands  ENDOCRINE: No heat or cold intolerance; No hair loss  MUSCULOSKELETAL: No joint pain or swelling; No muscle, back, or extremity pain  PSYCHIATRIC: No depression, anxiety, mood swings, or difficulty sleeping  HEME/LYMPH: No easy bruising, or bleeding gums  ALLERY AND IMMUNOLOGIC: No hives or eczema    RADIOLOGY & ADDITIONAL TESTS:    Imaging Personally Reviewed:  [ ] YES  [ ] NO    Consultant(s) Notes Reviewed:  [ ] YES  [ ] NO    PHYSICAL EXAM:  GENERAL: NAD, well-groomed, well-developed  HEAD:  Atraumatic, Normocephalic  EYES: EOMI, PERRLA, conjunctiva and sclera clear  ENMT: No tonsillar erythema, exudates, or enlargement; Moist mucous membranes, Good dentition, No lesions  NECK: Supple, No JVD, Normal thyroid  NERVOUS SYSTEM:  Alert & Oriented X3, Good concentration; Motor Strength 5/5 B/L upper and lower extremities; DTRs 2+ intact and symmetric  CHEST/LUNG: Clear to percussion bilaterally; No rales, rhonchi, wheezing, or rubs  HEART: Regular rate and rhythm; No murmurs, rubs, or gallops  ABDOMEN: Soft, Nontender, Nondistended; Bowel sounds present  EXTREMITIES:  2+ Peripheral Pulses, No clubbing, cyanosis, or edema  LYMPH: No lymphadenopathy noted  SKIN: No rashes or lesions    LABS:                        10.8   8.21  )-----------( 240      ( 01 Feb 2020 06:18 )             32.9     02-01    138  |  105  |  25<H>  ----------------------------<  146<H>  4.2   |  22  |  1.15    Ca    9.4      01 Feb 2020 06:18  Phos  3.0     02-01  Mg     2.0     02-01          CAPILLARY BLOOD GLUCOSE      POCT Blood Glucose.: 259 mg/dL (01 Feb 2020 20:56)  POCT Blood Glucose.: 133 mg/dL (01 Feb 2020 17:06)  POCT Blood Glucose.: 153 mg/dL (01 Feb 2020 12:22)  POCT Blood Glucose.: 138 mg/dL (01 Feb 2020 07:36)            MEDICATIONS  (STANDING):  amLODIPine   Tablet 10 milliGRAM(s) Oral daily  aspirin enteric coated 81 milliGRAM(s) Oral daily  dextrose 5%. 1000 milliLiter(s) (50 mL/Hr) IV Continuous <Continuous>  dextrose 50% Injectable 12.5 Gram(s) IV Push once  dextrose 50% Injectable 25 Gram(s) IV Push once  dextrose 50% Injectable 25 Gram(s) IV Push once  diVALproex Sprinkle 125 milliGRAM(s) Oral two times a day  heparin  Injectable 5000 Unit(s) SubCutaneous every 8 hours  insulin lispro (HumaLOG) corrective regimen sliding scale   SubCutaneous three times a day before meals  insulin lispro (HumaLOG) corrective regimen sliding scale   SubCutaneous at bedtime  memantine 10 milliGRAM(s) Oral daily  metoprolol tartrate 12.5 milliGRAM(s) Oral two times a day  multivitamin 1 Tablet(s) Oral daily  sodium bicarbonate 650 milliGRAM(s) Oral three times a day  sodium chloride 0.9%. 1000 milliLiter(s) (60 mL/Hr) IV Continuous <Continuous>    MEDICATIONS  (PRN):  acetaminophen   Tablet .. 650 milliGRAM(s) Oral every 6 hours PRN Temp greater or equal to 38C (100.4F), Mild Pain (1 - 3), Moderate Pain (4 - 6), Severe Pain (7 - 10)  bisacodyl 5 milliGRAM(s) Oral daily PRN Constipation  dextrose 40% Gel 15 Gram(s) Oral once PRN Blood Glucose LESS THAN 70 milliGRAM(s)/deciliter  glucagon  Injectable 1 milliGRAM(s) IntraMuscular once PRN Glucose LESS THAN 70 milligrams/deciliter  meclizine 25 milliGRAM(s) Oral two times a day PRN Dizziness  ondansetron Injectable 4 milliGRAM(s) IV Push every 6 hours PRN Nausea  senna 2 Tablet(s) Oral at bedtime PRN Constipation      Care Discussed with Consultants/Other Providers [ ] YES  [ ] NO

## 2020-02-01 NOTE — PROGRESS NOTE ADULT - ASSESSMENT
89 Creole speaking female, h/o dementia, DM2, HTN,  [facility]. Renal following for MAGALIE.     MAGALIE on CKD 3 b/l Cr 1.1-1.3 07/2019  MAGALIE pre-renal azotemia vs post renal -> resolved  clinically euvolemic, cr improved and relatively stable now at 1.2  no hydro on renal sono    HTN-controlled  DM- Mx per primary team

## 2020-02-02 LAB
ANION GAP SERPL CALC-SCNC: 13 MMO/L — SIGNIFICANT CHANGE UP (ref 7–14)
BUN SERPL-MCNC: 27 MG/DL — HIGH (ref 7–23)
CALCIUM SERPL-MCNC: 10.1 MG/DL — SIGNIFICANT CHANGE UP (ref 8.4–10.5)
CHLORIDE SERPL-SCNC: 106 MMOL/L — SIGNIFICANT CHANGE UP (ref 98–107)
CO2 SERPL-SCNC: 22 MMOL/L — SIGNIFICANT CHANGE UP (ref 22–31)
CREAT SERPL-MCNC: 1.3 MG/DL — SIGNIFICANT CHANGE UP (ref 0.5–1.3)
GLUCOSE SERPL-MCNC: 174 MG/DL — HIGH (ref 70–99)
HCT VFR BLD CALC: 31.5 % — LOW (ref 34.5–45)
HGB BLD-MCNC: 10.4 G/DL — LOW (ref 11.5–15.5)
MAGNESIUM SERPL-MCNC: 2.1 MG/DL — SIGNIFICANT CHANGE UP (ref 1.6–2.6)
MCHC RBC-ENTMCNC: 32.7 PG — SIGNIFICANT CHANGE UP (ref 27–34)
MCHC RBC-ENTMCNC: 33 % — SIGNIFICANT CHANGE UP (ref 32–36)
MCV RBC AUTO: 99.1 FL — SIGNIFICANT CHANGE UP (ref 80–100)
NRBC # FLD: 0 K/UL — SIGNIFICANT CHANGE UP (ref 0–0)
PHOSPHATE SERPL-MCNC: 2.9 MG/DL — SIGNIFICANT CHANGE UP (ref 2.5–4.5)
PLATELET # BLD AUTO: 264 K/UL — SIGNIFICANT CHANGE UP (ref 150–400)
PMV BLD: 11.1 FL — SIGNIFICANT CHANGE UP (ref 7–13)
POTASSIUM SERPL-MCNC: 4.2 MMOL/L — SIGNIFICANT CHANGE UP (ref 3.5–5.3)
POTASSIUM SERPL-SCNC: 4.2 MMOL/L — SIGNIFICANT CHANGE UP (ref 3.5–5.3)
RBC # BLD: 3.18 M/UL — LOW (ref 3.8–5.2)
RBC # FLD: 13 % — SIGNIFICANT CHANGE UP (ref 10.3–14.5)
SODIUM SERPL-SCNC: 141 MMOL/L — SIGNIFICANT CHANGE UP (ref 135–145)
WBC # BLD: 8.26 K/UL — SIGNIFICANT CHANGE UP (ref 3.8–10.5)
WBC # FLD AUTO: 8.26 K/UL — SIGNIFICANT CHANGE UP (ref 3.8–10.5)

## 2020-02-02 RX ORDER — HALOPERIDOL DECANOATE 100 MG/ML
0.5 INJECTION INTRAMUSCULAR ONCE
Refills: 0 | Status: COMPLETED | OUTPATIENT
Start: 2020-02-02 | End: 2020-02-02

## 2020-02-02 RX ADMIN — Medication 12.5 MILLIGRAM(S): at 05:35

## 2020-02-02 RX ADMIN — Medication 1 TABLET(S): at 13:08

## 2020-02-02 RX ADMIN — Medication 12.5 MILLIGRAM(S): at 17:16

## 2020-02-02 RX ADMIN — DIVALPROEX SODIUM 125 MILLIGRAM(S): 500 TABLET, DELAYED RELEASE ORAL at 17:16

## 2020-02-02 RX ADMIN — Medication 650 MILLIGRAM(S): at 13:08

## 2020-02-02 RX ADMIN — Medication 650 MILLIGRAM(S): at 05:35

## 2020-02-02 RX ADMIN — Medication 81 MILLIGRAM(S): at 13:08

## 2020-02-02 RX ADMIN — HEPARIN SODIUM 5000 UNIT(S): 5000 INJECTION INTRAVENOUS; SUBCUTANEOUS at 05:41

## 2020-02-02 RX ADMIN — DIVALPROEX SODIUM 125 MILLIGRAM(S): 500 TABLET, DELAYED RELEASE ORAL at 05:35

## 2020-02-02 RX ADMIN — Medication 1: at 17:16

## 2020-02-02 RX ADMIN — MEMANTINE HYDROCHLORIDE 10 MILLIGRAM(S): 10 TABLET ORAL at 13:08

## 2020-02-02 RX ADMIN — HEPARIN SODIUM 5000 UNIT(S): 5000 INJECTION INTRAVENOUS; SUBCUTANEOUS at 13:08

## 2020-02-02 RX ADMIN — HALOPERIDOL DECANOATE 0.5 MILLIGRAM(S): 100 INJECTION INTRAMUSCULAR at 09:23

## 2020-02-02 RX ADMIN — Medication 1: at 07:47

## 2020-02-02 RX ADMIN — AMLODIPINE BESYLATE 10 MILLIGRAM(S): 2.5 TABLET ORAL at 05:35

## 2020-02-02 RX ADMIN — HEPARIN SODIUM 5000 UNIT(S): 5000 INJECTION INTRAVENOUS; SUBCUTANEOUS at 22:03

## 2020-02-02 RX ADMIN — Medication 650 MILLIGRAM(S): at 22:03

## 2020-02-02 NOTE — PROGRESS NOTE ADULT - SUBJECTIVE AND OBJECTIVE BOX
Patient is a 89y old  Female who presents with a chief complaint of MAGALIE (29 Jan 2020 14:20)    pt seen and examined    INTERVAL HPI/OVERNIGHT EVENTS:  T(C): 36.7 (02-02-20 @ 21:20), Max: 36.7 (02-02-20 @ 05:30)  HR: 72 (02-02-20 @ 21:20) (72 - 90)  BP: 137/65 (02-02-20 @ 21:20) (137/65 - 147/72)  RR: 18 (02-02-20 @ 21:20) (17 - 18)  SpO2: 98% (02-02-20 @ 21:20) (97% - 100%)  Wt(kg): --  I&O's Summary    01 Feb 2020 07:01  -  02 Feb 2020 07:00  --------------------------------------------------------  IN: 1191 mL / OUT: 1855 mL / NET: -664 mL    02 Feb 2020 07:01  -  02 Feb 2020 23:58  --------------------------------------------------------  IN: 600 mL / OUT: 1125 mL / NET: -525 mL        PAST MEDICAL & SURGICAL HISTORY:  Obesity  Anemia  Dementia  OA (osteoarthritis)  Chronic low back pain  Shingles  DM (diabetes mellitus)  HTN (hypertension)  S/P hysterectomy with oophorectomy  Uterine fibroid      SOCIAL HISTORY  Alcohol:  Tobacco:  Illicit substance use:    FAMILY HISTORY:    REVIEW OF SYSTEMS:  CONSTITUTIONAL: No fever, weight loss, or fatigue  EYES: No eye pain, visual disturbances, or discharge  ENMT:  No difficulty hearing, tinnitus, vertigo; No sinus or throat pain  NECK: No pain or stiffness  RESPIRATORY: No cough, wheezing, chills or hemoptysis; No shortness of breath  CARDIOVASCULAR: No chest pain, palpitations, dizziness, or leg swelling  GASTROINTESTINAL: No abdominal or epigastric pain. No nausea, vomiting, or hematemesis; No diarrhea or constipation. No melena or hematochezia.  GENITOURINARY: No dysuria, frequency, hematuria, or incontinence  NEUROLOGICAL: No headaches, memory loss, loss of strength, numbness, or tremors  SKIN: No itching, burning, rashes, or lesions   LYMPH NODES: No enlarged glands  ENDOCRINE: No heat or cold intolerance; No hair loss  MUSCULOSKELETAL: No joint pain or swelling; No muscle, back, or extremity pain  PSYCHIATRIC: No depression, anxiety, mood swings, or difficulty sleeping  HEME/LYMPH: No easy bruising, or bleeding gums  ALLERY AND IMMUNOLOGIC: No hives or eczema    RADIOLOGY & ADDITIONAL TESTS:    Imaging Personally Reviewed:  [ ] YES  [ ] NO    Consultant(s) Notes Reviewed:  [ ] YES  [ ] NO    PHYSICAL EXAM:  GENERAL: NAD, well-groomed, well-developed  HEAD:  Atraumatic, Normocephalic  EYES: EOMI, PERRLA, conjunctiva and sclera clear  ENMT: No tonsillar erythema, exudates, or enlargement; Moist mucous membranes, Good dentition, No lesions  NECK: Supple, No JVD, Normal thyroid  NERVOUS SYSTEM:  Alert & Oriented X3, Good concentration; Motor Strength 5/5 B/L upper and lower extremities; DTRs 2+ intact and symmetric  CHEST/LUNG: Clear to percussion bilaterally; No rales, rhonchi, wheezing, or rubs  HEART: Regular rate and rhythm; No murmurs, rubs, or gallops  ABDOMEN: Soft, Nontender, Nondistended; Bowel sounds present  EXTREMITIES:  2+ Peripheral Pulses, No clubbing, cyanosis, or edema  LYMPH: No lymphadenopathy noted  SKIN: No rashes or lesions    LABS:                        10.4   8.26  )-----------( 264      ( 02 Feb 2020 07:18 )             31.5     02-02    141  |  106  |  27<H>  ----------------------------<  174<H>  4.2   |  22  |  1.30    Ca    10.1      02 Feb 2020 07:18  Phos  2.9     02-02  Mg     2.1     02-02          CAPILLARY BLOOD GLUCOSE      POCT Blood Glucose.: 154 mg/dL (02 Feb 2020 21:02)  POCT Blood Glucose.: 161 mg/dL (02 Feb 2020 16:59)  POCT Blood Glucose.: 142 mg/dL (02 Feb 2020 11:48)  POCT Blood Glucose.: 158 mg/dL (02 Feb 2020 07:34)            MEDICATIONS  (STANDING):  amLODIPine   Tablet 10 milliGRAM(s) Oral daily  aspirin enteric coated 81 milliGRAM(s) Oral daily  dextrose 5%. 1000 milliLiter(s) (50 mL/Hr) IV Continuous <Continuous>  dextrose 50% Injectable 12.5 Gram(s) IV Push once  dextrose 50% Injectable 25 Gram(s) IV Push once  dextrose 50% Injectable 25 Gram(s) IV Push once  diVALproex Sprinkle 125 milliGRAM(s) Oral two times a day  heparin  Injectable 5000 Unit(s) SubCutaneous every 8 hours  insulin lispro (HumaLOG) corrective regimen sliding scale   SubCutaneous three times a day before meals  insulin lispro (HumaLOG) corrective regimen sliding scale   SubCutaneous at bedtime  memantine 10 milliGRAM(s) Oral daily  metoprolol tartrate 12.5 milliGRAM(s) Oral two times a day  multivitamin 1 Tablet(s) Oral daily  sodium bicarbonate 650 milliGRAM(s) Oral three times a day  sodium chloride 0.9%. 1000 milliLiter(s) (60 mL/Hr) IV Continuous <Continuous>    MEDICATIONS  (PRN):  acetaminophen   Tablet .. 650 milliGRAM(s) Oral every 6 hours PRN Temp greater or equal to 38C (100.4F), Mild Pain (1 - 3), Moderate Pain (4 - 6), Severe Pain (7 - 10)  bisacodyl 5 milliGRAM(s) Oral daily PRN Constipation  dextrose 40% Gel 15 Gram(s) Oral once PRN Blood Glucose LESS THAN 70 milliGRAM(s)/deciliter  glucagon  Injectable 1 milliGRAM(s) IntraMuscular once PRN Glucose LESS THAN 70 milligrams/deciliter  meclizine 25 milliGRAM(s) Oral two times a day PRN Dizziness  ondansetron Injectable 4 milliGRAM(s) IV Push every 6 hours PRN Nausea  senna 2 Tablet(s) Oral at bedtime PRN Constipation      Care Discussed with Consultants/Other Providers [ ] YES  [ ] NO

## 2020-02-03 LAB
ANION GAP SERPL CALC-SCNC: 13 MMO/L — SIGNIFICANT CHANGE UP (ref 7–14)
BUN SERPL-MCNC: 33 MG/DL — HIGH (ref 7–23)
CALCIUM SERPL-MCNC: 9.8 MG/DL — SIGNIFICANT CHANGE UP (ref 8.4–10.5)
CHLORIDE SERPL-SCNC: 105 MMOL/L — SIGNIFICANT CHANGE UP (ref 98–107)
CO2 SERPL-SCNC: 21 MMOL/L — LOW (ref 22–31)
CREAT SERPL-MCNC: 1.35 MG/DL — HIGH (ref 0.5–1.3)
GLUCOSE SERPL-MCNC: 154 MG/DL — HIGH (ref 70–99)
HCT VFR BLD CALC: 35.2 % — SIGNIFICANT CHANGE UP (ref 34.5–45)
HGB BLD-MCNC: 11.2 G/DL — LOW (ref 11.5–15.5)
MAGNESIUM SERPL-MCNC: 2 MG/DL — SIGNIFICANT CHANGE UP (ref 1.6–2.6)
MCHC RBC-ENTMCNC: 31.8 % — LOW (ref 32–36)
MCHC RBC-ENTMCNC: 32.3 PG — SIGNIFICANT CHANGE UP (ref 27–34)
MCV RBC AUTO: 101.4 FL — HIGH (ref 80–100)
NRBC # FLD: 0 K/UL — SIGNIFICANT CHANGE UP (ref 0–0)
PHOSPHATE SERPL-MCNC: 3.4 MG/DL — SIGNIFICANT CHANGE UP (ref 2.5–4.5)
PLATELET # BLD AUTO: 245 K/UL — SIGNIFICANT CHANGE UP (ref 150–400)
PMV BLD: 10.8 FL — SIGNIFICANT CHANGE UP (ref 7–13)
POTASSIUM SERPL-MCNC: 4.2 MMOL/L — SIGNIFICANT CHANGE UP (ref 3.5–5.3)
POTASSIUM SERPL-SCNC: 4.2 MMOL/L — SIGNIFICANT CHANGE UP (ref 3.5–5.3)
RBC # BLD: 3.47 M/UL — LOW (ref 3.8–5.2)
RBC # FLD: 13.3 % — SIGNIFICANT CHANGE UP (ref 10.3–14.5)
SODIUM SERPL-SCNC: 139 MMOL/L — SIGNIFICANT CHANGE UP (ref 135–145)
WBC # BLD: 8.55 K/UL — SIGNIFICANT CHANGE UP (ref 3.8–10.5)
WBC # FLD AUTO: 8.55 K/UL — SIGNIFICANT CHANGE UP (ref 3.8–10.5)

## 2020-02-03 RX ADMIN — HEPARIN SODIUM 5000 UNIT(S): 5000 INJECTION INTRAVENOUS; SUBCUTANEOUS at 14:32

## 2020-02-03 RX ADMIN — HEPARIN SODIUM 5000 UNIT(S): 5000 INJECTION INTRAVENOUS; SUBCUTANEOUS at 06:50

## 2020-02-03 RX ADMIN — Medication 1 TABLET(S): at 14:32

## 2020-02-03 RX ADMIN — AMLODIPINE BESYLATE 10 MILLIGRAM(S): 2.5 TABLET ORAL at 06:50

## 2020-02-03 RX ADMIN — MEMANTINE HYDROCHLORIDE 10 MILLIGRAM(S): 10 TABLET ORAL at 14:33

## 2020-02-03 RX ADMIN — Medication 81 MILLIGRAM(S): at 14:33

## 2020-02-03 RX ADMIN — Medication 650 MILLIGRAM(S): at 22:03

## 2020-02-03 RX ADMIN — DIVALPROEX SODIUM 125 MILLIGRAM(S): 500 TABLET, DELAYED RELEASE ORAL at 17:47

## 2020-02-03 RX ADMIN — Medication 12.5 MILLIGRAM(S): at 17:47

## 2020-02-03 RX ADMIN — Medication 650 MILLIGRAM(S): at 06:50

## 2020-02-03 RX ADMIN — Medication 1: at 07:40

## 2020-02-03 RX ADMIN — HEPARIN SODIUM 5000 UNIT(S): 5000 INJECTION INTRAVENOUS; SUBCUTANEOUS at 22:03

## 2020-02-03 RX ADMIN — Medication 12.5 MILLIGRAM(S): at 06:50

## 2020-02-03 RX ADMIN — DIVALPROEX SODIUM 125 MILLIGRAM(S): 500 TABLET, DELAYED RELEASE ORAL at 06:50

## 2020-02-03 RX ADMIN — Medication 650 MILLIGRAM(S): at 14:32

## 2020-02-03 NOTE — CHART NOTE - NSCHARTNOTEFT_GEN_A_CORE
spoke to palliative team- they say in the past they spoke to family and scheduled meetings but family didn't show up to any of them. spoke to son Anselmo today said he is not able to come to meetings but can communicate only over the phone. paged palliative team to inform them to call the family and discuss GOC over the phone but didn't get a call back. please speak to palliative team in am.

## 2020-02-03 NOTE — PROGRESS NOTE ADULT - SUBJECTIVE AND OBJECTIVE BOX
Patient is a 89y old  Female who presents with a chief complaint of MAGALIE (29 Jan 2020 14:20)    pt. seen and examined, baseline dementia     INTERVAL HPI/OVERNIGHT EVENTS:  T(C): 36.7 (02-03-20 @ 22:00), Max: 36.7 (02-03-20 @ 11:47)  HR: 75 (02-03-20 @ 22:00) (70 - 76)  BP: 149/75 (02-03-20 @ 22:00) (134/74 - 156/82)  RR: 18 (02-03-20 @ 22:00) (15 - 19)  SpO2: 98% (02-03-20 @ 22:00) (79% - 100%)  Wt(kg): --  I&O's Summary    02 Feb 2020 07:01  -  03 Feb 2020 07:00  --------------------------------------------------------  IN: 900 mL / OUT: 1675 mL / NET: -775 mL    03 Feb 2020 07:01  -  03 Feb 2020 22:49  --------------------------------------------------------  IN: 558 mL / OUT: 550 mL / NET: 8 mL        PAST MEDICAL & SURGICAL HISTORY:  Obesity  Anemia  Dementia  OA (osteoarthritis)  Chronic low back pain  Shingles  DM (diabetes mellitus)  HTN (hypertension)  S/P hysterectomy with oophorectomy  Uterine fibroid      SOCIAL HISTORY  Alcohol:  Tobacco:  Illicit substance use:    FAMILY HISTORY:    REVIEW OF SYSTEMS:  CONSTITUTIONAL: No fever, weight loss, or fatigue  EYES: No eye pain, visual disturbances, or discharge  ENMT:  No difficulty hearing, tinnitus, vertigo; No sinus or throat pain  NECK: No pain or stiffness  RESPIRATORY: No cough, wheezing, chills or hemoptysis; No shortness of breath  CARDIOVASCULAR: No chest pain, palpitations, dizziness, or leg swelling  GASTROINTESTINAL: No abdominal or epigastric pain. No nausea, vomiting, or hematemesis; No diarrhea or constipation. No melena or hematochezia.  GENITOURINARY: No dysuria, frequency, hematuria, or incontinence  NEUROLOGICAL: No headaches, memory loss, loss of strength, numbness, or tremors  SKIN: No itching, burning, rashes, or lesions   LYMPH NODES: No enlarged glands  ENDOCRINE: No heat or cold intolerance; No hair loss  MUSCULOSKELETAL: No joint pain or swelling; No muscle, back, or extremity pain  PSYCHIATRIC: No depression, anxiety, mood swings, or difficulty sleeping  HEME/LYMPH: No easy bruising, or bleeding gums  ALLERY AND IMMUNOLOGIC: No hives or eczema    RADIOLOGY & ADDITIONAL TESTS:    Imaging Personally Reviewed:  [ ] YES  [ ] NO    Consultant(s) Notes Reviewed:  [ ] YES  [ ] NO    PHYSICAL EXAM:  GENERAL: NAD, well-groomed, well-developed  HEAD:  Atraumatic, Normocephalic  EYES: EOMI, PERRLA, conjunctiva and sclera clear  ENMT: No tonsillar erythema, exudates, or enlargement; Moist mucous membranes, Good dentition, No lesions  NECK: Supple, No JVD, Normal thyroid  NERVOUS SYSTEM:  Alert & Oriented X3, Good concentration; Motor Strength 5/5 B/L upper and lower extremities; DTRs 2+ intact and symmetric  CHEST/LUNG: Clear to percussion bilaterally; No rales, rhonchi, wheezing, or rubs  HEART: Regular rate and rhythm; No murmurs, rubs, or gallops  ABDOMEN: Soft, Nontender, Nondistended; Bowel sounds present  EXTREMITIES:  2+ Peripheral Pulses, No clubbing, cyanosis, or edema  LYMPH: No lymphadenopathy noted  SKIN: No rashes or lesions    LABS:                        11.2   8.55  )-----------( 245      ( 03 Feb 2020 03:25 )             35.2     02-03    139  |  105  |  33<H>  ----------------------------<  154<H>  4.2   |  21<L>  |  1.35<H>    Ca    9.8      03 Feb 2020 03:25  Phos  3.4     02-03  Mg     2.0     02-03          CAPILLARY BLOOD GLUCOSE      POCT Blood Glucose.: 163 mg/dL (03 Feb 2020 20:24)  POCT Blood Glucose.: 100 mg/dL (03 Feb 2020 16:56)  POCT Blood Glucose.: 134 mg/dL (03 Feb 2020 11:41)  POCT Blood Glucose.: 154 mg/dL (03 Feb 2020 07:34)            MEDICATIONS  (STANDING):  amLODIPine   Tablet 10 milliGRAM(s) Oral daily  aspirin enteric coated 81 milliGRAM(s) Oral daily  dextrose 5%. 1000 milliLiter(s) (50 mL/Hr) IV Continuous <Continuous>  dextrose 50% Injectable 12.5 Gram(s) IV Push once  dextrose 50% Injectable 25 Gram(s) IV Push once  dextrose 50% Injectable 25 Gram(s) IV Push once  diVALproex Sprinkle 125 milliGRAM(s) Oral two times a day  heparin  Injectable 5000 Unit(s) SubCutaneous every 8 hours  insulin lispro (HumaLOG) corrective regimen sliding scale   SubCutaneous three times a day before meals  insulin lispro (HumaLOG) corrective regimen sliding scale   SubCutaneous at bedtime  memantine 10 milliGRAM(s) Oral daily  metoprolol tartrate 12.5 milliGRAM(s) Oral two times a day  multivitamin 1 Tablet(s) Oral daily  sodium bicarbonate 650 milliGRAM(s) Oral three times a day  sodium chloride 0.9%. 1000 milliLiter(s) (60 mL/Hr) IV Continuous <Continuous>    MEDICATIONS  (PRN):  acetaminophen   Tablet .. 650 milliGRAM(s) Oral every 6 hours PRN Temp greater or equal to 38C (100.4F), Mild Pain (1 - 3), Moderate Pain (4 - 6), Severe Pain (7 - 10)  bisacodyl 5 milliGRAM(s) Oral daily PRN Constipation  dextrose 40% Gel 15 Gram(s) Oral once PRN Blood Glucose LESS THAN 70 milliGRAM(s)/deciliter  glucagon  Injectable 1 milliGRAM(s) IntraMuscular once PRN Glucose LESS THAN 70 milligrams/deciliter  meclizine 25 milliGRAM(s) Oral two times a day PRN Dizziness  ondansetron Injectable 4 milliGRAM(s) IV Push every 6 hours PRN Nausea  senna 2 Tablet(s) Oral at bedtime PRN Constipation      Care Discussed with Consultants/Other Providers [ ] YES  [ ] NO

## 2020-02-03 NOTE — PROGRESS NOTE ADULT - SUBJECTIVE AND OBJECTIVE BOX
Nephrology Followup Note - [phone] - Dr Borrego / Dr Owen / Dr Hidalgo / Dr Boyd / Dr Coughlin / Dr Hilliard / Dr Livingston / Dr Jacobs  Pt seen and examined at bedside  No acute events overnight. No complaints.     Allergies:  No Known Allergies    Hospital Medications:   MEDICATIONS  (STANDING):  amLODIPine   Tablet 10 milliGRAM(s) Oral daily  aspirin enteric coated 81 milliGRAM(s) Oral daily  dextrose 5%. 1000 milliLiter(s) (50 mL/Hr) IV Continuous <Continuous>  dextrose 50% Injectable 12.5 Gram(s) IV Push once  dextrose 50% Injectable 25 Gram(s) IV Push once  dextrose 50% Injectable 25 Gram(s) IV Push once  diVALproex Sprinkle 125 milliGRAM(s) Oral two times a day  heparin  Injectable 5000 Unit(s) SubCutaneous every 8 hours  insulin lispro (HumaLOG) corrective regimen sliding scale   SubCutaneous three times a day before meals  insulin lispro (HumaLOG) corrective regimen sliding scale   SubCutaneous at bedtime  memantine 10 milliGRAM(s) Oral daily  metoprolol tartrate 12.5 milliGRAM(s) Oral two times a day  multivitamin 1 Tablet(s) Oral daily  sodium bicarbonate 650 milliGRAM(s) Oral three times a day  sodium chloride 0.9%. 1000 milliLiter(s) (60 mL/Hr) IV Continuous <Continuous>      VITALS:  T(F): 98 (02-03-20 @ 11:47), Max: 98.1 (02-02-20 @ 21:20)  HR: 72 (02-03-20 @ 11:47)  BP: 134/74 (02-03-20 @ 11:47)  RR: 16 (02-03-20 @ 11:47)  SpO2: 98% (02-03-20 @ 11:47)  Wt(kg): --    02-02 @ 07:01  -  02-03 @ 07:00  --------------------------------------------------------  IN: 900 mL / OUT: 1675 mL / NET: -775 mL        PHYSICAL EXAM:  Constitutional: NAD  HEENT: anicteric sclera, oropharynx clear, MMM  Neck: No JVD  Respiratory: CTAB, no wheezes, rales or rhonchi  Cardiovascular: S1, S2, RRR  Gastrointestinal: BS+, soft, NT/ND  Extremities: No cyanosis or clubbing. No peripheral edema  Neurological: A/O x 1  : No CVA tenderness. No murcia.   Skin: No rashes    LABS:  02-03    139  |  105  |  33<H>  ----------------------------<  154<H>  4.2   |  21<L>  |  1.35<H>    Ca    9.8      03 Feb 2020 03:25  Phos  3.4     02-03  Mg     2.0     02-03      Creatinine Trend: 1.35 <--, 1.30 <--, 1.15 <--, 1.27 <--, 1.21 <--, 0.99 <--, 1.12 <--                        11.2   8.55  )-----------( 245      ( 03 Feb 2020 03:25 )             35.2     Urine Studies:      RADIOLOGY & ADDITIONAL STUDIES:

## 2020-02-03 NOTE — PROGRESS NOTE ADULT - ASSESSMENT
89 Creole speaking female, h/o dementia, DM2, HTN,  presented from aide for evaluation of AMS. Renal following for MAGALIE.     MAGALIE on CKD 3 b/l Cr 1.1-1.3 07/2019  MAGALIE pre-renal azotemia vs post renal -> resolved  clinically euvolemic, cr improved and relatively stable   no hydro on renal sono    HTN-controlled  DM- Mx per primary team  Dispo planning

## 2020-02-03 NOTE — PROGRESS NOTE ADULT - SUBJECTIVE AND OBJECTIVE BOX
[facility]      Seen earlier today, and examined.  - Today, patient is without complaints.           *****MEDICATIONS: Current medication reviewed and documented.    MEDICATIONS  (STANDING):  amLODIPine   Tablet 10 milliGRAM(s) Oral daily  aspirin enteric coated 81 milliGRAM(s) Oral daily  dextrose 5%. 1000 milliLiter(s) (50 mL/Hr) IV Continuous <Continuous>  dextrose 50% Injectable 12.5 Gram(s) IV Push once  dextrose 50% Injectable 25 Gram(s) IV Push once  dextrose 50% Injectable 25 Gram(s) IV Push once  diVALproex Sprinkle 125 milliGRAM(s) Oral two times a day  heparin  Injectable 5000 Unit(s) SubCutaneous every 8 hours  insulin lispro (HumaLOG) corrective regimen sliding scale   SubCutaneous three times a day before meals  insulin lispro (HumaLOG) corrective regimen sliding scale   SubCutaneous at bedtime  memantine 10 milliGRAM(s) Oral daily  metoprolol tartrate 12.5 milliGRAM(s) Oral two times a day  metroNIDAZOLE    Tablet 500 milliGRAM(s) Oral three times a day  multivitamin 1 Tablet(s) Oral daily  sodium bicarbonate 650 milliGRAM(s) Oral three times a day  sodium chloride 0.9%. 1000 milliLiter(s) (60 mL/Hr) IV Continuous <Continuous>    MEDICATIONS  (PRN):  acetaminophen   Tablet .. 650 milliGRAM(s) Oral every 6 hours PRN Temp greater or equal to 38C (100.4F), Mild Pain (1 - 3), Moderate Pain (4 - 6), Severe Pain (7 - 10)  bisacodyl 5 milliGRAM(s) Oral daily PRN Constipation  dextrose 40% Gel 15 Gram(s) Oral once PRN Blood Glucose LESS THAN 70 milliGRAM(s)/deciliter  glucagon  Injectable 1 milliGRAM(s) IntraMuscular once PRN Glucose LESS THAN 70 milligrams/deciliter  meclizine 25 milliGRAM(s) Oral two times a day PRN Dizziness  ondansetron Injectable 4 milliGRAM(s) IV Push every 6 hours PRN Nausea  senna 2 Tablet(s) Oral at bedtime PRN Constipation          ***** VITAL SIGNS:vss             *****PHYSICAL EXAM:    alert oriented x 2 attention comprehension are better appears pleasant   as per aide agitation improved   EOmi fundi not visualized   no nystagmus VFF to confrontation  Tongue is midline  Palate elevates symmetrically   Moving all 4 ext spontaneously no drift appreciated    Gait not assessed.                                      [All pertinent recent Imaging/Reports reviewed]           *****A S S E S S M E N T   A N D   P L A N :       Excerpt from H&P  89 Creole speaking female, h/o dementia, DM2, HTN,  presents from aide for evaluation of AMS. Patient is a poor historian, AAOx2 at baseline and hard of hearing. Unable to obtain history. Patient denies symptoms. She reports that she was on a train [place] and is unable to recall how she ended up at the hospital. Unable to reach family members for collateral information.   Currently, only has mild back pain, denies any CP or SOB.         limited exam as pt is not cooperative.  Problem/Recommendations 1 Agitation improving likely sundowning due to dementia  depakote bid for beh management     Problem/Recommendations 2: lethargy improved.   avoid any sedation   ct head unremarkable   eeg did not reveal any sz          Thank you for allowing me to participate in the care of this patient. Please do not hesitate to call me if you have any  questions.        ________________  Stormy Chavira MD  [facility] [place] ([facility]  [phone]      33 minutes spent on total encounter; more than 50 % of the visit was  spent counseling about plan of care, compliance to diet/exercise and medication regimen and or  coordinating care by the attending physician.      It is advised that stroke patients follow up with NP Jeanette Callahan @ [phone] in 1- 2 weeks.   Others please follow up with Dr. Michael Nissenbaum [phone]

## 2020-02-04 LAB
ANION GAP SERPL CALC-SCNC: 13 MMO/L — SIGNIFICANT CHANGE UP (ref 7–14)
BUN SERPL-MCNC: 30 MG/DL — HIGH (ref 7–23)
CALCIUM SERPL-MCNC: 9.9 MG/DL — SIGNIFICANT CHANGE UP (ref 8.4–10.5)
CHLORIDE SERPL-SCNC: 104 MMOL/L — SIGNIFICANT CHANGE UP (ref 98–107)
CO2 SERPL-SCNC: 20 MMOL/L — LOW (ref 22–31)
CREAT SERPL-MCNC: 1.19 MG/DL — SIGNIFICANT CHANGE UP (ref 0.5–1.3)
GLUCOSE SERPL-MCNC: 247 MG/DL — HIGH (ref 70–99)
HCT VFR BLD CALC: 36.8 % — SIGNIFICANT CHANGE UP (ref 34.5–45)
HGB BLD-MCNC: 11.3 G/DL — LOW (ref 11.5–15.5)
MCHC RBC-ENTMCNC: 30.7 % — LOW (ref 32–36)
MCHC RBC-ENTMCNC: 31.9 PG — SIGNIFICANT CHANGE UP (ref 27–34)
MCV RBC AUTO: 104 FL — HIGH (ref 80–100)
NRBC # FLD: 0 K/UL — SIGNIFICANT CHANGE UP (ref 0–0)
PLATELET # BLD AUTO: 199 K/UL — SIGNIFICANT CHANGE UP (ref 150–400)
PMV BLD: 11.2 FL — SIGNIFICANT CHANGE UP (ref 7–13)
POTASSIUM SERPL-MCNC: 4.3 MMOL/L — SIGNIFICANT CHANGE UP (ref 3.5–5.3)
POTASSIUM SERPL-SCNC: 4.3 MMOL/L — SIGNIFICANT CHANGE UP (ref 3.5–5.3)
RBC # BLD: 3.54 M/UL — LOW (ref 3.8–5.2)
RBC # FLD: 13.4 % — SIGNIFICANT CHANGE UP (ref 10.3–14.5)
SODIUM SERPL-SCNC: 137 MMOL/L — SIGNIFICANT CHANGE UP (ref 135–145)
WBC # BLD: 9.3 K/UL — SIGNIFICANT CHANGE UP (ref 3.8–10.5)
WBC # FLD AUTO: 9.3 K/UL — SIGNIFICANT CHANGE UP (ref 3.8–10.5)

## 2020-02-04 RX ORDER — METRONIDAZOLE 500 MG
500 TABLET ORAL THREE TIMES A DAY
Refills: 0 | Status: DISCONTINUED | OUTPATIENT
Start: 2020-02-04 | End: 2020-02-07

## 2020-02-04 RX ORDER — METRONIDAZOLE 500 MG
500 TABLET ORAL
Refills: 0 | Status: DISCONTINUED | OUTPATIENT
Start: 2020-02-04 | End: 2020-02-04

## 2020-02-04 RX ADMIN — Medication 650 MILLIGRAM(S): at 21:19

## 2020-02-04 RX ADMIN — Medication 500 MILLIGRAM(S): at 21:19

## 2020-02-04 RX ADMIN — Medication 500 MILLIGRAM(S): at 13:10

## 2020-02-04 RX ADMIN — HEPARIN SODIUM 5000 UNIT(S): 5000 INJECTION INTRAVENOUS; SUBCUTANEOUS at 13:11

## 2020-02-04 RX ADMIN — DIVALPROEX SODIUM 125 MILLIGRAM(S): 500 TABLET, DELAYED RELEASE ORAL at 17:21

## 2020-02-04 RX ADMIN — Medication 1 TABLET(S): at 13:11

## 2020-02-04 RX ADMIN — DIVALPROEX SODIUM 125 MILLIGRAM(S): 500 TABLET, DELAYED RELEASE ORAL at 06:48

## 2020-02-04 RX ADMIN — Medication 1: at 17:20

## 2020-02-04 RX ADMIN — Medication 1: at 12:14

## 2020-02-04 RX ADMIN — MEMANTINE HYDROCHLORIDE 10 MILLIGRAM(S): 10 TABLET ORAL at 13:11

## 2020-02-04 RX ADMIN — AMLODIPINE BESYLATE 10 MILLIGRAM(S): 2.5 TABLET ORAL at 06:48

## 2020-02-04 RX ADMIN — HEPARIN SODIUM 5000 UNIT(S): 5000 INJECTION INTRAVENOUS; SUBCUTANEOUS at 21:19

## 2020-02-04 RX ADMIN — Medication 650 MILLIGRAM(S): at 13:11

## 2020-02-04 RX ADMIN — Medication 12.5 MILLIGRAM(S): at 17:21

## 2020-02-04 RX ADMIN — Medication 81 MILLIGRAM(S): at 13:11

## 2020-02-04 RX ADMIN — Medication 500 MILLIGRAM(S): at 06:52

## 2020-02-04 RX ADMIN — Medication 12.5 MILLIGRAM(S): at 06:48

## 2020-02-04 RX ADMIN — Medication 650 MILLIGRAM(S): at 06:49

## 2020-02-04 RX ADMIN — HEPARIN SODIUM 5000 UNIT(S): 5000 INJECTION INTRAVENOUS; SUBCUTANEOUS at 06:49

## 2020-02-04 NOTE — PROGRESS NOTE ADULT - SUBJECTIVE AND OBJECTIVE BOX
Pt seen and examined at bedside  feels fairly well  no compls  eating well  no new events      Allergies:  No Known Allergies    Hospital Medications:   MEDICATIONS  (STANDING):  amLODIPine   Tablet 10 milliGRAM(s) Oral daily  aspirin enteric coated 81 milliGRAM(s) Oral daily  dextrose 5%. 1000 milliLiter(s) (50 mL/Hr) IV Continuous <Continuous>  dextrose 50% Injectable 12.5 Gram(s) IV Push once  dextrose 50% Injectable 25 Gram(s) IV Push once  dextrose 50% Injectable 25 Gram(s) IV Push once  diVALproex Sprinkle 125 milliGRAM(s) Oral two times a day  heparin  Injectable 5000 Unit(s) SubCutaneous every 8 hours  insulin lispro (HumaLOG) corrective regimen sliding scale   SubCutaneous three times a day before meals  insulin lispro (HumaLOG) corrective regimen sliding scale   SubCutaneous at bedtime  memantine 10 milliGRAM(s) Oral daily  metoprolol tartrate 12.5 milliGRAM(s) Oral two times a day  metroNIDAZOLE    Tablet 500 milliGRAM(s) Oral three times a day  multivitamin 1 Tablet(s) Oral daily  sodium bicarbonate 650 milliGRAM(s) Oral three times a day  sodium chloride 0.9%. 1000 milliLiter(s) (60 mL/Hr) IV Continuous <Continuous>            VITALS:  T(F): 98.5 (02-04-20 @ 06:00), Max: 98.5 (02-04-20 @ 06:00)  HR: 79 (02-04-20 @ 06:00)  BP: 157/79 (02-04-20 @ 06:00)  RR: 18 (02-04-20 @ 06:00)  SpO2: 100% (02-04-20 @ 06:00)  Wt(kg): --    02-03 @ 07:01  -  02-04 @ 07:00  --------------------------------------------------------  IN: 858 mL / OUT: 1050 mL / NET: -192 mL    02-04 @ 07:01  -  02-04 @ 11:48  --------------------------------------------------------  IN: 240 mL / OUT: 800 mL / NET: -560 mL        PHYSICAL EXAM:  Constitutional: NAD  HEENT: anicteric sclera, oropharynx clear, MMM  Neck: No JVD  Respiratory: CTAB, no wheezes, rales or rhonchi  Cardiovascular: S1, S2, RRR  Gastrointestinal: BS+, soft, NT/ND  Extremities: No cyanosis or clubbing. No peripheral edema  Neurological: A/O x 3, no focal deficits  Psychiatric: Normal mood, normal affect  : No CVA tenderness. No murcia.   Skin: No rashes       LABS:  02-04    137  |  104  |  30<H>  ----------------------------<  247<H>  4.3   |  20<L>  |  1.19    Ca    9.9      04 Feb 2020 05:44  Phos  3.4     02-03  Mg     2.0     02-03      Creatinine Trend: 1.19 <--, 1.35 <--, 1.30 <--, 1.15 <--, 1.27 <--, 1.21 <--, 0.99 <--                        11.3   9.30  )-----------( 199      ( 04 Feb 2020 05:44 )             36.8     Urine Studies:      RADIOLOGY & ADDITIONAL STUDIES:

## 2020-02-04 NOTE — PROGRESS NOTE ADULT - ASSESSMENT
89 Creole speaking female, h/o dementia, DM2, HTN,  presented from aide for evaluation of AMS. Renal following for MAGALIE.     AMGALIE on CKD 3 b/l Cr 1.1-1.3 07/2019  MAGALIE pre-renal azotemia vs post renal -> resolved  clinically euvolemic, cr improved and relatively stable   no hydro on renal sono    HTN-controlled  DM- Mx per primary team  Dispo planning

## 2020-02-04 NOTE — CHART NOTE - NSCHARTNOTEFT_GEN_A_CORE
RN reports patient with creamy malodorous vaginal discharge with associated itching in the groin.  Hamilton in place with no reported discharge in tubing.  Will start on empiric dosing of Flagyl for possible BV.  Discuss with team in AM, consider GYN for further evaluation.

## 2020-02-04 NOTE — PROGRESS NOTE ADULT - SUBJECTIVE AND OBJECTIVE BOX
Patient is a 89y old  Female who presents with a chief complaint of MAGALIE (29 Jan 2020 14:20)    pt. seen and examined, adv dementia     INTERVAL HPI/OVERNIGHT EVENTS:  T(C): 36.7 (02-04-20 @ 12:45), Max: 36.9 (02-04-20 @ 06:00)  HR: 98 (02-04-20 @ 17:20) (71 - 98)  BP: 143/94 (02-04-20 @ 17:20) (132/69 - 157/79)  RR: 18 (02-04-20 @ 12:45) (18 - 18)  SpO2: 99% (02-04-20 @ 12:45) (98% - 100%)  Wt(kg): --  I&O's Summary    03 Feb 2020 07:01  -  04 Feb 2020 07:00  --------------------------------------------------------  IN: 858 mL / OUT: 1050 mL / NET: -192 mL    04 Feb 2020 07:01  -  04 Feb 2020 19:29  --------------------------------------------------------  IN: 840 mL / OUT: 1350 mL / NET: -510 mL        PAST MEDICAL & SURGICAL HISTORY:  Obesity  Anemia  Dementia  OA (osteoarthritis)  Chronic low back pain  Shingles  DM (diabetes mellitus)  HTN (hypertension)  S/P hysterectomy with oophorectomy  Uterine fibroid      SOCIAL HISTORY  Alcohol:  Tobacco:  Illicit substance use:    FAMILY HISTORY:    REVIEW OF SYSTEMS:  CONSTITUTIONAL: No fever, weight loss, or fatigue  EYES: No eye pain, visual disturbances, or discharge  ENMT:  No difficulty hearing, tinnitus, vertigo; No sinus or throat pain  NECK: No pain or stiffness  RESPIRATORY: No cough, wheezing, chills or hemoptysis; No shortness of breath  CARDIOVASCULAR: No chest pain, palpitations, dizziness, or leg swelling  GASTROINTESTINAL: No abdominal or epigastric pain. No nausea, vomiting, or hematemesis; No diarrhea or constipation. No melena or hematochezia.  GENITOURINARY: No dysuria, frequency, hematuria, or incontinence  NEUROLOGICAL: No headaches, memory loss, loss of strength, numbness, or tremors  SKIN: No itching, burning, rashes, or lesions   LYMPH NODES: No enlarged glands  ENDOCRINE: No heat or cold intolerance; No hair loss  MUSCULOSKELETAL: No joint pain or swelling; No muscle, back, or extremity pain  PSYCHIATRIC: No depression, anxiety, mood swings, or difficulty sleeping  HEME/LYMPH: No easy bruising, or bleeding gums  ALLERY AND IMMUNOLOGIC: No hives or eczema    RADIOLOGY & ADDITIONAL TESTS:    Imaging Personally Reviewed:  [ ] YES  [ ] NO    Consultant(s) Notes Reviewed:  [ ] YES  [ ] NO    PHYSICAL EXAM:  GENERAL: NAD, well-groomed, well-developed  HEAD:  Atraumatic, Normocephalic  EYES: EOMI, PERRLA, conjunctiva and sclera clear  ENMT: No tonsillar erythema, exudates, or enlargement; Moist mucous membranes, Good dentition, No lesions  NECK: Supple, No JVD, Normal thyroid  NERVOUS SYSTEM:  Alert & Oriented X3, Good concentration; Motor Strength 5/5 B/L upper and lower extremities; DTRs 2+ intact and symmetric  CHEST/LUNG: Clear to percussion bilaterally; No rales, rhonchi, wheezing, or rubs  HEART: Regular rate and rhythm; No murmurs, rubs, or gallops  ABDOMEN: Soft, Nontender, Nondistended; Bowel sounds present  EXTREMITIES:  2+ Peripheral Pulses, No clubbing, cyanosis, or edema  LYMPH: No lymphadenopathy noted  SKIN: No rashes or lesions    LABS:                        11.3   9.30  )-----------( 199      ( 04 Feb 2020 05:44 )             36.8     02-04    137  |  104  |  30<H>  ----------------------------<  247<H>  4.3   |  20<L>  |  1.19    Ca    9.9      04 Feb 2020 05:44  Phos  3.4     02-03  Mg     2.0     02-03          CAPILLARY BLOOD GLUCOSE      POCT Blood Glucose.: 167 mg/dL (04 Feb 2020 16:48)  POCT Blood Glucose.: 170 mg/dL (04 Feb 2020 11:55)  POCT Blood Glucose.: 136 mg/dL (04 Feb 2020 07:52)  POCT Blood Glucose.: 163 mg/dL (03 Feb 2020 20:24)            MEDICATIONS  (STANDING):  amLODIPine   Tablet 10 milliGRAM(s) Oral daily  aspirin enteric coated 81 milliGRAM(s) Oral daily  dextrose 5%. 1000 milliLiter(s) (50 mL/Hr) IV Continuous <Continuous>  dextrose 50% Injectable 12.5 Gram(s) IV Push once  dextrose 50% Injectable 25 Gram(s) IV Push once  dextrose 50% Injectable 25 Gram(s) IV Push once  diVALproex Sprinkle 125 milliGRAM(s) Oral two times a day  heparin  Injectable 5000 Unit(s) SubCutaneous every 8 hours  insulin lispro (HumaLOG) corrective regimen sliding scale   SubCutaneous three times a day before meals  insulin lispro (HumaLOG) corrective regimen sliding scale   SubCutaneous at bedtime  memantine 10 milliGRAM(s) Oral daily  metoprolol tartrate 12.5 milliGRAM(s) Oral two times a day  metroNIDAZOLE    Tablet 500 milliGRAM(s) Oral three times a day  multivitamin 1 Tablet(s) Oral daily  sodium bicarbonate 650 milliGRAM(s) Oral three times a day  sodium chloride 0.9%. 1000 milliLiter(s) (60 mL/Hr) IV Continuous <Continuous>    MEDICATIONS  (PRN):  acetaminophen   Tablet .. 650 milliGRAM(s) Oral every 6 hours PRN Temp greater or equal to 38C (100.4F), Mild Pain (1 - 3), Moderate Pain (4 - 6), Severe Pain (7 - 10)  bisacodyl 5 milliGRAM(s) Oral daily PRN Constipation  dextrose 40% Gel 15 Gram(s) Oral once PRN Blood Glucose LESS THAN 70 milliGRAM(s)/deciliter  glucagon  Injectable 1 milliGRAM(s) IntraMuscular once PRN Glucose LESS THAN 70 milligrams/deciliter  meclizine 25 milliGRAM(s) Oral two times a day PRN Dizziness  ondansetron Injectable 4 milliGRAM(s) IV Push every 6 hours PRN Nausea  senna 2 Tablet(s) Oral at bedtime PRN Constipation      Care Discussed with Consultants/Other Providers [ ] YES  [ ] NO

## 2020-02-05 ENCOUNTER — TRANSCRIPTION ENCOUNTER (OUTPATIENT)
Age: 85
End: 2020-02-05

## 2020-02-05 DIAGNOSIS — R33.9 RETENTION OF URINE, UNSPECIFIED: ICD-10-CM

## 2020-02-05 RX ORDER — POLYETHYLENE GLYCOL 3350 17 G/17G
17 POWDER, FOR SOLUTION ORAL DAILY
Refills: 0 | Status: DISCONTINUED | OUTPATIENT
Start: 2020-02-05 | End: 2020-02-13

## 2020-02-05 RX ORDER — TAMSULOSIN HYDROCHLORIDE 0.4 MG/1
0.4 CAPSULE ORAL AT BEDTIME
Refills: 0 | Status: DISCONTINUED | OUTPATIENT
Start: 2020-02-05 | End: 2020-02-13

## 2020-02-05 RX ORDER — LANOLIN ALCOHOL/MO/W.PET/CERES
3 CREAM (GRAM) TOPICAL ONCE
Refills: 0 | Status: COMPLETED | OUTPATIENT
Start: 2020-02-05 | End: 2020-02-05

## 2020-02-05 RX ADMIN — AMLODIPINE BESYLATE 10 MILLIGRAM(S): 2.5 TABLET ORAL at 05:22

## 2020-02-05 RX ADMIN — HEPARIN SODIUM 5000 UNIT(S): 5000 INJECTION INTRAVENOUS; SUBCUTANEOUS at 22:06

## 2020-02-05 RX ADMIN — MEMANTINE HYDROCHLORIDE 10 MILLIGRAM(S): 10 TABLET ORAL at 13:14

## 2020-02-05 RX ADMIN — Medication 650 MILLIGRAM(S): at 14:10

## 2020-02-05 RX ADMIN — Medication 500 MILLIGRAM(S): at 05:21

## 2020-02-05 RX ADMIN — Medication 650 MILLIGRAM(S): at 22:06

## 2020-02-05 RX ADMIN — Medication 500 MILLIGRAM(S): at 22:06

## 2020-02-05 RX ADMIN — DIVALPROEX SODIUM 125 MILLIGRAM(S): 500 TABLET, DELAYED RELEASE ORAL at 05:21

## 2020-02-05 RX ADMIN — Medication 650 MILLIGRAM(S): at 05:22

## 2020-02-05 RX ADMIN — POLYETHYLENE GLYCOL 3350 17 GRAM(S): 17 POWDER, FOR SOLUTION ORAL at 17:33

## 2020-02-05 RX ADMIN — Medication 12.5 MILLIGRAM(S): at 05:21

## 2020-02-05 RX ADMIN — Medication 81 MILLIGRAM(S): at 13:13

## 2020-02-05 RX ADMIN — Medication 1 TABLET(S): at 13:13

## 2020-02-05 RX ADMIN — Medication 650 MILLIGRAM(S): at 13:13

## 2020-02-05 RX ADMIN — DIVALPROEX SODIUM 125 MILLIGRAM(S): 500 TABLET, DELAYED RELEASE ORAL at 17:30

## 2020-02-05 RX ADMIN — Medication 3 MILLIGRAM(S): at 00:58

## 2020-02-05 RX ADMIN — Medication 500 MILLIGRAM(S): at 13:13

## 2020-02-05 RX ADMIN — Medication 12.5 MILLIGRAM(S): at 17:31

## 2020-02-05 RX ADMIN — HEPARIN SODIUM 5000 UNIT(S): 5000 INJECTION INTRAVENOUS; SUBCUTANEOUS at 13:14

## 2020-02-05 RX ADMIN — TAMSULOSIN HYDROCHLORIDE 0.4 MILLIGRAM(S): 0.4 CAPSULE ORAL at 22:06

## 2020-02-05 RX ADMIN — Medication 1: at 12:22

## 2020-02-05 NOTE — PROGRESS NOTE ADULT - SUBJECTIVE AND OBJECTIVE BOX
Nephrology Followup Note - [phone] - Dr Borrego / Dr Owen / Dr Hidalgo / Dr Boyd / Dr Coughlin / Dr Hilliard / Dr Livingston / Dr Jacobs  Pt seen and examined at bedside  No acute events overnight.     Allergies:  No Known Allergies    Hospital Medications:   MEDICATIONS  (STANDING):  amLODIPine   Tablet 10 milliGRAM(s) Oral daily  aspirin enteric coated 81 milliGRAM(s) Oral daily  dextrose 5%. 1000 milliLiter(s) (50 mL/Hr) IV Continuous <Continuous>  dextrose 50% Injectable 12.5 Gram(s) IV Push once  dextrose 50% Injectable 25 Gram(s) IV Push once  dextrose 50% Injectable 25 Gram(s) IV Push once  diVALproex Sprinkle 125 milliGRAM(s) Oral two times a day  heparin  Injectable 5000 Unit(s) SubCutaneous every 8 hours  insulin lispro (HumaLOG) corrective regimen sliding scale   SubCutaneous three times a day before meals  insulin lispro (HumaLOG) corrective regimen sliding scale   SubCutaneous at bedtime  memantine 10 milliGRAM(s) Oral daily  metoprolol tartrate 12.5 milliGRAM(s) Oral two times a day  metroNIDAZOLE    Tablet 500 milliGRAM(s) Oral three times a day  multivitamin 1 Tablet(s) Oral daily  sodium bicarbonate 650 milliGRAM(s) Oral three times a day  sodium chloride 0.9%. 1000 milliLiter(s) (60 mL/Hr) IV Continuous <Continuous>    VITALS:  T(F): 97.9 (02-05-20 @ 05:00), Max: 98.1 (02-04-20 @ 12:45)  HR: 73 (02-05-20 @ 05:00)  BP: 149/86 (02-05-20 @ 05:00)  RR: 18 (02-05-20 @ 05:00)  SpO2: 97% (02-05-20 @ 05:00)  Wt(kg): --    02-04 @ 07:01  -  02-05 @ 07:00  --------------------------------------------------------  IN: 1420 mL / OUT: 2350 mL / NET: -930 mL        PHYSICAL EXAM:  Constitutional: NAD  HEENT: anicteric sclera, oropharynx clear, MMM  Neck: No JVD  Respiratory: CTAB, no wheezes, rales or rhonchi  Cardiovascular: S1, S2, RRR  Gastrointestinal: BS+, soft, NT/ND  Extremities: No cyanosis or clubbing. No peripheral edema  Neurological: A/O x 0  : No CVA tenderness. +umrcia.   Skin: No rashes    LABS:  02-04    137  |  104  |  30<H>  ----------------------------<  247<H>  4.3   |  20<L>  |  1.19    Ca    9.9      04 Feb 2020 05:44      Creatinine Trend: 1.19 <--, 1.35 <--, 1.30 <--, 1.15 <--, 1.27 <--, 1.21 <--                        11.3   9.30  )-----------( 199      ( 04 Feb 2020 05:44 )             36.8     Urine Studies:      RADIOLOGY & ADDITIONAL STUDIES:

## 2020-02-05 NOTE — DISCHARGE NOTE PROVIDER - NSDCMRMEDTOKEN_GEN_ALL_CORE_FT
amLODIPine 10 mg oral tablet: 1 tab(s) orally once a day  aspirin 81 mg oral delayed release tablet: 1 tab(s) orally once a day  Drisdol 50,000 intl units (1.25 mg) oral capsule: 1 cap(s) orally once a week  meclizine 25 mg oral tablet: 1 tab(s) orally 2 times a day, As Needed  memantine 10 mg oral tablet: 1 tab(s) orally once a day  metFORMIN 1000 mg oral tablet: 1 tab(s) orally 2 times a day  Metoprolol Tartrate 25 mg oral tablet: 0.5 tab(s) orally 2 times a day acetaminophen 325 mg oral tablet: 2 tab(s) orally every 6 hours, As needed, Temp greater or equal to 38C (100.4F), Mild Pain (1 - 3), Moderate Pain (4 - 6), Severe Pain (7 - 10)  amLODIPine 10 mg oral tablet: 1 tab(s) orally once a day  aspirin 81 mg oral delayed release tablet: 1 tab(s) orally once a day  bisacodyl 5 mg oral delayed release tablet: 1 tab(s) orally once a day, As needed, Constipation  divalproex sodium 125 mg oral delayed release capsule: 1 cap(s) orally 2 times a day  Drisdol 50,000 intl units (1.25 mg) oral capsule: 1 cap(s) orally once a week  insulin lispro 100 units/mL subcutaneous solution: 1 Unit(s) if Glucose 151 - 200  2 Unit(s) if Glucose 201 - 250  3 Unit(s) if Glucose 251 - 300  4 Unit(s) if Glucose 301 - 350  5 Unit(s) if Glucose 351 - 400  6 Unit(s) if Glucose Greater Than 400  meclizine 25 mg oral tablet: 1 tab(s) orally 2 times a day, As Needed  memantine 10 mg oral tablet: 1 tab(s) orally once a day  Metoprolol Tartrate 25 mg oral tablet: 0.5 tab(s) orally 2 times a day  Multiple Vitamins oral tablet: 1 tab(s) orally once a day  senna oral tablet: 2 tab(s) orally once a day (at bedtime)  sodium bicarbonate 650 mg oral tablet: 1 tab(s) orally 3 times a day  tamsulosin 0.4 mg oral capsule: 1 cap(s) orally once a day (at bedtime)

## 2020-02-05 NOTE — DISCHARGE NOTE PROVIDER - NSDCCAREPROVSEEN_GEN_ALL_CORE_FT
Suleman Garrett  [facility] Medicine ACP, Team  [facility] Palliative Care, Team

## 2020-02-05 NOTE — DISCHARGE NOTE PROVIDER - NSDCCPCAREPLAN_GEN_ALL_CORE_FT
PRINCIPAL DISCHARGE DIAGNOSIS  Diagnosis: Altered mental status  Assessment and Plan of Treatment: improved  follow up with your out patient PCP and neurologist as needed      SECONDARY DISCHARGE DIAGNOSES  Diagnosis: MAGALIE (acute kidney injury)  Assessment and Plan of Treatment: renal US negative  follow up [facility] outpatient Urology within 1 week  follow up with outpatient nephrology /Primary care doctor within 1 week     Hamilton placed for urinary retention failed trial of void in hospital    Diagnosis: UTI (urinary tract infection)  Assessment and Plan of Treatment: UA And UCulture negative but treated empirically wi th ceftriaxone x1 dose  follow up with primary care doctor within 1 week    Diagnosis: Essential hypertension  Assessment and Plan of Treatment: Essential hypertension  Low sodium and fat diet, Continue blood pressure medication regimen as directed. Monitor for any visual changes, headaches or dizziness.  Monitor blood pressure regularly.  Follow up with your primary care provider for further management for high blood pressure.    Diagnosis: Weakness  Assessment and Plan of Treatment: Weakness  unable to particpate with physical therapy    Diagnosis: Dementia  Assessment and Plan of Treatment: Dementia  follow up with PCP and neurologist continue medications    Diagnosis: Type 2 diabetes mellitus with other specified complication, without long-term current use of insulin  Assessment and Plan of Treatment: Type 2 diabetes mellitus with other specified complication, without long-term current use of insulin  Continue your medication regimen and a consistent carbohydrate diet (Meaning eating the same amount of carbohydrates at the same time each day). Monitor blood glucose levels throughout the day before meals and at bedtime. Record blood sugars and bring to outpatient providers appointment in order to be reviewed by your doctor for management modifications. If your sugars are more than 400 or less than 70 you should contact your PCP immediately. Monitor for signs/symptoms of low blood glucose, such as, dizziness, altered mental status, or cool/clammy skin. In addition, monitor for signs/symptoms of high blood glucose, such as, feeling hot, dry, fatigued, or with increased thirst/urination. Monitor finger sticks pre-meal and bedtime, low salt, fat and carbohydrate diet, minimize glucose intake.  Exercise daily for at least 30 minutes and weight loss.  Follow up with primary care physician and endocrinologist for routine Hemoglobin A1C checks and management.  Follow up with your ophthalmologist for routine yearly vision exams.Make regular podiatry appointments in order to have feet checked for wounds and uncontrolled toe nail growth to prevent infections, as well as, appointments with an ophthalmologist to monitor your vision. PRINCIPAL DISCHARGE DIAGNOSIS  Diagnosis: Altered mental status  Assessment and Plan of Treatment: You were admitted to the hospital with confusion and a change in behavior. Your CT scan of the head was unremarkable. Upon discharge please continue your prescribed medications and follow-up with your primary care provider as outpatient for ongoing care. If you or your family members notice a state of confusion, slurred speech, change in behavior, or seizure-like activity you should return to the emergency room.      SECONDARY DISCHARGE DIAGNOSES  Diagnosis: UTI (urinary tract infection)  Assessment and Plan of Treatment: Your urine culture did not grow bacteria, however, you were treated empirically with ceftriaxone for 1 dose.    Diagnosis: Acute kidney injury superimposed on CKD  Assessment and Plan of Treatment: In order to prevent further disease progression, continue to follow recommendations made by your primary provider/nephrologist. Continue a diet that is low in sodium and avoid foods that are concentrated in potassium and phosphorus. Continue your medications/supplementations as directed and avoid over-the-counter drugs that are harmful to kidneys, such as, Non-Steroidal Anti-Inflammatory Drugs (NSAIDs). Follow-up as outpatient to monitor your kidney function, as well as, vitamin D, Calcium, potassium, and phosphorus levels.    Diagnosis: Type 2 diabetes mellitus with other specified complication, without long-term current use of insulin  Assessment and Plan of Treatment: ** DO NOT re-start Metformin upon discharge.  Monitor for signs/symptoms of low blood glucose, such as, dizziness, altered mental status, or cool/clammy skin. In addition, monitor for signs/symptoms of high blood glucose, such as, feeling hot, dry, fatigued, or with increased thirst/urination. Make regular podiatry appointments in order to have feet checked for wounds and uncontrolled toe nail growth to prevent infections, as well as, appointments with an ophthalmologist to monitor your vision.    Diagnosis: Dementia  Assessment and Plan of Treatment: Please continue your medications as prescribed and allow help with daily acitivities of living. Maintain a safe environment and make movements in a careful manner to prevent falls. Ensure that you are eating and drinking adequately and maintaining a healthy sleep cycle. If you are in need of assistance with medication adjustment you can follow-up with your outpatient provider or refer to the [facility] Geriatric Psychiatry clinic by calling [phone].    Diagnosis: Essential hypertension  Assessment and Plan of Treatment: Continue blood pressure medication regimen as directed. Monitor for any visual changes, headaches or dizziness.  Monitor blood pressure regularly.  Follow up with your primary care provider for further management for high blood pressure. PRINCIPAL DISCHARGE DIAGNOSIS  Diagnosis: Altered mental status  Assessment and Plan of Treatment: You were admitted to the hospital with confusion and a change in behavior. Your CT scan of the head was unremarkable. You were seen by the neurologist while you were admitted to the hospital.  Please continue depakote as prescribed for behavior.  Upon discharge please continue your prescribed medications and follow-up with your primary care provider as outpatient for ongoing care. If you or your family members notice a state of confusion, slurred speech, change in behavior, or seizure-like activity you should return to the emergency room.      SECONDARY DISCHARGE DIAGNOSES  Diagnosis: Abdominal pain  Assessment and Plan of Treatment: Please follow up with the medical doctors upon arrival to Nursing Home  - Associated wtih malodorous vaginal discharge  - GYN recs BV unlikely (Flagyl discontinued) and CT A/P w/ no acute findings    Diagnosis: Urinary retention  Assessment and Plan of Treatment: Please follow up with the medical doctors upon arrival to Nursing Home.  You failed multiple trial of voids.  Murcia Catheter was placed .  Continue murcia cather as per protocol.      Diagnosis: Acute kidney injury superimposed on CKD  Assessment and Plan of Treatment: Please follow up with the medical doctors upon arrival to Nursing Home.  In order to prevent further disease progression, continue to follow recommendations made by your primary provider/nephrologist. Continue a diet that is low in sodium and avoid foods that are concentrated in potassium and phosphorus. Continue your medications/supplementations as directed and avoid over-the-counter drugs that are harmful to kidneys, such as, Non-Steroidal Anti-Inflammatory Drugs (NSAIDs). Follow-up as outpatient to monitor your kidney function, as well as, vitamin D, Calcium, potassium, and phosphorus levels.    Diagnosis: Type 2 diabetes mellitus with other specified complication, without long-term current use of insulin  Assessment and Plan of Treatment: ** DO NOT re-start Metformin upon discharge.  Please follow up with the medical doctors upon arrival to Nursing Home . Continue your medication regimen and a consistent carbohydrate diet (Meaning eating the same amount of carbohydrates at the same time each day). Monitor blood glucose levels throughout the day before meals and at bedtime. Record blood sugars and bring to outpatient providers appointment in order to be reviewed by your doctor for management modifications. If your sugars are more than 400 or less than 70 you should contact your PCP immediately. Monitor for signs/symptoms of low blood glucose, such as, dizziness, altered mental status, or cool/clammy skin. In addition, monitor for signs/symptoms of high blood glucose, such as, feeling hot, dry, fatigued, or with increased thirst/urination. Make regular podiatry appointments in order to have feet checked for wounds and uncontrolled toe nail growth to prevent infections, as well as, appointments with an ophthalmologist to monitor your vision.       Diagnosis: Dementia  Assessment and Plan of Treatment: Please follow up with the medical doctors upon arrival to Nursing Home. Please continue your medications as prescribed and allow help with daily acitivities of living. Maintain a safe environment and make movements in a careful manner to prevent falls. Ensure that you are eating and drinking adequately and maintaining a healthy sleep cycle. If you are in need of assistance with medication adjustment you can follow-up with your outpatient provider or refer to the [facility] Geriatric Psychiatry clinic by calling [phone].    Diagnosis: Essential hypertension  Assessment and Plan of Treatment: Continue blood pressure medication regimen as directed. Monitor for any visual changes, headaches or dizziness.  Monitor blood pressure regularly.  Follow up with your primary care provider for further management for high blood pressure.    Diagnosis: UTI (urinary tract infection)  Assessment and Plan of Treatment: Your urine culture did not grow bacteria, however, you were treated empirically with ceftriaxone for 1 dose. PRINCIPAL DISCHARGE DIAGNOSIS  Diagnosis: Altered mental status  Assessment and Plan of Treatment: You were admitted to the hospital with confusion and a change in behavior. Your CT scan of the head was unremarkable. You were seen by the neurologist while you were admitted to the hospital.  Please continue depakote as prescribed for behavior.  Upon discharge please continue your prescribed medications and follow-up with your primary care provider as outpatient for ongoing care. If you or your family members notice a state of confusion, slurred speech, change in behavior, or seizure-like activity you should return to the emergency room.      SECONDARY DISCHARGE DIAGNOSES  Diagnosis: Abdominal pain  Assessment and Plan of Treatment: Please follow up with the medical doctors upon arrival to Nursing Home  - Associated wtih malodorous vaginal discharge  - GYN recs BV unlikely (Flagyl discontinued) and CT A/P w/ no acute findings    Diagnosis: Urinary retention  Assessment and Plan of Treatment: Please follow up with the medical doctors upon arrival to Nursing Home.  You failed multiple trial of voids.  Murcia Catheter was placed .  Continue murcia cather as per protocol.      Diagnosis: Acute kidney injury superimposed on CKD  Assessment and Plan of Treatment: Please follow up with the medical doctors upon arrival to Nursing Home.  In order to prevent further disease progression, continue to follow recommendations made by your primary provider/nephrologist. Continue a diet that is low in sodium and avoid foods that are concentrated in potassium and phosphorus. Continue your medications/supplementations as directed and avoid over-the-counter drugs that are harmful to kidneys, such as, Non-Steroidal Anti-Inflammatory Drugs (NSAIDs). Follow-up as outpatient to monitor your kidney function, as well as, vitamin D, Calcium, potassium, and phosphorus levels.    Diagnosis: Type 2 diabetes mellitus with other specified complication, without long-term current use of insulin  Assessment and Plan of Treatment: ** DO NOT re-start Metformin upon discharge.  Please follow up with the medical doctors upon arrival to Nursing Home . Continue your medication regimen and a consistent carbohydrate diet (Meaning eating the same amount of carbohydrates at the same time each day). Monitor blood glucose levels throughout the day before meals and at bedtime. Record blood sugars and bring to outpatient providers appointment in order to be reviewed by your doctor for management modifications. If your sugars are more than 400 or less than 70 you should contact your PCP immediately. Monitor for signs/symptoms of low blood glucose, such as, dizziness, altered mental status, or cool/clammy skin. In addition, monitor for signs/symptoms of high blood glucose, such as, feeling hot, dry, fatigued, or with increased thirst/urination. Make regular podiatry appointments in order to have feet checked for wounds and uncontrolled toe nail growth to prevent infections, as well as, appointments with an ophthalmologist to monitor your vision.       Diagnosis: Dementia  Assessment and Plan of Treatment: Please follow up with the medical doctors upon arrival to Nursing Home. Please continue your medications as prescribed and allow help with daily acitivities of living. Maintain a safe environment and make movements in a careful manner to prevent falls. Ensure that you are eating and drinking adequately and maintaining a healthy sleep cycle. If you are in need of assistance with medication adjustment you can follow-up with your outpatient provider or refer to the [facility] Geriatric Psychiatry clinic by calling [phone].    Diagnosis: Essential hypertension  Assessment and Plan of Treatment: Continue blood pressure medication regimen as directed. Monitor for any visual changes, headaches or dizziness.  Monitor blood pressure regularly.  Follow up with your primary care provider for further management for high blood pressure.    Diagnosis: UTI (urinary tract infection)  Assessment and Plan of Treatment: Your urine culture did not grow bacteria, however, you were treated empirically with ceftriaxone for 1 dose.

## 2020-02-05 NOTE — DISCHARGE NOTE PROVIDER - PROVIDER TOKENS
FREE:[LAST:[[facility] urology],PHONE:[phone]],FAX:[(   )    -],FOLLOWUP:[1 week]],PROVIDER:[TOKEN:[[id number]],FOLLOWUP:[1 week]],PROVIDER:[TOKEN:[[id number]],FOLLOWUP:[1 week]],PROVIDER:[TOKEN:[[id number]],FOLLOWUP:[1 week]]

## 2020-02-05 NOTE — PROGRESS NOTE ADULT - SUBJECTIVE AND OBJECTIVE BOX
Patient is a 89y old  Female who presents with a chief complaint of MAGALIE (29 Jan 2020 14:20)    pt. michael nd examined, failed TOV and murcia reinserted   again TOV as son don't want to take her home with murcia   INTERVAL HPI/OVERNIGHT EVENTS:  T(C): 37.1 (02-05-20 @ 21:34), Max: 37.1 (02-05-20 @ 21:34)  HR: 66 (02-05-20 @ 21:34) (66 - 73)  BP: 148/67 (02-05-20 @ 21:34) (133/66 - 149/86)  RR: 18 (02-05-20 @ 21:34) (18 - 18)  SpO2: 97% (02-05-20 @ 21:34) (96% - 97%)  Wt(kg): --  I&O's Summary    04 Feb 2020 07:01  -  05 Feb 2020 07:00  --------------------------------------------------------  IN: 1420 mL / OUT: 2350 mL / NET: -930 mL    05 Feb 2020 07:01  -  05 Feb 2020 23:23  --------------------------------------------------------  IN: 740 mL / OUT: 300 mL / NET: 440 mL        PAST MEDICAL & SURGICAL HISTORY:  Obesity  Anemia  Dementia  OA (osteoarthritis)  Chronic low back pain  Shingles  DM (diabetes mellitus)  HTN (hypertension)  S/P hysterectomy with oophorectomy  Uterine fibroid      SOCIAL HISTORY  Alcohol:  Tobacco:  Illicit substance use:    FAMILY HISTORY:    REVIEW OF SYSTEMS:  CONSTITUTIONAL: No fever, weight loss, or fatigue  EYES: No eye pain, visual disturbances, or discharge  ENMT:  No difficulty hearing, tinnitus, vertigo; No sinus or throat pain  NECK: No pain or stiffness  RESPIRATORY: No cough, wheezing, chills or hemoptysis; No shortness of breath  CARDIOVASCULAR: No chest pain, palpitations, dizziness, or leg swelling  GASTROINTESTINAL: No abdominal or epigastric pain. No nausea, vomiting, or hematemesis; No diarrhea or constipation. No melena or hematochezia.  GENITOURINARY: No dysuria, frequency, hematuria, or incontinence  NEUROLOGICAL: No headaches, memory loss, loss of strength, numbness, or tremors  SKIN: No itching, burning, rashes, or lesions   LYMPH NODES: No enlarged glands  ENDOCRINE: No heat or cold intolerance; No hair loss  MUSCULOSKELETAL: No joint pain or swelling; No muscle, back, or extremity pain  PSYCHIATRIC: No depression, anxiety, mood swings, or difficulty sleeping  HEME/LYMPH: No easy bruising, or bleeding gums  ALLERY AND IMMUNOLOGIC: No hives or eczema    RADIOLOGY & ADDITIONAL TESTS:    Imaging Personally Reviewed:  [ ] YES  [ ] NO    Consultant(s) Notes Reviewed:  [ ] YES  [ ] NO    PHYSICAL EXAM:  GENERAL: NAD, well-groomed, well-developed  HEAD:  Atraumatic, Normocephalic  EYES: EOMI, PERRLA, conjunctiva and sclera clear  ENMT: No tonsillar erythema, exudates, or enlargement; Moist mucous membranes, Good dentition, No lesions  NECK: Supple, No JVD, Normal thyroid  NERVOUS SYSTEM:  Alert & Oriented X3, Good concentration; Motor Strength 5/5 B/L upper and lower extremities; DTRs 2+ intact and symmetric  CHEST/LUNG: Clear to percussion bilaterally; No rales, rhonchi, wheezing, or rubs  HEART: Regular rate and rhythm; No murmurs, rubs, or gallops  ABDOMEN: Soft, Nontender, Nondistended; Bowel sounds present  EXTREMITIES:  2+ Peripheral Pulses, No clubbing, cyanosis, or edema  LYMPH: No lymphadenopathy noted  SKIN: No rashes or lesions    LABS:                        11.3   9.30  )-----------( 199      ( 04 Feb 2020 05:44 )             36.8     02-04    137  |  104  |  30<H>  ----------------------------<  247<H>  4.3   |  20<L>  |  1.19    Ca    9.9      04 Feb 2020 05:44          CAPILLARY BLOOD GLUCOSE      POCT Blood Glucose.: 213 mg/dL (05 Feb 2020 21:18)  POCT Blood Glucose.: 143 mg/dL (05 Feb 2020 17:06)  POCT Blood Glucose.: 197 mg/dL (05 Feb 2020 12:05)  POCT Blood Glucose.: 133 mg/dL (05 Feb 2020 07:42)            MEDICATIONS  (STANDING):  amLODIPine   Tablet 10 milliGRAM(s) Oral daily  aspirin enteric coated 81 milliGRAM(s) Oral daily  dextrose 5%. 1000 milliLiter(s) (50 mL/Hr) IV Continuous <Continuous>  dextrose 50% Injectable 12.5 Gram(s) IV Push once  dextrose 50% Injectable 25 Gram(s) IV Push once  dextrose 50% Injectable 25 Gram(s) IV Push once  diVALproex Sprinkle 125 milliGRAM(s) Oral two times a day  heparin  Injectable 5000 Unit(s) SubCutaneous every 8 hours  insulin lispro (HumaLOG) corrective regimen sliding scale   SubCutaneous three times a day before meals  insulin lispro (HumaLOG) corrective regimen sliding scale   SubCutaneous at bedtime  memantine 10 milliGRAM(s) Oral daily  metoprolol tartrate 12.5 milliGRAM(s) Oral two times a day  metroNIDAZOLE    Tablet 500 milliGRAM(s) Oral three times a day  multivitamin 1 Tablet(s) Oral daily  polyethylene glycol 3350 17 Gram(s) Oral daily  sodium bicarbonate 650 milliGRAM(s) Oral three times a day  sodium chloride 0.9%. 1000 milliLiter(s) (60 mL/Hr) IV Continuous <Continuous>  tamsulosin 0.4 milliGRAM(s) Oral at bedtime    MEDICATIONS  (PRN):  acetaminophen   Tablet .. 650 milliGRAM(s) Oral every 6 hours PRN Temp greater or equal to 38C (100.4F), Mild Pain (1 - 3), Moderate Pain (4 - 6), Severe Pain (7 - 10)  bisacodyl 5 milliGRAM(s) Oral daily PRN Constipation  bisacodyl Suppository 10 milliGRAM(s) Rectal once PRN Constipation  dextrose 40% Gel 15 Gram(s) Oral once PRN Blood Glucose LESS THAN 70 milliGRAM(s)/deciliter  glucagon  Injectable 1 milliGRAM(s) IntraMuscular once PRN Glucose LESS THAN 70 milligrams/deciliter  meclizine 25 milliGRAM(s) Oral two times a day PRN Dizziness  ondansetron Injectable 4 milliGRAM(s) IV Push every 6 hours PRN Nausea  senna 2 Tablet(s) Oral at bedtime PRN Constipation      Care Discussed with Consultants/Other Providers [ ] YES  [ ] NO

## 2020-02-05 NOTE — PROGRESS NOTE ADULT - PROBLEM SELECTOR PLAN 7
failed TOV and murcia was reinserted   again TOV today and started on flomax   explained son that if she failed TOV , then she would need murcia and f/u with urology as out pt

## 2020-02-05 NOTE — DISCHARGE NOTE PROVIDER - CARE PROVIDER_API CALL
[facility] for urology,   Phone: [phone]  Fax: (   )    -  Follow Up Time: 1 week    Thong Hidalgo)  Internal Medicine; Nephrology  [facility] [address] [place] [address]  Phone: [phone]  Fax: [phone]  Follow Up Time: 1 week    Suleman Garrett)  Medicine  [facility] [place] [address]  Phone: [phone]  Fax: [phone]  Follow Up Time: 1 week    Stormy Chavira)  Neurology; Vascular Neurology  [address] [place] [address]  Phone: [phone]  Fax: [phone]  Follow Up Time: 1 week

## 2020-02-05 NOTE — CHART NOTE - NSCHARTNOTEFT_GEN_A_CORE
flomax started as per attending    as per son he would not want patient to return home with murcia, given that Cr has normalized and renal us was negative for obstruction will attempt another TOV at this time . Murcia removed by RN this afternoon.  If fails trial of void plan is for discharge home with homecare and murcia and urology f/u outpatient     as per Social work son will be at hospital tomorrow mid morning for discharge planning     d/w Attending and RN flomax started as per attending, also spoke with urology would be reasonable to start, as well as started bowel regimen (miralax) PRN ducolax ordered for this evening if no BM after miralax. as per RN no bowel movement yesterday or today. no BM documented.     as per son he would not want patient to return home with murcia, given that Cr has normalized and renal us was negative for obstruction will attempt another TOV at this time . Murcia removed by RN this afternoon.  If fails trial of void plan is for discharge home with homecare and murcia and urology f/u outpatient     as per Social work son will be at hospital tomorrow mid morning for discharge planning     - monitor BM, monitor TOV     d/w Attending and RN

## 2020-02-05 NOTE — DISCHARGE NOTE PROVIDER - CARE PROVIDERS DIRECT ADDRESSES
,DirectAddress_Unknown,DirectAddress_Unknown,[email or web address],DirectAddress_Unknown

## 2020-02-05 NOTE — DISCHARGE NOTE PROVIDER - HOSPITAL COURSE
89 Creole speaking female, h/o dementia, DM2, HTN,  presents from aide for evaluation of AMS. etiology is not clear.         +AMS  -Improved - A&Ox2-3- RVP negative , UA /urine culture negative to date, Started empiric ceftriaxone on 1/23  agitation likely sundowning due to dementia will regulate sleep wake cycle, r/o infectious process - Neuro - Dr. chavira      + Urinary retention/ MAGALIE  - Straight cath protocol initiated cont. murcai - continue normal saline inc to 75cc/hr on 1/23 by renal - Renal u/S neg for hydro 1/23     + DM - controlled- A1C = 6.4 - was on home metformin but will need regimen on discharge     code stroke called on 1/23 due to Left facial droop - resolved - uknown last normal - CT negative and EEG with generalized slowing , possible due to haldol ( total of 2mg given overnight on 1/22)         Altered mental status.      Etiology is not clear, no signs of infection. ? deterioration of dementia.    rvp neg, cxr neg, ekg, CT head.neg    -Consider Haldol PRN for agitation.     Agitation likely sundowning due to dementia    will regulate sleep wake cycle    r/o infectious process      1/23 EEG- generalized slowing         MAGALIE (acute kidney injury).      Creat 2, July 2019 1.2.    Monitor Renal function    Renal consult- Dr. MONTAGUE- MAGALIE on CKD 3 b/l Cr 1.1-1.3 07/2019    MAGALIE pre-renal azotemia vs post renal, clinically euvolemicr/o UTI- agree w/CTX iv - continue     Renal us negative for hydro     d/c home metformin         Dementia.- -contact famly re: collaterals, baseline MS    -Cont home meds.         Type 2 diabetes mellitus with other specified complication, without long-term current use of insulin.      A1C= 6.4 - was on metformin at home will need other agent on discharge    -FSSS with Humalog coverge.         Essential hypertension.  Plan: BP elevated.    - Cont. home meds.          Prophylactic measure.     -SQ heparin.        1/23/20- Neuro Dr Chavira limited exam as pt is not cooperative.    Problem/Recommendations 1 Agitation likely sundowning due to dementia    will regulate sleep wake cycle    r/o infectious process          Problem/Recommendations 2: lethargy     stroke team was called who eval pt and discontinued the call as son at bedside reported that pts is at baseline     avoid any sedation     ct head unremarkable     eeg did not reveal any sz                 *SW INVOLVED FOR REACHING FAMILY CANNOT REACH FAMILY AS OF 2-5 89 Creole speaking F h/o dementia, DM2, HTN presenting with altered mental status         Presenting with AMS and agitation likely 2/2 progressing dementia; Neurology consulted as etiology was unclear; No signs of infection - RVP/Urine culture negative; CXR clear; CT head negative; EEG generalized slowing; Depakote BID for behavioral control ; C/w Namenda; Patient treated empirically with 1 dose CTX for presumed urinary tract infection        Abdominal pain associated with malodorous vaginal discharge for which GYN consulted; BV unlikely (Flagyl discontinued) and CT A/P w/ no acute findings         Urinary retention for which Flomax started; Bowel regimen started to prevent constipation; Son did NOT want patient to have a Hamilton upon discharge, so TOV attempted; Failed TOV Hamilton reinserted 1/30; Failed TOV second time reinserted 2/6         MAGALIE on CKD 3 with baseline Cr 1.1-1.3 (July 2019) for which renal consulted; US negative for hydronephrosis; DC home metformin        Type 2 diabetes mellitus A1C 6.4% was on metformin at home will need other agent on discharge; FSSS with Humalog coverge.         Essential hypertension continued on Norvasc and Lopressor; BP adequate        Dispo - 89 Creole speaking F h/o dementia, DM2, HTN presenting with altered mental status         Presenting with AMS and agitation likely 2/2 progressing dementia; Neurology consulted as etiology was unclear; No signs of infection - RVP/Urine culture negative; CXR clear; CT head negative; EEG generalized slowing; Depakote BID for behavioral control ; C/w Namenda; Patient treated empirically with 1 dose CTX for presumed urinary tract infection        Abdominal pain associated with malodorous vaginal discharge for which GYN consulted; BV unlikely (Flagyl discontinued) and CT A/P w/ no acute findings         Urinary retention for which Flomax started; Bowel regimen started to prevent constipation; Son did NOT want patient to have a Hamilton upon discharge, so TOV attempted; Failed TOV Hamilton reinserted 1/30; Failed TOV second time reinserted 2/6 ;        MAGALIE on CKD 3 with baseline Cr 1.1-1.3 (July 2019) for which renal consulted; US negative for hydronephrosis; DC home metformin        Type 2 diabetes mellitus A1C 6.4% was on metformin at home will need other agent on discharge; FSSS with Humalog coverge.         Essential hypertension continued on Norvasc and Lopressor; BP adequate        Dispo - Nursing home         On_________, discussed with __________, patient is medically cleared and optimized for discharge today. All medications were reviewed with attending, and sent to mutually agreed upon pharmacy. 89 Creole speaking F h/o dementia, DM2, HTN presenting with altered mental status         Presenting with AMS and agitation likely 2/2 progressing dementia; Neurology consulted as etiology was unclear; No signs of infection - RVP/Urine culture negative; CXR clear; CT head negative; EEG generalized slowing; Depakote BID for behavioral control ; C/w Namenda; Patient treated empirically with 1 dose CTX for presumed urinary tract infection        Abdominal pain associated with malodorous vaginal discharge for which GYN consulted; BV unlikely (Flagyl discontinued) and CT A/P w/ no acute findings         Urinary retention for which Flomax started; Bowel regimen started to prevent constipation; Son did NOT want patient to have a Hamilton upon discharge, so TOV attempted; Failed TOV Hamilton reinserted 1/30; Failed TOV second time reinserted 2/6 ;        MAGALIE on CKD 3 with baseline Cr 1.1-1.3 (July 2019) for which renal consulted; US negative for hydronephrosis; DC home metformin        Type 2 diabetes mellitus A1C 6.4% was on metformin at home will need other agent on discharge; FSSS with Humalog coverge.         Essential hypertension continued on Norvasc and Lopressor; BP adequate        Dispo - Nursing home

## 2020-02-06 PROCEDURE — 74176 CT ABD & PELVIS W/O CONTRAST: CPT | Mod: 26

## 2020-02-06 PROCEDURE — 76770 US EXAM ABDO BACK WALL COMP: CPT | Mod: 26

## 2020-02-06 PROCEDURE — 74019 RADEX ABDOMEN 2 VIEWS: CPT | Mod: 26

## 2020-02-06 RX ORDER — LACTULOSE 10 G/15ML
10 SOLUTION ORAL THREE TIMES A DAY
Refills: 0 | Status: DISCONTINUED | OUTPATIENT
Start: 2020-02-06 | End: 2020-02-06

## 2020-02-06 RX ORDER — SENNA PLUS 8.6 MG/1
2 TABLET ORAL AT BEDTIME
Refills: 0 | Status: DISCONTINUED | OUTPATIENT
Start: 2020-02-06 | End: 2020-02-13

## 2020-02-06 RX ADMIN — Medication 1: at 21:41

## 2020-02-06 RX ADMIN — Medication 650 MILLIGRAM(S): at 05:32

## 2020-02-06 RX ADMIN — Medication 650 MILLIGRAM(S): at 15:14

## 2020-02-06 RX ADMIN — HEPARIN SODIUM 5000 UNIT(S): 5000 INJECTION INTRAVENOUS; SUBCUTANEOUS at 15:13

## 2020-02-06 RX ADMIN — Medication 500 MILLIGRAM(S): at 21:41

## 2020-02-06 RX ADMIN — LACTULOSE 10 GRAM(S): 10 SOLUTION ORAL at 15:13

## 2020-02-06 RX ADMIN — Medication 1 TABLET(S): at 12:04

## 2020-02-06 RX ADMIN — POLYETHYLENE GLYCOL 3350 17 GRAM(S): 17 POWDER, FOR SOLUTION ORAL at 12:03

## 2020-02-06 RX ADMIN — Medication 650 MILLIGRAM(S): at 21:42

## 2020-02-06 RX ADMIN — DIVALPROEX SODIUM 125 MILLIGRAM(S): 500 TABLET, DELAYED RELEASE ORAL at 17:57

## 2020-02-06 RX ADMIN — Medication 12.5 MILLIGRAM(S): at 17:57

## 2020-02-06 RX ADMIN — Medication 500 MILLIGRAM(S): at 05:32

## 2020-02-06 RX ADMIN — AMLODIPINE BESYLATE 10 MILLIGRAM(S): 2.5 TABLET ORAL at 05:33

## 2020-02-06 RX ADMIN — HEPARIN SODIUM 5000 UNIT(S): 5000 INJECTION INTRAVENOUS; SUBCUTANEOUS at 05:32

## 2020-02-06 RX ADMIN — Medication 81 MILLIGRAM(S): at 12:04

## 2020-02-06 RX ADMIN — Medication 12.5 MILLIGRAM(S): at 05:32

## 2020-02-06 RX ADMIN — Medication 500 MILLIGRAM(S): at 15:14

## 2020-02-06 RX ADMIN — SENNA PLUS 2 TABLET(S): 8.6 TABLET ORAL at 21:41

## 2020-02-06 RX ADMIN — DIVALPROEX SODIUM 125 MILLIGRAM(S): 500 TABLET, DELAYED RELEASE ORAL at 05:32

## 2020-02-06 RX ADMIN — Medication 5 MILLIGRAM(S): at 08:01

## 2020-02-06 RX ADMIN — HEPARIN SODIUM 5000 UNIT(S): 5000 INJECTION INTRAVENOUS; SUBCUTANEOUS at 21:42

## 2020-02-06 RX ADMIN — Medication 1 ENEMA: at 15:14

## 2020-02-06 RX ADMIN — Medication 1: at 08:01

## 2020-02-06 RX ADMIN — Medication 1: at 12:03

## 2020-02-06 RX ADMIN — TAMSULOSIN HYDROCHLORIDE 0.4 MILLIGRAM(S): 0.4 CAPSULE ORAL at 21:42

## 2020-02-06 RX ADMIN — MEMANTINE HYDROCHLORIDE 10 MILLIGRAM(S): 10 TABLET ORAL at 12:05

## 2020-02-06 NOTE — CONSULT NOTE ADULT - SUBJECTIVE AND OBJECTIVE BOX
R[facility] GYN Consult Note    #[id number]    Patient is 89y Creole postmenopausal female a/w AMS. GYN consult for vaginal discharge. Patient is unable to answer questions regarding symptoms at this time as she states that she is in half-way for a murder she did not commit. Per her nurse (An PORTER), she n  HPI:  89 Creole speaking female, h/o dementia, DM2, HTN,  presents from aide for evaluation of AMS. Patient is a poor historian, AAOx2 at baseline and hard of hearing. Unable to obtain history. Patient denies symptoms. She reports that she was on a train [place] and is unable to recall how she ended up at the hospital. Unable to reach family members for collateral information.   Currently, only has mild back pain, denies any CP or SOB.     PCP- Dr Buster Paulson (2020 13:38)      OB/GYN HISTORY:   Clayton:  Parity:  Term:  :  MAB/TAB/Ectopic:  Living:    Last Menstrual Period    Name of GYN Physician:  Date of Last Pap:  History of Abnormal Pap:  Date of Last Mammogram:  Date of Last Colonoscopy:  Current OCP use:     PAST MEDICAL & SURGICAL HISTORY:  Obesity  Anemia  Dementia  OA (osteoarthritis)  Chronic low back pain  Shingles  DM (diabetes mellitus)  HTN (hypertension)  S/P hysterectomy with oophorectomy  Uterine fibroid      REVIEW OF SYSTEMS      General:	    Skin/Breast:  	  Ophthalmologic:  	  ENMT:	    Respiratory and Thorax:  	  Cardiovascular:	    Gastrointestinal:	    Genitourinary:	    Musculoskeletal:	    Neurological:	    Psychiatric:	    Hematology/Lymphatics:	    Endocrine:	    Allergic/Immunologic:	    MEDICATIONS  (STANDING):  amLODIPine   Tablet 10 milliGRAM(s) Oral daily  aspirin enteric coated 81 milliGRAM(s) Oral daily  dextrose 5%. 1000 milliLiter(s) (50 mL/Hr) IV Continuous <Continuous>  dextrose 50% Injectable 12.5 Gram(s) IV Push once  dextrose 50% Injectable 25 Gram(s) IV Push once  dextrose 50% Injectable 25 Gram(s) IV Push once  diVALproex Sprinkle 125 milliGRAM(s) Oral two times a day  heparin  Injectable 5000 Unit(s) SubCutaneous every 8 hours  insulin lispro (HumaLOG) corrective regimen sliding scale   SubCutaneous three times a day before meals  insulin lispro (HumaLOG) corrective regimen sliding scale   SubCutaneous at bedtime  memantine 10 milliGRAM(s) Oral daily  metoprolol tartrate 12.5 milliGRAM(s) Oral two times a day  metroNIDAZOLE    Tablet 500 milliGRAM(s) Oral three times a day  multivitamin 1 Tablet(s) Oral daily  polyethylene glycol 3350 17 Gram(s) Oral daily  senna 2 Tablet(s) Oral at bedtime  sodium bicarbonate 650 milliGRAM(s) Oral three times a day  sodium chloride 0.9%. 1000 milliLiter(s) (60 mL/Hr) IV Continuous <Continuous>  tamsulosin 0.4 milliGRAM(s) Oral at bedtime    MEDICATIONS  (PRN):  acetaminophen   Tablet .. 650 milliGRAM(s) Oral every 6 hours PRN Temp greater or equal to 38C (100.4F), Mild Pain (1 - 3), Moderate Pain (4 - 6), Severe Pain (7 - 10)  bisacodyl 5 milliGRAM(s) Oral daily PRN Constipation  bisacodyl Suppository 10 milliGRAM(s) Rectal once PRN Constipation  dextrose 40% Gel 15 Gram(s) Oral once PRN Blood Glucose LESS THAN 70 milliGRAM(s)/deciliter  glucagon  Injectable 1 milliGRAM(s) IntraMuscular once PRN Glucose LESS THAN 70 milligrams/deciliter  meclizine 25 milliGRAM(s) Oral two times a day PRN Dizziness  ondansetron Injectable 4 milliGRAM(s) IV Push every 6 hours PRN Nausea      Allergies    No Known Allergies    Intolerances        SOCIAL HISTORY:    FAMILY HISTORY:  No pertinent family history in first degree relatives      Vital Signs Last 24 Hrs  T(C): 36.7 (2020 12:33), Max: 37.1 (2020 21:34)  T(F): 98 (2020 12:33), Max: 98.7 (2020 21:34)  HR: 96 (2020 17:56) (66 - 96)  BP: 132/71 (2020 17:56) (108/62 - 150/79)  BP(mean): --  RR: 17 (2020 12:33) (17 - 18)  SpO2: 99% (2020 12:33) (97% - 99%)    PHYSICAL EXAM:      Constitutional: alert and oriented x 3    Breasts: no tenderness, no nodules    Respiratory: clear    Cardiovascular: regular rate and rhythm    Gastrointestinal: soft, non tender, + bowel sounds. No hepatosplenomegaly, no palpable masses    Genitourinary: NEFG  Cervix: closed/ long, no CMT  Uterus: normal size, non tender  Adnexa: non tender, no palpable masses    Rectal:     Extremities:    Neurological:    Skin:    Lymph Nodes:        LABS:                RADIOLOGY & ADDITIONAL STUDIES: R3 GYN Consult Note    #[id number]    Patient is 89y Creole postmenopausal female a/w AMS. GYN consult for vaginal discharge. Patient is unable to answer questions regarding symptoms at this time as she states that she is in intermediate for a murder she did not commit. Per her nurse (An PORTER), the patient has not had any vaginal discharge or bleeding on her underlying pad. She reports foul smelling urine when she undergoes a straight catheterization every 8 hours. Additionally, patient has experienced constipation and received an enema earlier today. Her nurse states that she has not noticed any vaginal scratching or the patient motioning towards her vagina/abdomen regarding pain.     HPI:  89 Creole speaking female, h/o dementia, DM2, HTN,  presents from [facility] for evaluation of AMS. Patient is a poor historian, AAOx2 at baseline and hard of hearing. Unable to obtain history. Patient denies symptoms. She reports that she was on a train [place] and is unable to recall how she ended up at the hospital. Unable to reach family members for collateral information.   Currently, only has mild back pain, denies any CP or SOB.     PCP- Dr Buster Paulson (22 Jan 2020 13:38)      OB/GYN HISTORY: Unable to obtain from patient. Verbally from medicine team, patient had a hysterectomy but mode of hysterectomy and reason is unattainable at this time    Last Menstrual Period unknown    Name of GYN Physician: none     PAST MEDICAL & SURGICAL HISTORY:  Obesity  Anemia  Dementia  OA (osteoarthritis)  Chronic low back pain  Shingles  DM (diabetes mellitus)  HTN (hypertension)  S/P hysterectomy with oophorectomy  Uterine fibroid    REVIEW OF SYSTEMS: Negative except listed in HPI    MEDICATIONS  (STANDING):  amLODIPine   Tablet 10 milliGRAM(s) Oral daily  aspirin enteric coated 81 milliGRAM(s) Oral daily  dextrose 5%. 1000 milliLiter(s) (50 mL/Hr) IV Continuous <Continuous>  dextrose 50% Injectable 12.5 Gram(s) IV Push once  dextrose 50% Injectable 25 Gram(s) IV Push once  dextrose 50% Injectable 25 Gram(s) IV Push once  diVALproex Sprinkle 125 milliGRAM(s) Oral two times a day  heparin  Injectable 5000 Unit(s) SubCutaneous every 8 hours  insulin lispro (HumaLOG) corrective regimen sliding scale   SubCutaneous three times a day before meals  insulin lispro (HumaLOG) corrective regimen sliding scale   SubCutaneous at bedtime  memantine 10 milliGRAM(s) Oral daily  metoprolol tartrate 12.5 milliGRAM(s) Oral two times a day  metroNIDAZOLE    Tablet 500 milliGRAM(s) Oral three times a day  multivitamin 1 Tablet(s) Oral daily  polyethylene glycol 3350 17 Gram(s) Oral daily  senna 2 Tablet(s) Oral at bedtime  sodium bicarbonate 650 milliGRAM(s) Oral three times a day  sodium chloride 0.9%. 1000 milliLiter(s) (60 mL/Hr) IV Continuous <Continuous>  tamsulosin 0.4 milliGRAM(s) Oral at bedtime    MEDICATIONS  (PRN):  acetaminophen   Tablet .. 650 milliGRAM(s) Oral every 6 hours PRN Temp greater or equal to 38C (100.4F), Mild Pain (1 - 3), Moderate Pain (4 - 6), Severe Pain (7 - 10)  bisacodyl 5 milliGRAM(s) Oral daily PRN Constipation  bisacodyl Suppository 10 milliGRAM(s) Rectal once PRN Constipation  dextrose 40% Gel 15 Gram(s) Oral once PRN Blood Glucose LESS THAN 70 milliGRAM(s)/deciliter  glucagon  Injectable 1 milliGRAM(s) IntraMuscular once PRN Glucose LESS THAN 70 milligrams/deciliter  meclizine 25 milliGRAM(s) Oral two times a day PRN Dizziness  ondansetron Injectable 4 milliGRAM(s) IV Push every 6 hours PRN Nausea      Allergies  No Known Allergies    SOCIAL HISTORY: unable to obtain    FAMILY HISTORY: unable to obtain    Vital Signs Last 24 Hrs  T(C): 36.7 (06 Feb 2020 12:33), Max: 37.1 (05 Feb 2020 21:34)  T(F): 98 (06 Feb 2020 12:33), Max: 98.7 (05 Feb 2020 21:34)  HR: 96 (06 Feb 2020 17:56) (66 - 96)  BP: 132/71 (06 Feb 2020 17:56) (108/62 - 150/79)  BP(mean): --  RR: 17 (06 Feb 2020 12:33) (17 - 18)  SpO2: 99% (06 Feb 2020 12:33) (97% - 99%)    PHYSICAL EXAM:  Constitutional: alert, not orientated to place, person or time  Respiratory: CTA-B, symmetrical expansion  Cardiovascular: RRR  Gastrointestinal: soft, non tender, + bowel sounds. No hepatosplenomegaly, no palpable masses  Genitourinary: NEFG, thin clear physiologic discharge, pink rugae of vagina, intact vaginal cuff, normal pelvic exam, no evidence of infection

## 2020-02-06 NOTE — CHART NOTE - NSCHARTNOTEFT_GEN_A_CORE
patient failed trial of void , murcia to be reinserted by RN .   Suspect urinary retention is secondary to constipation. Last BM 2/2 as per support staff report. RN notified to monitor and document next bowel movement.    Added lactulose titrate to one BM per day as per Attending Dr Gerry el qhs       d/w Attending and RN patient failed trial of void , murcia to be reinserted by RN .   Suspect urinary retention is secondary to constipation. Last BM 2/2 as per support staff report. RN notified to monitor and document next bowel movement.    Added lactulose titrate to one BM per day as per Attending Dr Gerry Agrawal PRN   Senna standing qhs   Miralax daily       d/w Attending and RN Suspect urinary retention is secondary to constipation. Last BM 2/2 as per support staff report. RN notified to monitor and document next bowel movement.    bladder scan q8h straight cath q8h until bowel movement   Added lactulose titrate to one BM per day as per Attending Dr Gerry Agrawal PRN   Senna standing qhs   Miralax daily       d/w Attending and RN

## 2020-02-06 NOTE — CHART NOTE - NSCHARTNOTEFT_GEN_A_CORE
nadja, Jim @ [phone] updated on plan of care and patient is being treated for urinary retention , awaiting bowel movement    d/w Attending and RN

## 2020-02-06 NOTE — CONSULT NOTE ADULT - ASSESSMENT
89 Creole speaking female, h/o dementia, DM2, HTN,  presented from aide for evaluation of AMS. Renal consulted for MAGALIE.     MAGALIE on CKD 3 b/l Cr 1.1-1.3 07/2019  MAGALIE pre-renal azotemia vs post renal  clinically euvolemic    HTN-BP high initially, now better  r/o UTI- agree w/CTX iv. f/u UCx  DM- Mx per primary team    labs, rad, chart reviewed  check bladder scan for PVR/bladder volume  start IVF-NS @60ml/hr  if RFT worsens or if PVR high, obtain renal sono  low Na in diet  d/c home metformin  resume home bp meds  monitor BMP daily and u/o qshift  dose all meds for eGFR<15ml/min.   avoid ACEi/ARB/NSAIDs/Nephrotoxics.  Thanks for consulting. will closely f/u.
89 Creole speaking female, h/o dementia, DM2, HTN,  presents from aide for evaluation of AMS. Palliative Care consulted for complex decision making in the setting of dementia and advanced illness.
89 yo postmenopausal female a/w AMS. GYN consulted for vaginal discharge which was not present on physical exam. Physical exam showed normal postmenopausal vaginal changes and no evidence of infection

## 2020-02-06 NOTE — CONSULT NOTE ADULT - PROBLEM SELECTOR RECOMMENDATION 9
LIkely multifactorial PPSV 40% high risk of further decline  Needs assistance with most ADLS  Skin care, frequent repositioning
- no evidence of vulvovaginitis at this time. Do no recommend treatement for bacterial vaginosis and physical exam showed thin clear physiologic discharge. Malodorous smell likely coming from urine    TBS by Dr. Chago Cortes [id number]

## 2020-02-06 NOTE — PROGRESS NOTE ADULT - SUBJECTIVE AND OBJECTIVE BOX
Patient is a 89y old  Female who presents with a chief complaint of MAGALIE (29 Jan 2020 14:20)    pt. baseline dementia , failed TOV    INTERVAL HPI/OVERNIGHT EVENTS:  T(C): 36.7 (02-06-20 @ 12:33), Max: 37.1 (02-05-20 @ 21:34)  HR: 96 (02-06-20 @ 17:56) (66 - 96)  BP: 132/71 (02-06-20 @ 17:56) (108/62 - 150/79)  RR: 17 (02-06-20 @ 12:33) (17 - 18)  SpO2: 99% (02-06-20 @ 12:33) (97% - 99%)  Wt(kg): --  I&O's Summary    05 Feb 2020 07:01  -  06 Feb 2020 07:00  --------------------------------------------------------  IN: 740 mL / OUT: 500 mL / NET: 240 mL    06 Feb 2020 07:01  -  06 Feb 2020 20:06  --------------------------------------------------------  IN: 650 mL / OUT: 300 mL / NET: 350 mL        PAST MEDICAL & SURGICAL HISTORY:  Obesity  Anemia  Dementia  OA (osteoarthritis)  Chronic low back pain  Shingles  DM (diabetes mellitus)  HTN (hypertension)  S/P hysterectomy with oophorectomy  Uterine fibroid      SOCIAL HISTORY  Alcohol:  Tobacco:  Illicit substance use:    FAMILY HISTORY:    REVIEW OF SYSTEMS:  CONSTITUTIONAL: No fever, weight loss, or fatigue  EYES: No eye pain, visual disturbances, or discharge  ENMT:  No difficulty hearing, tinnitus, vertigo; No sinus or throat pain  NECK: No pain or stiffness  RESPIRATORY: No cough, wheezing, chills or hemoptysis; No shortness of breath  CARDIOVASCULAR: No chest pain, palpitations, dizziness, or leg swelling  GASTROINTESTINAL: No abdominal or epigastric pain. No nausea, vomiting, or hematemesis; No diarrhea or constipation. No melena or hematochezia.  GENITOURINARY: No dysuria, frequency, hematuria, or incontinence  NEUROLOGICAL: No headaches, memory loss, loss of strength, numbness, or tremors  SKIN: No itching, burning, rashes, or lesions   LYMPH NODES: No enlarged glands  ENDOCRINE: No heat or cold intolerance; No hair loss  MUSCULOSKELETAL: No joint pain or swelling; No muscle, back, or extremity pain  PSYCHIATRIC: No depression, anxiety, mood swings, or difficulty sleeping  HEME/LYMPH: No easy bruising, or bleeding gums  ALLERY AND IMMUNOLOGIC: No hives or eczema    RADIOLOGY & ADDITIONAL TESTS:    Imaging Personally Reviewed:  [ ] YES  [ ] NO    Consultant(s) Notes Reviewed:  [ ] YES  [ ] NO    PHYSICAL EXAM:  GENERAL: NAD, well-groomed, well-developed  HEAD:  Atraumatic, Normocephalic  EYES: EOMI, PERRLA, conjunctiva and sclera clear  ENMT: No tonsillar erythema, exudates, or enlargement; Moist mucous membranes, Good dentition, No lesions  NECK: Supple, No JVD, Normal thyroid  NERVOUS SYSTEM:  Alert & Oriented X3, Good concentration; Motor Strength 5/5 B/L upper and lower extremities; DTRs 2+ intact and symmetric  CHEST/LUNG: Clear to percussion bilaterally; No rales, rhonchi, wheezing, or rubs  HEART: Regular rate and rhythm; No murmurs, rubs, or gallops  ABDOMEN: Soft, Nontender, Nondistended; Bowel sounds present  EXTREMITIES:  2+ Peripheral Pulses, No clubbing, cyanosis, or edema  LYMPH: No lymphadenopathy noted  SKIN: No rashes or lesions    LABS:              CAPILLARY BLOOD GLUCOSE      POCT Blood Glucose.: 150 mg/dL (06 Feb 2020 17:05)  POCT Blood Glucose.: 188 mg/dL (06 Feb 2020 11:59)  POCT Blood Glucose.: 173 mg/dL (06 Feb 2020 07:28)  POCT Blood Glucose.: 213 mg/dL (05 Feb 2020 21:18)            MEDICATIONS  (STANDING):  amLODIPine   Tablet 10 milliGRAM(s) Oral daily  aspirin enteric coated 81 milliGRAM(s) Oral daily  dextrose 5%. 1000 milliLiter(s) (50 mL/Hr) IV Continuous <Continuous>  dextrose 50% Injectable 12.5 Gram(s) IV Push once  dextrose 50% Injectable 25 Gram(s) IV Push once  dextrose 50% Injectable 25 Gram(s) IV Push once  diVALproex Sprinkle 125 milliGRAM(s) Oral two times a day  heparin  Injectable 5000 Unit(s) SubCutaneous every 8 hours  insulin lispro (HumaLOG) corrective regimen sliding scale   SubCutaneous three times a day before meals  insulin lispro (HumaLOG) corrective regimen sliding scale   SubCutaneous at bedtime  memantine 10 milliGRAM(s) Oral daily  metoprolol tartrate 12.5 milliGRAM(s) Oral two times a day  metroNIDAZOLE    Tablet 500 milliGRAM(s) Oral three times a day  multivitamin 1 Tablet(s) Oral daily  polyethylene glycol 3350 17 Gram(s) Oral daily  senna 2 Tablet(s) Oral at bedtime  sodium bicarbonate 650 milliGRAM(s) Oral three times a day  sodium chloride 0.9%. 1000 milliLiter(s) (60 mL/Hr) IV Continuous <Continuous>  tamsulosin 0.4 milliGRAM(s) Oral at bedtime    MEDICATIONS  (PRN):  acetaminophen   Tablet .. 650 milliGRAM(s) Oral every 6 hours PRN Temp greater or equal to 38C (100.4F), Mild Pain (1 - 3), Moderate Pain (4 - 6), Severe Pain (7 - 10)  bisacodyl 5 milliGRAM(s) Oral daily PRN Constipation  bisacodyl Suppository 10 milliGRAM(s) Rectal once PRN Constipation  dextrose 40% Gel 15 Gram(s) Oral once PRN Blood Glucose LESS THAN 70 milliGRAM(s)/deciliter  glucagon  Injectable 1 milliGRAM(s) IntraMuscular once PRN Glucose LESS THAN 70 milligrams/deciliter  meclizine 25 milliGRAM(s) Oral two times a day PRN Dizziness  ondansetron Injectable 4 milliGRAM(s) IV Push every 6 hours PRN Nausea      Care Discussed with Consultants/Other Providers [ ] YES  [ ] NO

## 2020-02-06 NOTE — CHART NOTE - NSCHARTNOTEFT_GEN_A_CORE
patient remains with urinary retention and requiring straight cath q8h , enema/bowel reigmen medications ordered, Abdominal xray shows stool in large bowel & surgical clips from hysterectomy as per prelim read. Patient noted with malorderous vaginal discharge from 2/3 patient treated emprically for bacterial vaginosis with flagyl, since intiation discharge amount has decreased as per RN. Given that patient is c/o "belt pain" , malodorous discharge , and ? history of hysterectomy based on radiology results of surgical clips (d/w radiology resident) GYN consult called  for exam    - f/u gyn rec's    attempted to reach nadja Fernandez @ [phone] no answer     d/w Attending and RN

## 2020-02-06 NOTE — CONSULT NOTE ADULT - ATTENDING COMMENTS
pl call for any q's  cell [phone]
Pt seen and examined  Agree with above    Pt with no pathologic discharge  Pt with no vaginal complaints    Physiologic discharge seen on pelvic examination    Rec:  1) No acute Gyn intervention necessary at this time.    SURY Anders

## 2020-02-07 DIAGNOSIS — Z78.0 ASYMPTOMATIC MENOPAUSAL STATE: ICD-10-CM

## 2020-02-07 LAB
ANION GAP SERPL CALC-SCNC: 12 MMO/L — SIGNIFICANT CHANGE UP (ref 7–14)
BUN SERPL-MCNC: 31 MG/DL — HIGH (ref 7–23)
CALCIUM SERPL-MCNC: 9.5 MG/DL — SIGNIFICANT CHANGE UP (ref 8.4–10.5)
CHLORIDE SERPL-SCNC: 105 MMOL/L — SIGNIFICANT CHANGE UP (ref 98–107)
CO2 SERPL-SCNC: 18 MMOL/L — LOW (ref 22–31)
CREAT SERPL-MCNC: 1.14 MG/DL — SIGNIFICANT CHANGE UP (ref 0.5–1.3)
GLUCOSE SERPL-MCNC: 151 MG/DL — HIGH (ref 70–99)
HCT VFR BLD CALC: 32.4 % — LOW (ref 34.5–45)
HGB BLD-MCNC: 10.1 G/DL — LOW (ref 11.5–15.5)
MCHC RBC-ENTMCNC: 31.2 % — LOW (ref 32–36)
MCHC RBC-ENTMCNC: 32.8 PG — SIGNIFICANT CHANGE UP (ref 27–34)
MCV RBC AUTO: 105.2 FL — HIGH (ref 80–100)
NRBC # FLD: 0 K/UL — SIGNIFICANT CHANGE UP (ref 0–0)
PLATELET # BLD AUTO: 134 K/UL — LOW (ref 150–400)
PMV BLD: 11.5 FL — SIGNIFICANT CHANGE UP (ref 7–13)
POTASSIUM SERPL-MCNC: 5 MMOL/L — SIGNIFICANT CHANGE UP (ref 3.5–5.3)
POTASSIUM SERPL-SCNC: 5 MMOL/L — SIGNIFICANT CHANGE UP (ref 3.5–5.3)
RBC # BLD: 3.08 M/UL — LOW (ref 3.8–5.2)
RBC # FLD: 13.3 % — SIGNIFICANT CHANGE UP (ref 10.3–14.5)
SODIUM SERPL-SCNC: 135 MMOL/L — SIGNIFICANT CHANGE UP (ref 135–145)
WBC # BLD: 8.98 K/UL — SIGNIFICANT CHANGE UP (ref 3.8–10.5)
WBC # FLD AUTO: 8.98 K/UL — SIGNIFICANT CHANGE UP (ref 3.8–10.5)

## 2020-02-07 PROCEDURE — 99221 1ST HOSP IP/OBS SF/LOW 40: CPT

## 2020-02-07 RX ORDER — CHLORHEXIDINE GLUCONATE 213 G/1000ML
1 SOLUTION TOPICAL DAILY
Refills: 0 | Status: DISCONTINUED | OUTPATIENT
Start: 2020-02-07 | End: 2020-02-13

## 2020-02-07 RX ADMIN — HEPARIN SODIUM 5000 UNIT(S): 5000 INJECTION INTRAVENOUS; SUBCUTANEOUS at 13:46

## 2020-02-07 RX ADMIN — MEMANTINE HYDROCHLORIDE 10 MILLIGRAM(S): 10 TABLET ORAL at 12:20

## 2020-02-07 RX ADMIN — DIVALPROEX SODIUM 125 MILLIGRAM(S): 500 TABLET, DELAYED RELEASE ORAL at 05:25

## 2020-02-07 RX ADMIN — AMLODIPINE BESYLATE 10 MILLIGRAM(S): 2.5 TABLET ORAL at 05:25

## 2020-02-07 RX ADMIN — Medication 500 MILLIGRAM(S): at 13:47

## 2020-02-07 RX ADMIN — POLYETHYLENE GLYCOL 3350 17 GRAM(S): 17 POWDER, FOR SOLUTION ORAL at 12:19

## 2020-02-07 RX ADMIN — Medication 12.5 MILLIGRAM(S): at 05:25

## 2020-02-07 RX ADMIN — HEPARIN SODIUM 5000 UNIT(S): 5000 INJECTION INTRAVENOUS; SUBCUTANEOUS at 05:24

## 2020-02-07 RX ADMIN — Medication 500 MILLIGRAM(S): at 05:25

## 2020-02-07 RX ADMIN — Medication 2: at 12:19

## 2020-02-07 RX ADMIN — TAMSULOSIN HYDROCHLORIDE 0.4 MILLIGRAM(S): 0.4 CAPSULE ORAL at 22:27

## 2020-02-07 RX ADMIN — Medication 81 MILLIGRAM(S): at 12:20

## 2020-02-07 RX ADMIN — HEPARIN SODIUM 5000 UNIT(S): 5000 INJECTION INTRAVENOUS; SUBCUTANEOUS at 22:27

## 2020-02-07 RX ADMIN — SENNA PLUS 2 TABLET(S): 8.6 TABLET ORAL at 22:26

## 2020-02-07 RX ADMIN — Medication 1 TABLET(S): at 12:20

## 2020-02-07 RX ADMIN — Medication 1: at 07:48

## 2020-02-07 RX ADMIN — Medication 650 MILLIGRAM(S): at 05:25

## 2020-02-07 RX ADMIN — CHLORHEXIDINE GLUCONATE 1 APPLICATION(S): 213 SOLUTION TOPICAL at 17:29

## 2020-02-07 RX ADMIN — Medication 650 MILLIGRAM(S): at 22:26

## 2020-02-07 RX ADMIN — DIVALPROEX SODIUM 125 MILLIGRAM(S): 500 TABLET, DELAYED RELEASE ORAL at 17:29

## 2020-02-07 RX ADMIN — Medication 12.5 MILLIGRAM(S): at 17:29

## 2020-02-07 RX ADMIN — Medication 650 MILLIGRAM(S): at 13:46

## 2020-02-07 RX ADMIN — Medication 1: at 17:28

## 2020-02-07 NOTE — PROGRESS NOTE ADULT - SUBJECTIVE AND OBJECTIVE BOX
Patient is a 89y old  Female who presents with a chief complaint of MAGALIE (29 Jan 2020 14:20)    pt. seen and examined     INTERVAL HPI/OVERNIGHT EVENTS:  T(C): 36.4 (02-07-20 @ 12:40), Max: 36.6 (02-07-20 @ 05:23)  HR: 73 (02-07-20 @ 12:40) (73 - 84)  BP: 147/77 (02-07-20 @ 12:40) (128/73 - 147/77)  RR: 18 (02-07-20 @ 12:40) (18 - 18)  SpO2: 100% (02-07-20 @ 12:40) (98% - 100%)  Wt(kg): --  I&O's Summary    06 Feb 2020 07:01  -  07 Feb 2020 07:00  --------------------------------------------------------  IN: 650 mL / OUT: 600 mL / NET: 50 mL    07 Feb 2020 07:01  -  07 Feb 2020 22:06  --------------------------------------------------------  IN: 600 mL / OUT: 1200 mL / NET: -600 mL        PAST MEDICAL & SURGICAL HISTORY:  Obesity  Anemia  Dementia  OA (osteoarthritis)  Chronic low back pain  Shingles  DM (diabetes mellitus)  HTN (hypertension)  S/P hysterectomy with oophorectomy  Uterine fibroid      SOCIAL HISTORY  Alcohol:  Tobacco:  Illicit substance use:    FAMILY HISTORY:    REVIEW OF SYSTEMS:  CONSTITUTIONAL: No fever, weight loss, or fatigue  EYES: No eye pain, visual disturbances, or discharge  ENMT:  No difficulty hearing, tinnitus, vertigo; No sinus or throat pain  NECK: No pain or stiffness  RESPIRATORY: No cough, wheezing, chills or hemoptysis; No shortness of breath  CARDIOVASCULAR: No chest pain, palpitations, dizziness, or leg swelling  GASTROINTESTINAL: No abdominal or epigastric pain. No nausea, vomiting, or hematemesis; No diarrhea or constipation. No melena or hematochezia.  GENITOURINARY: No dysuria, frequency, hematuria, or incontinence  NEUROLOGICAL: No headaches, memory loss, loss of strength, numbness, or tremors  SKIN: No itching, burning, rashes, or lesions   LYMPH NODES: No enlarged glands  ENDOCRINE: No heat or cold intolerance; No hair loss  MUSCULOSKELETAL: No joint pain or swelling; No muscle, back, or extremity pain  PSYCHIATRIC: No depression, anxiety, mood swings, or difficulty sleeping  HEME/LYMPH: No easy bruising, or bleeding gums  ALLERY AND IMMUNOLOGIC: No hives or eczema    RADIOLOGY & ADDITIONAL TESTS:    Imaging Personally Reviewed:  [ ] YES  [ ] NO    Consultant(s) Notes Reviewed:  [ ] YES  [ ] NO    PHYSICAL EXAM:  GENERAL: NAD, well-groomed, well-developed  HEAD:  Atraumatic, Normocephalic  EYES: EOMI, PERRLA, conjunctiva and sclera clear  ENMT: No tonsillar erythema, exudates, or enlargement; Moist mucous membranes, Good dentition, No lesions  NECK: Supple, No JVD, Normal thyroid  NERVOUS SYSTEM:  Alert & Oriented X3, Good concentration; Motor Strength 5/5 B/L upper and lower extremities; DTRs 2+ intact and symmetric  CHEST/LUNG: Clear to percussion bilaterally; No rales, rhonchi, wheezing, or rubs  HEART: Regular rate and rhythm; No murmurs, rubs, or gallops  ABDOMEN: Soft, Nontender, Nondistended; Bowel sounds present  EXTREMITIES:  2+ Peripheral Pulses, No clubbing, cyanosis, or edema  LYMPH: No lymphadenopathy noted  SKIN: No rashes or lesions    LABS:                        10.1   8.98  )-----------( 134      ( 07 Feb 2020 05:44 )             32.4     02-07    135  |  105  |  31<H>  ----------------------------<  151<H>  5.0   |  18<L>  |  1.14    Ca    9.5      07 Feb 2020 05:44          CAPILLARY BLOOD GLUCOSE      POCT Blood Glucose.: 231 mg/dL (07 Feb 2020 20:58)  POCT Blood Glucose.: 165 mg/dL (07 Feb 2020 17:17)  POCT Blood Glucose.: 225 mg/dL (07 Feb 2020 11:52)  POCT Blood Glucose.: 160 mg/dL (07 Feb 2020 07:34)            MEDICATIONS  (STANDING):  amLODIPine   Tablet 10 milliGRAM(s) Oral daily  aspirin enteric coated 81 milliGRAM(s) Oral daily  chlorhexidine 4% Liquid 1 Application(s) Topical daily  dextrose 5%. 1000 milliLiter(s) (50 mL/Hr) IV Continuous <Continuous>  dextrose 50% Injectable 12.5 Gram(s) IV Push once  dextrose 50% Injectable 25 Gram(s) IV Push once  dextrose 50% Injectable 25 Gram(s) IV Push once  diVALproex Sprinkle 125 milliGRAM(s) Oral two times a day  heparin  Injectable 5000 Unit(s) SubCutaneous every 8 hours  insulin lispro (HumaLOG) corrective regimen sliding scale   SubCutaneous three times a day before meals  insulin lispro (HumaLOG) corrective regimen sliding scale   SubCutaneous at bedtime  memantine 10 milliGRAM(s) Oral daily  metoprolol tartrate 12.5 milliGRAM(s) Oral two times a day  multivitamin 1 Tablet(s) Oral daily  polyethylene glycol 3350 17 Gram(s) Oral daily  senna 2 Tablet(s) Oral at bedtime  sodium bicarbonate 650 milliGRAM(s) Oral three times a day  sodium chloride 0.9%. 1000 milliLiter(s) (60 mL/Hr) IV Continuous <Continuous>  tamsulosin 0.4 milliGRAM(s) Oral at bedtime    MEDICATIONS  (PRN):  acetaminophen   Tablet .. 650 milliGRAM(s) Oral every 6 hours PRN Temp greater or equal to 38C (100.4F), Mild Pain (1 - 3), Moderate Pain (4 - 6), Severe Pain (7 - 10)  bisacodyl 5 milliGRAM(s) Oral daily PRN Constipation  bisacodyl Suppository 10 milliGRAM(s) Rectal once PRN Constipation  dextrose 40% Gel 15 Gram(s) Oral once PRN Blood Glucose LESS THAN 70 milliGRAM(s)/deciliter  glucagon  Injectable 1 milliGRAM(s) IntraMuscular once PRN Glucose LESS THAN 70 milligrams/deciliter  meclizine 25 milliGRAM(s) Oral two times a day PRN Dizziness  ondansetron Injectable 4 milliGRAM(s) IV Push every 6 hours PRN Nausea      Care Discussed with Consultants/Other Providers [ ] YES  [ ] NO

## 2020-02-07 NOTE — CHART NOTE - NSCHARTNOTEFT_GEN_A_CORE
Have left numerous messages with son,  Mr. Hassan to discuss discharge planning and GOC.  Will continue to call.

## 2020-02-07 NOTE — CHART NOTE - NSCHARTNOTEFT_GEN_A_CORE
Source: Patient [ ]    Family [ ]     other [X] Nurse, Medical Chart   Diet rx: Consistent Carbohydrate (no snacks)      Pt 89 yo Creole speaking female appears alert, but disoriented, at time of visit. No family @ bed side. Case discussed with nurse. Per nurse Pt with good appetite/PO intake. No report of vomiting or diarrhea @ this time. No report of chewing/swallowing difficulty either. No report of pressure injury at present. RDN remains available.      Enteral/Parenteral Nutrition: N/A  Pt's weight: 75.6 Kg - 1/23/20    Pertinent Medications: Aspirin, Heparin, Insulin (Humalog), Senna, Dulcolax (PRN), Miralax, Multivitamin, Zofran (PRN),   Pertinent Labs:  02-07 Na135 mmol/L Glu 151 mg/dL<H> K+ 5.0 mmol/L Cr  1.14 mg/dL BUN 31 mg/dL<H> 02-03 Phos 3.4 mg/dL 01-23 [id number] 6.4 %<H>    Skin: +IAD/MAD   Estimated Needs:   [X] no change since previous assessment  [ ] recalculated:     Previous Nutrition Diagnosis: [X] Inadequate Energy Intake    Recommendations:   1. Rec: PO diet rx: Mechanical Soft, Consistent Carbohydrate (no snacks); PO supplement: Glucerna Therapeutic Nutrition 8oz. 2x daily (will provide additional ~440 Kcals, ~20 gm Protein);   2. Encourage & assist Pt with meals; Monitor PO diet tolerance; Honor food preferences;   3. Monitor labs, weekly weights, hydration status;

## 2020-02-08 RX ADMIN — Medication 650 MILLIGRAM(S): at 21:20

## 2020-02-08 RX ADMIN — DIVALPROEX SODIUM 125 MILLIGRAM(S): 500 TABLET, DELAYED RELEASE ORAL at 05:56

## 2020-02-08 RX ADMIN — DIVALPROEX SODIUM 125 MILLIGRAM(S): 500 TABLET, DELAYED RELEASE ORAL at 17:55

## 2020-02-08 RX ADMIN — Medication 1 TABLET(S): at 17:56

## 2020-02-08 RX ADMIN — CHLORHEXIDINE GLUCONATE 1 APPLICATION(S): 213 SOLUTION TOPICAL at 12:40

## 2020-02-08 RX ADMIN — Medication 650 MILLIGRAM(S): at 17:56

## 2020-02-08 RX ADMIN — HEPARIN SODIUM 5000 UNIT(S): 5000 INJECTION INTRAVENOUS; SUBCUTANEOUS at 12:40

## 2020-02-08 RX ADMIN — Medication 1: at 08:25

## 2020-02-08 RX ADMIN — POLYETHYLENE GLYCOL 3350 17 GRAM(S): 17 POWDER, FOR SOLUTION ORAL at 17:56

## 2020-02-08 RX ADMIN — Medication 12.5 MILLIGRAM(S): at 17:56

## 2020-02-08 RX ADMIN — AMLODIPINE BESYLATE 10 MILLIGRAM(S): 2.5 TABLET ORAL at 05:54

## 2020-02-08 RX ADMIN — Medication 650 MILLIGRAM(S): at 00:17

## 2020-02-08 RX ADMIN — HEPARIN SODIUM 5000 UNIT(S): 5000 INJECTION INTRAVENOUS; SUBCUTANEOUS at 05:55

## 2020-02-08 RX ADMIN — Medication 650 MILLIGRAM(S): at 21:35

## 2020-02-08 RX ADMIN — TAMSULOSIN HYDROCHLORIDE 0.4 MILLIGRAM(S): 0.4 CAPSULE ORAL at 21:20

## 2020-02-08 RX ADMIN — Medication 12.5 MILLIGRAM(S): at 05:55

## 2020-02-08 RX ADMIN — MEMANTINE HYDROCHLORIDE 10 MILLIGRAM(S): 10 TABLET ORAL at 17:55

## 2020-02-08 RX ADMIN — Medication 650 MILLIGRAM(S): at 05:55

## 2020-02-08 RX ADMIN — Medication 81 MILLIGRAM(S): at 17:55

## 2020-02-08 RX ADMIN — Medication 650 MILLIGRAM(S): at 01:15

## 2020-02-08 RX ADMIN — SENNA PLUS 2 TABLET(S): 8.6 TABLET ORAL at 21:20

## 2020-02-08 RX ADMIN — Medication 650 MILLIGRAM(S): at 20:40

## 2020-02-08 RX ADMIN — HEPARIN SODIUM 5000 UNIT(S): 5000 INJECTION INTRAVENOUS; SUBCUTANEOUS at 21:20

## 2020-02-08 RX ADMIN — Medication 1: at 17:55

## 2020-02-08 NOTE — PROGRESS NOTE ADULT - SUBJECTIVE AND OBJECTIVE BOX
Patient is a 89y old  Female who presents with a chief complaint of MAGALIE (29 Jan 2020 14:20)    pt. seen and examined , pulled murcia out last night    INTERVAL HPI/OVERNIGHT EVENTS:  T(C): 36.7 (02-08-20 @ 17:50), Max: 36.7 (02-08-20 @ 05:53)  HR: 75 (02-08-20 @ 17:50) (71 - 83)  BP: 137/78 (02-08-20 @ 17:50) (133/77 - 153/69)  RR: 19 (02-08-20 @ 17:50) (18 - 19)  SpO2: 96% (02-08-20 @ 17:50) (95% - 98%)  Wt(kg): --  I&O's Summary    07 Feb 2020 07:01  -  08 Feb 2020 07:00  --------------------------------------------------------  IN: 715 mL / OUT: 2000 mL / NET: -1285 mL    08 Feb 2020 07:01  -  08 Feb 2020 18:28  --------------------------------------------------------  IN: 515 mL / OUT: 700 mL / NET: -185 mL        PAST MEDICAL & SURGICAL HISTORY:  Obesity  Anemia  Dementia  OA (osteoarthritis)  Chronic low back pain  Shingles  DM (diabetes mellitus)  HTN (hypertension)  S/P hysterectomy with oophorectomy  Uterine fibroid      SOCIAL HISTORY  Alcohol:  Tobacco:  Illicit substance use:    FAMILY HISTORY:    REVIEW OF SYSTEMS:  CONSTITUTIONAL: No fever, weight loss, or fatigue  EYES: No eye pain, visual disturbances, or discharge  ENMT:  No difficulty hearing, tinnitus, vertigo; No sinus or throat pain  NECK: No pain or stiffness  RESPIRATORY: No cough, wheezing, chills or hemoptysis; No shortness of breath  CARDIOVASCULAR: No chest pain, palpitations, dizziness, or leg swelling  GASTROINTESTINAL: No abdominal or epigastric pain. No nausea, vomiting, or hematemesis; No diarrhea or constipation. No melena or hematochezia.  GENITOURINARY: No dysuria, frequency, hematuria, or incontinence  NEUROLOGICAL: No headaches, memory loss, loss of strength, numbness, or tremors  SKIN: No itching, burning, rashes, or lesions   LYMPH NODES: No enlarged glands  ENDOCRINE: No heat or cold intolerance; No hair loss  MUSCULOSKELETAL: No joint pain or swelling; No muscle, back, or extremity pain  PSYCHIATRIC: No depression, anxiety, mood swings, or difficulty sleeping  HEME/LYMPH: No easy bruising, or bleeding gums  ALLERY AND IMMUNOLOGIC: No hives or eczema    RADIOLOGY & ADDITIONAL TESTS:    Imaging Personally Reviewed:  [ ] YES  [ ] NO    Consultant(s) Notes Reviewed:  [ ] YES  [ ] NO    PHYSICAL EXAM:  GENERAL: NAD, well-groomed, well-developed  HEAD:  Atraumatic, Normocephalic  EYES: EOMI, PERRLA, conjunctiva and sclera clear  ENMT: No tonsillar erythema, exudates, or enlargement; Moist mucous membranes, Good dentition, No lesions  NECK: Supple, No JVD, Normal thyroid  NERVOUS SYSTEM:  Alert & Oriented X3, Good concentration; Motor Strength 5/5 B/L upper and lower extremities; DTRs 2+ intact and symmetric  CHEST/LUNG: Clear to percussion bilaterally; No rales, rhonchi, wheezing, or rubs  HEART: Regular rate and rhythm; No murmurs, rubs, or gallops  ABDOMEN: Soft, Nontender, Nondistended; Bowel sounds present  EXTREMITIES:  2+ Peripheral Pulses, No clubbing, cyanosis, or edema  LYMPH: No lymphadenopathy noted  SKIN: No rashes or lesions    LABS:                        10.1   8.98  )-----------( 134      ( 07 Feb 2020 05:44 )             32.4     02-07    135  |  105  |  31<H>  ----------------------------<  151<H>  5.0   |  18<L>  |  1.14    Ca    9.5      07 Feb 2020 05:44          CAPILLARY BLOOD GLUCOSE      POCT Blood Glucose.: 191 mg/dL (08 Feb 2020 17:13)  POCT Blood Glucose.: 118 mg/dL (08 Feb 2020 11:58)  POCT Blood Glucose.: 154 mg/dL (08 Feb 2020 07:36)  POCT Blood Glucose.: 231 mg/dL (07 Feb 2020 20:58)            MEDICATIONS  (STANDING):  amLODIPine   Tablet 10 milliGRAM(s) Oral daily  aspirin enteric coated 81 milliGRAM(s) Oral daily  chlorhexidine 4% Liquid 1 Application(s) Topical daily  dextrose 5%. 1000 milliLiter(s) (50 mL/Hr) IV Continuous <Continuous>  dextrose 50% Injectable 12.5 Gram(s) IV Push once  dextrose 50% Injectable 25 Gram(s) IV Push once  dextrose 50% Injectable 25 Gram(s) IV Push once  diVALproex Sprinkle 125 milliGRAM(s) Oral two times a day  heparin  Injectable 5000 Unit(s) SubCutaneous every 8 hours  insulin lispro (HumaLOG) corrective regimen sliding scale   SubCutaneous three times a day before meals  insulin lispro (HumaLOG) corrective regimen sliding scale   SubCutaneous at bedtime  memantine 10 milliGRAM(s) Oral daily  metoprolol tartrate 12.5 milliGRAM(s) Oral two times a day  multivitamin 1 Tablet(s) Oral daily  polyethylene glycol 3350 17 Gram(s) Oral daily  senna 2 Tablet(s) Oral at bedtime  sodium bicarbonate 650 milliGRAM(s) Oral three times a day  sodium chloride 0.9%. 1000 milliLiter(s) (60 mL/Hr) IV Continuous <Continuous>  tamsulosin 0.4 milliGRAM(s) Oral at bedtime    MEDICATIONS  (PRN):  acetaminophen   Tablet .. 650 milliGRAM(s) Oral every 6 hours PRN Temp greater or equal to 38C (100.4F), Mild Pain (1 - 3), Moderate Pain (4 - 6), Severe Pain (7 - 10)  bisacodyl 5 milliGRAM(s) Oral daily PRN Constipation  bisacodyl Suppository 10 milliGRAM(s) Rectal once PRN Constipation  dextrose 40% Gel 15 Gram(s) Oral once PRN Blood Glucose LESS THAN 70 milliGRAM(s)/deciliter  glucagon  Injectable 1 milliGRAM(s) IntraMuscular once PRN Glucose LESS THAN 70 milligrams/deciliter  meclizine 25 milliGRAM(s) Oral two times a day PRN Dizziness  ondansetron Injectable 4 milliGRAM(s) IV Push every 6 hours PRN Nausea      Care Discussed with Consultants/Other Providers [ ] YES  [ ] NO

## 2020-02-08 NOTE — PROGRESS NOTE ADULT - SUBJECTIVE AND OBJECTIVE BOX
[facility] Neurological Care [facility]      Seen earlier today, and examined.  - Today, patient is without complaints.           *****MEDICATIONS: Current medication reviewed and documented.    MEDICATIONS  (STANDING):  amLODIPine   Tablet 10 milliGRAM(s) Oral daily  aspirin enteric coated 81 milliGRAM(s) Oral daily  chlorhexidine 4% Liquid 1 Application(s) Topical daily  dextrose 5%. 1000 milliLiter(s) (50 mL/Hr) IV Continuous <Continuous>  dextrose 50% Injectable 12.5 Gram(s) IV Push once  dextrose 50% Injectable 25 Gram(s) IV Push once  dextrose 50% Injectable 25 Gram(s) IV Push once  diVALproex Sprinkle 125 milliGRAM(s) Oral two times a day  heparin  Injectable 5000 Unit(s) SubCutaneous every 8 hours  insulin lispro (HumaLOG) corrective regimen sliding scale   SubCutaneous three times a day before meals  insulin lispro (HumaLOG) corrective regimen sliding scale   SubCutaneous at bedtime  memantine 10 milliGRAM(s) Oral daily  metoprolol tartrate 12.5 milliGRAM(s) Oral two times a day  multivitamin 1 Tablet(s) Oral daily  polyethylene glycol 3350 17 Gram(s) Oral daily  senna 2 Tablet(s) Oral at bedtime  sodium bicarbonate 650 milliGRAM(s) Oral three times a day  sodium chloride 0.9%. 1000 milliLiter(s) (60 mL/Hr) IV Continuous <Continuous>  tamsulosin 0.4 milliGRAM(s) Oral at bedtime    MEDICATIONS  (PRN):  acetaminophen   Tablet .. 650 milliGRAM(s) Oral every 6 hours PRN Temp greater or equal to 38C (100.4F), Mild Pain (1 - 3), Moderate Pain (4 - 6), Severe Pain (7 - 10)  bisacodyl 5 milliGRAM(s) Oral daily PRN Constipation  bisacodyl Suppository 10 milliGRAM(s) Rectal once PRN Constipation  dextrose 40% Gel 15 Gram(s) Oral once PRN Blood Glucose LESS THAN 70 milliGRAM(s)/deciliter  glucagon  Injectable 1 milliGRAM(s) IntraMuscular once PRN Glucose LESS THAN 70 milligrams/deciliter  meclizine 25 milliGRAM(s) Oral two times a day PRN Dizziness  ondansetron Injectable 4 milliGRAM(s) IV Push every 6 hours PRN Nausea          ***** VITAL SIGNS:  T(F): 98.1 (02-08-20 @ 05:53), Max: 98.1 (02-08-20 @ 05:53)  HR: 82 (02-08-20 @ 05:53) (73 - 83)  BP: 150/75 (02-08-20 @ 05:53) (147/77 - 153/69)  RR: 18 (02-08-20 @ 05:53) (18 - 18)  SpO2: 95% (02-08-20 @ 05:53) (95% - 100%)  Wt(kg): --  ,   I&O's Summary    07 Feb 2020 07:01  -  08 Feb 2020 07:00  --------------------------------------------------------  IN: 715 mL / OUT: 2000 mL / NET: -1285 mL             *****PHYSICAL EXAM:   alert oriented x 2 attention comprehension are better appears pleasant   as per aide agitation improved   EOmi fundi not visualized   no nystagmus VFF to confrontation  Tongue is midline  Palate elevates symmetrically   Moving all 4 ext spontaneously no drift appreciated    Gait not assessed.                      *****LAB AND IMAGING:                        10.1   8.98  )-----------( 134      ( 07 Feb 2020 05:44 )             32.4               02-07    135  |  105  |  31<H>  ----------------------------<  151<H>  5.0   |  18<L>  |  1.14    Ca    9.5      07 Feb 2020 05:44                           [All pertinent recent Imaging/Reports reviewed]           *****A S S E S S M E N T   A N D   P L A N :         Excerpt from H&P  89 Creole speaking female, h/o dementia, DM2, HTN,  presents from aide for evaluation of AMS. Patient is a poor historian, AAOx2 at baseline and hard of hearing. Unable to obtain history. Patient denies symptoms. She reports that she was on a train [place] and is unable to recall how she ended up at the hospital. Unable to reach family members for collateral information.   Currently, only has mild back pain, denies any CP or SOB.         limited exam as pt is not cooperative.  Problem/Recommendations 1 waxing and waning mental status.   apparently pulled out murcia overnight   depakote bid for beh management     Problem/Recommendations 2: lethargy improved.   avoid any sedation   ct head unremarkable   eeg did not reveal any sz      Thank you for allowing me to participate in the care of this patient. Please do not hesitate to call me if you have any  questions.        ________________  Stormy Chavira MD  [facility]rological [facility]  [phone]      33 minutes spent on total encounter; more than 50 % of the visit was  spent counseling about plan of care, compliance to diet/exercise and medication regimen and or  coordinating care by the attending physician.      It is advised that stroke patients follow up with DELILAH Callahan @ [phone] in 1- 2 weeks.   Others please follow up with Dr. Michael Nissenbaum [phone]

## 2020-02-09 RX ADMIN — HEPARIN SODIUM 5000 UNIT(S): 5000 INJECTION INTRAVENOUS; SUBCUTANEOUS at 22:19

## 2020-02-09 RX ADMIN — Medication 650 MILLIGRAM(S): at 05:07

## 2020-02-09 RX ADMIN — CHLORHEXIDINE GLUCONATE 1 APPLICATION(S): 213 SOLUTION TOPICAL at 14:56

## 2020-02-09 RX ADMIN — Medication 650 MILLIGRAM(S): at 15:50

## 2020-02-09 RX ADMIN — Medication 12.5 MILLIGRAM(S): at 05:07

## 2020-02-09 RX ADMIN — HEPARIN SODIUM 5000 UNIT(S): 5000 INJECTION INTRAVENOUS; SUBCUTANEOUS at 14:56

## 2020-02-09 RX ADMIN — Medication 650 MILLIGRAM(S): at 06:40

## 2020-02-09 RX ADMIN — Medication 1 TABLET(S): at 14:55

## 2020-02-09 RX ADMIN — DIVALPROEX SODIUM 125 MILLIGRAM(S): 500 TABLET, DELAYED RELEASE ORAL at 05:07

## 2020-02-09 RX ADMIN — DIVALPROEX SODIUM 125 MILLIGRAM(S): 500 TABLET, DELAYED RELEASE ORAL at 17:15

## 2020-02-09 RX ADMIN — Medication 12.5 MILLIGRAM(S): at 17:15

## 2020-02-09 RX ADMIN — TAMSULOSIN HYDROCHLORIDE 0.4 MILLIGRAM(S): 0.4 CAPSULE ORAL at 22:20

## 2020-02-09 RX ADMIN — POLYETHYLENE GLYCOL 3350 17 GRAM(S): 17 POWDER, FOR SOLUTION ORAL at 14:55

## 2020-02-09 RX ADMIN — MEMANTINE HYDROCHLORIDE 10 MILLIGRAM(S): 10 TABLET ORAL at 14:55

## 2020-02-09 RX ADMIN — Medication 650 MILLIGRAM(S): at 14:58

## 2020-02-09 RX ADMIN — Medication 650 MILLIGRAM(S): at 22:20

## 2020-02-09 RX ADMIN — Medication 81 MILLIGRAM(S): at 14:55

## 2020-02-09 RX ADMIN — Medication 650 MILLIGRAM(S): at 05:42

## 2020-02-09 RX ADMIN — Medication 650 MILLIGRAM(S): at 14:55

## 2020-02-09 RX ADMIN — SENNA PLUS 2 TABLET(S): 8.6 TABLET ORAL at 22:20

## 2020-02-09 RX ADMIN — Medication 2: at 12:26

## 2020-02-09 RX ADMIN — HEPARIN SODIUM 5000 UNIT(S): 5000 INJECTION INTRAVENOUS; SUBCUTANEOUS at 05:07

## 2020-02-09 RX ADMIN — AMLODIPINE BESYLATE 10 MILLIGRAM(S): 2.5 TABLET ORAL at 05:07

## 2020-02-09 NOTE — PROGRESS NOTE ADULT - SUBJECTIVE AND OBJECTIVE BOX
Patient is a 89y old  Female who presents with a chief complaint of MAGALIE (29 Jan 2020 14:20)    pt. seen and examined     INTERVAL HPI/OVERNIGHT EVENTS:  T(C): 37.1 (02-09-20 @ 17:11), Max: 37.1 (02-09-20 @ 17:11)  HR: 99 (02-09-20 @ 17:11) (68 - 99)  BP: 129/67 (02-09-20 @ 17:11) (129/67 - 152/80)  RR: 18 (02-09-20 @ 17:11) (17 - 18)  SpO2: 99% (02-09-20 @ 17:11) (99% - 100%)  Wt(kg): --  I&O's Summary    08 Feb 2020 07:01  -  09 Feb 2020 07:00  --------------------------------------------------------  IN: 983 mL / OUT: 1250 mL / NET: -267 mL    09 Feb 2020 07:01  -  09 Feb 2020 18:38  --------------------------------------------------------  IN: 598 mL / OUT: 300 mL / NET: 298 mL        PAST MEDICAL & SURGICAL HISTORY:  Obesity  Anemia  Dementia  OA (osteoarthritis)  Chronic low back pain  Shingles  DM (diabetes mellitus)  HTN (hypertension)  S/P hysterectomy with oophorectomy  Uterine fibroid      SOCIAL HISTORY  Alcohol:  Tobacco:  Illicit substance use:    FAMILY HISTORY:    REVIEW OF SYSTEMS:  CONSTITUTIONAL: No fever, weight loss, or fatigue  EYES: No eye pain, visual disturbances, or discharge  ENMT:  No difficulty hearing, tinnitus, vertigo; No sinus or throat pain  NECK: No pain or stiffness  RESPIRATORY: No cough, wheezing, chills or hemoptysis; No shortness of breath  CARDIOVASCULAR: No chest pain, palpitations, dizziness, or leg swelling  GASTROINTESTINAL: No abdominal or epigastric pain. No nausea, vomiting, or hematemesis; No diarrhea or constipation. No melena or hematochezia.  GENITOURINARY: No dysuria, frequency, hematuria, or incontinence  NEUROLOGICAL: No headaches, memory loss, loss of strength, numbness, or tremors  SKIN: No itching, burning, rashes, or lesions   LYMPH NODES: No enlarged glands  ENDOCRINE: No heat or cold intolerance; No hair loss  MUSCULOSKELETAL: No joint pain or swelling; No muscle, back, or extremity pain  PSYCHIATRIC: No depression, anxiety, mood swings, or difficulty sleeping  HEME/LYMPH: No easy bruising, or bleeding gums  ALLERY AND IMMUNOLOGIC: No hives or eczema    RADIOLOGY & ADDITIONAL TESTS:    Imaging Personally Reviewed:  [ ] YES  [ ] NO    Consultant(s) Notes Reviewed:  [ ] YES  [ ] NO    PHYSICAL EXAM:  GENERAL: NAD, well-groomed, well-developed  HEAD:  Atraumatic, Normocephalic  EYES: EOMI, PERRLA, conjunctiva and sclera clear  ENMT: No tonsillar erythema, exudates, or enlargement; Moist mucous membranes, Good dentition, No lesions  NECK: Supple, No JVD, Normal thyroid  NERVOUS SYSTEM:  Alert & Oriented X3, Good concentration; Motor Strength 5/5 B/L upper and lower extremities; DTRs 2+ intact and symmetric  CHEST/LUNG: Clear to percussion bilaterally; No rales, rhonchi, wheezing, or rubs  HEART: Regular rate and rhythm; No murmurs, rubs, or gallops  ABDOMEN: Soft, Nontender, Nondistended; Bowel sounds present  EXTREMITIES:  2+ Peripheral Pulses, No clubbing, cyanosis, or edema  LYMPH: No lymphadenopathy noted  SKIN: No rashes or lesions    LABS:              CAPILLARY BLOOD GLUCOSE      POCT Blood Glucose.: 144 mg/dL (09 Feb 2020 16:47)  POCT Blood Glucose.: 207 mg/dL (09 Feb 2020 11:48)  POCT Blood Glucose.: 141 mg/dL (09 Feb 2020 07:59)  POCT Blood Glucose.: 186 mg/dL (08 Feb 2020 21:12)            MEDICATIONS  (STANDING):  amLODIPine   Tablet 10 milliGRAM(s) Oral daily  aspirin enteric coated 81 milliGRAM(s) Oral daily  chlorhexidine 4% Liquid 1 Application(s) Topical daily  dextrose 5%. 1000 milliLiter(s) (50 mL/Hr) IV Continuous <Continuous>  dextrose 50% Injectable 12.5 Gram(s) IV Push once  dextrose 50% Injectable 25 Gram(s) IV Push once  dextrose 50% Injectable 25 Gram(s) IV Push once  diVALproex Sprinkle 125 milliGRAM(s) Oral two times a day  heparin  Injectable 5000 Unit(s) SubCutaneous every 8 hours  insulin lispro (HumaLOG) corrective regimen sliding scale   SubCutaneous three times a day before meals  insulin lispro (HumaLOG) corrective regimen sliding scale   SubCutaneous at bedtime  memantine 10 milliGRAM(s) Oral daily  metoprolol tartrate 12.5 milliGRAM(s) Oral two times a day  multivitamin 1 Tablet(s) Oral daily  polyethylene glycol 3350 17 Gram(s) Oral daily  senna 2 Tablet(s) Oral at bedtime  sodium bicarbonate 650 milliGRAM(s) Oral three times a day  sodium chloride 0.9%. 1000 milliLiter(s) (60 mL/Hr) IV Continuous <Continuous>  tamsulosin 0.4 milliGRAM(s) Oral at bedtime    MEDICATIONS  (PRN):  acetaminophen   Tablet .. 650 milliGRAM(s) Oral every 6 hours PRN Temp greater or equal to 38C (100.4F), Mild Pain (1 - 3), Moderate Pain (4 - 6), Severe Pain (7 - 10)  bisacodyl 5 milliGRAM(s) Oral daily PRN Constipation  bisacodyl Suppository 10 milliGRAM(s) Rectal once PRN Constipation  dextrose 40% Gel 15 Gram(s) Oral once PRN Blood Glucose LESS THAN 70 milliGRAM(s)/deciliter  glucagon  Injectable 1 milliGRAM(s) IntraMuscular once PRN Glucose LESS THAN 70 milligrams/deciliter  meclizine 25 milliGRAM(s) Oral two times a day PRN Dizziness  ondansetron Injectable 4 milliGRAM(s) IV Push every 6 hours PRN Nausea      Care Discussed with Consultants/Other Providers [ ] YES  [ ] NO

## 2020-02-10 LAB
ANION GAP SERPL CALC-SCNC: 13 MMO/L — SIGNIFICANT CHANGE UP (ref 7–14)
BUN SERPL-MCNC: 23 MG/DL — SIGNIFICANT CHANGE UP (ref 7–23)
CALCIUM SERPL-MCNC: 9.5 MG/DL — SIGNIFICANT CHANGE UP (ref 8.4–10.5)
CHLORIDE SERPL-SCNC: 105 MMOL/L — SIGNIFICANT CHANGE UP (ref 98–107)
CO2 SERPL-SCNC: 23 MMOL/L — SIGNIFICANT CHANGE UP (ref 22–31)
CREAT SERPL-MCNC: 1.24 MG/DL — SIGNIFICANT CHANGE UP (ref 0.5–1.3)
GLUCOSE SERPL-MCNC: 176 MG/DL — HIGH (ref 70–99)
HCT VFR BLD CALC: 32.1 % — LOW (ref 34.5–45)
HGB BLD-MCNC: 9.9 G/DL — LOW (ref 11.5–15.5)
MAGNESIUM SERPL-MCNC: 2.2 MG/DL — SIGNIFICANT CHANGE UP (ref 1.6–2.6)
MCHC RBC-ENTMCNC: 30.8 % — LOW (ref 32–36)
MCHC RBC-ENTMCNC: 32.4 PG — SIGNIFICANT CHANGE UP (ref 27–34)
MCV RBC AUTO: 104.9 FL — HIGH (ref 80–100)
NRBC # FLD: 0 K/UL — SIGNIFICANT CHANGE UP (ref 0–0)
PHOSPHATE SERPL-MCNC: 2.7 MG/DL — SIGNIFICANT CHANGE UP (ref 2.5–4.5)
PLATELET # BLD AUTO: 264 K/UL — SIGNIFICANT CHANGE UP (ref 150–400)
PMV BLD: 11 FL — SIGNIFICANT CHANGE UP (ref 7–13)
POTASSIUM SERPL-MCNC: 4.1 MMOL/L — SIGNIFICANT CHANGE UP (ref 3.5–5.3)
POTASSIUM SERPL-SCNC: 4.1 MMOL/L — SIGNIFICANT CHANGE UP (ref 3.5–5.3)
RBC # BLD: 3.06 M/UL — LOW (ref 3.8–5.2)
RBC # FLD: 13.2 % — SIGNIFICANT CHANGE UP (ref 10.3–14.5)
SODIUM SERPL-SCNC: 141 MMOL/L — SIGNIFICANT CHANGE UP (ref 135–145)
WBC # BLD: 10.52 K/UL — HIGH (ref 3.8–10.5)
WBC # FLD AUTO: 10.52 K/UL — HIGH (ref 3.8–10.5)

## 2020-02-10 RX ORDER — LANOLIN ALCOHOL/MO/W.PET/CERES
3 CREAM (GRAM) TOPICAL ONCE
Refills: 0 | Status: COMPLETED | OUTPATIENT
Start: 2020-02-10 | End: 2020-02-10

## 2020-02-10 RX ORDER — HALOPERIDOL DECANOATE 100 MG/ML
0.25 INJECTION INTRAMUSCULAR ONCE
Refills: 0 | Status: DISCONTINUED | OUTPATIENT
Start: 2020-02-10 | End: 2020-02-10

## 2020-02-10 RX ORDER — QUETIAPINE FUMARATE 200 MG/1
12.5 TABLET, FILM COATED ORAL ONCE
Refills: 0 | Status: COMPLETED | OUTPATIENT
Start: 2020-02-10 | End: 2020-02-10

## 2020-02-10 RX ADMIN — HEPARIN SODIUM 5000 UNIT(S): 5000 INJECTION INTRAVENOUS; SUBCUTANEOUS at 05:18

## 2020-02-10 RX ADMIN — Medication 1 TABLET(S): at 15:09

## 2020-02-10 RX ADMIN — Medication 3 MILLIGRAM(S): at 03:43

## 2020-02-10 RX ADMIN — Medication 81 MILLIGRAM(S): at 15:09

## 2020-02-10 RX ADMIN — AMLODIPINE BESYLATE 10 MILLIGRAM(S): 2.5 TABLET ORAL at 05:18

## 2020-02-10 RX ADMIN — Medication 12.5 MILLIGRAM(S): at 05:18

## 2020-02-10 RX ADMIN — POLYETHYLENE GLYCOL 3350 17 GRAM(S): 17 POWDER, FOR SOLUTION ORAL at 15:09

## 2020-02-10 RX ADMIN — Medication 650 MILLIGRAM(S): at 05:18

## 2020-02-10 RX ADMIN — Medication 650 MILLIGRAM(S): at 15:09

## 2020-02-10 RX ADMIN — Medication 1: at 17:10

## 2020-02-10 RX ADMIN — TAMSULOSIN HYDROCHLORIDE 0.4 MILLIGRAM(S): 0.4 CAPSULE ORAL at 21:47

## 2020-02-10 RX ADMIN — Medication 12.5 MILLIGRAM(S): at 17:10

## 2020-02-10 RX ADMIN — Medication 650 MILLIGRAM(S): at 21:47

## 2020-02-10 RX ADMIN — SENNA PLUS 2 TABLET(S): 8.6 TABLET ORAL at 21:47

## 2020-02-10 RX ADMIN — DIVALPROEX SODIUM 125 MILLIGRAM(S): 500 TABLET, DELAYED RELEASE ORAL at 17:10

## 2020-02-10 RX ADMIN — CHLORHEXIDINE GLUCONATE 1 APPLICATION(S): 213 SOLUTION TOPICAL at 15:23

## 2020-02-10 RX ADMIN — MEMANTINE HYDROCHLORIDE 10 MILLIGRAM(S): 10 TABLET ORAL at 15:09

## 2020-02-10 RX ADMIN — HEPARIN SODIUM 5000 UNIT(S): 5000 INJECTION INTRAVENOUS; SUBCUTANEOUS at 15:09

## 2020-02-10 RX ADMIN — HEPARIN SODIUM 5000 UNIT(S): 5000 INJECTION INTRAVENOUS; SUBCUTANEOUS at 21:54

## 2020-02-10 RX ADMIN — Medication 1: at 12:35

## 2020-02-10 RX ADMIN — Medication 1: at 08:18

## 2020-02-10 RX ADMIN — DIVALPROEX SODIUM 125 MILLIGRAM(S): 500 TABLET, DELAYED RELEASE ORAL at 05:18

## 2020-02-10 NOTE — CHART NOTE - NSCHARTNOTEFT_GEN_A_CORE
D/w DC Plan with Son Pavan.  Sw discuss pt needs 24 hour supervision at home if son is not able to take responsibility for pts care pt needs to go to LTC facility

## 2020-02-10 NOTE — PROGRESS NOTE ADULT - SUBJECTIVE AND OBJECTIVE BOX
[facility] Neurological Care [facility]      Seen earlier today, and examined.  - Today, patient is without complaints.           *****MEDICATIONS: Current medication reviewed and documented.    MEDICATIONS  (STANDING):  amLODIPine   Tablet 10 milliGRAM(s) Oral daily  aspirin enteric coated 81 milliGRAM(s) Oral daily  chlorhexidine 4% Liquid 1 Application(s) Topical daily  dextrose 5%. 1000 milliLiter(s) (50 mL/Hr) IV Continuous <Continuous>  dextrose 50% Injectable 12.5 Gram(s) IV Push once  dextrose 50% Injectable 25 Gram(s) IV Push once  dextrose 50% Injectable 25 Gram(s) IV Push once  diVALproex Sprinkle 125 milliGRAM(s) Oral two times a day  heparin  Injectable 5000 Unit(s) SubCutaneous every 8 hours  insulin lispro (HumaLOG) corrective regimen sliding scale   SubCutaneous three times a day before meals  insulin lispro (HumaLOG) corrective regimen sliding scale   SubCutaneous at bedtime  memantine 10 milliGRAM(s) Oral daily  metoprolol tartrate 12.5 milliGRAM(s) Oral two times a day  multivitamin 1 Tablet(s) Oral daily  polyethylene glycol 3350 17 Gram(s) Oral daily  senna 2 Tablet(s) Oral at bedtime  sodium bicarbonate 650 milliGRAM(s) Oral three times a day  sodium chloride 0.9%. 1000 milliLiter(s) (60 mL/Hr) IV Continuous <Continuous>  tamsulosin 0.4 milliGRAM(s) Oral at bedtime    MEDICATIONS  (PRN):  acetaminophen   Tablet .. 650 milliGRAM(s) Oral every 6 hours PRN Temp greater or equal to 38C (100.4F), Mild Pain (1 - 3), Moderate Pain (4 - 6), Severe Pain (7 - 10)  bisacodyl 5 milliGRAM(s) Oral daily PRN Constipation  bisacodyl Suppository 10 milliGRAM(s) Rectal once PRN Constipation  dextrose 40% Gel 15 Gram(s) Oral once PRN Blood Glucose LESS THAN 70 milliGRAM(s)/deciliter  glucagon  Injectable 1 milliGRAM(s) IntraMuscular once PRN Glucose LESS THAN 70 milligrams/deciliter  meclizine 25 milliGRAM(s) Oral two times a day PRN Dizziness  ondansetron Injectable 4 milliGRAM(s) IV Push every 6 hours PRN Nausea          ***** VITAL SIGNS:  T(F): 98 (02-10-20 @ 05:17), Max: 98.8 (20 @ 17:11)  HR: 86 (02-10-20 @ 05:17) (68 - 99)  BP: 146/76 (02-10-20 @ 05:17) (126/72 - 146/76)  RR: 17 (02-10-20 @ 05:17) (17 - 18)  SpO2: 97% (02-10-20 @ 05:17) (97% - 100%)  Wt(kg): --  ,   I&O's Summary    2020 07:  -  10 Feb 2020 07:00  --------------------------------------------------------  IN: 1356 mL / OUT: 1140 mL / NET: 216 mL    10 Feb 2020 07:01  -  10 Feb 2020 11:43  --------------------------------------------------------  IN: 240 mL / OUT: 300 mL / NET: -60 mL             *****PHYSICAL EXAM:  smiling       alert able to say " im fine in english"   following some simple commmands intermittently   limited exam as pt is not fully cooperative   EOmi fundi not visualized blinks to threat   no nystagmus VFF to confrontation  Tongue is midline  Palate elevates symmetrically   Moving both upper ext   refusing to move le   Gait not assessed.        *****LAB AND IMAGIN.9    10.52 )-----------( 264      ( 10 Feb 2020 06:34 )             32.1               02-10    141  |  105  |  23  ----------------------------<  176<H>  4.1   |  23  |  1.24    Ca    9.5      10 Feb 2020 06:34  Phos  2.7     02-10  Mg     2.2     02-10                           [All pertinent recent Imaging/Reports reviewed]           *****A S S E S S M E N T   A N D   P L A N :       Excerpt from H&P  89 Creole speaking female, h/o dementia, DM2, HTN,  presents from aide for evaluation of AMS. Patient is a poor historian, AAOx2 at baseline and hard of hearing. Unable to obtain history. Patient denies symptoms. She reports that she was on a train [place] and is unable to recall how she ended up at the hospital. Unable to reach family members for collateral information.   Currently, only has mild back pain, denies any CP or SOB.         limited exam as pt is not cooperative.  Problem/Recommendations 1 waxing and waning mental status.      depakote bid for beh management     Problem/Recommendations 2: lethargy improved.   avoid any sedation   ct head unremarkable   eeg did not reveal any sz       Thank you for allowing me to participate in the care of this patient. Please do not hesitate to call me if you have any  questions.        ________________  Stormy Chavira MD  [facility] Neurological [facility] ([facility]  [phone]      33 minutes spent on total encounter; more than 50 % of the visit was  spent counseling about plan of care, compliance to diet/exercise and medication regimen and or  coordinating care by the attending physician.      It is advised that stroke patients follow up with NP Jeanette Callahan @ [phone] in 1- 2 weeks.   Others please follow up with Dr. Michael Nissenbaum [phone]

## 2020-02-10 NOTE — CHART NOTE - NSCHARTNOTEFT_GEN_A_CORE
Late Note: pt ripped murcia out. Trial of void  failed allowed 8 hours for pt to urinate .  Pt did not void.  Bladder scan>300: murcia replaced.  Pt is with a sitter will order seroquel if pt has agitation

## 2020-02-10 NOTE — PROGRESS NOTE ADULT - SUBJECTIVE AND OBJECTIVE BOX
Patient is a 89y old  Female who presents with a chief complaint of MAGALIE (29 Jan 2020 14:20)    pt. seen and examined , NAD   INTERVAL HPI/OVERNIGHT EVENTS:  T(C): 36.6 (02-11-20 @ 00:09), Max: 37 (02-10-20 @ 12:19)  HR: 88 (02-11-20 @ 00:09) (74 - 95)  BP: 121/64 (02-11-20 @ 00:09) (108/59 - 146/76)  RR: 17 (02-11-20 @ 00:09) (17 - 18)  SpO2: 100% (02-11-20 @ 00:09) (97% - 100%)  Wt(kg): --  I&O's Summary    09 Feb 2020 07:01  -  10 Feb 2020 07:00  --------------------------------------------------------  IN: 1356 mL / OUT: 1140 mL / NET: 216 mL    10 Feb 2020 07:01  -  11 Feb 2020 01:55  --------------------------------------------------------  IN: 594 mL / OUT: 950 mL / NET: -356 mL        PAST MEDICAL & SURGICAL HISTORY:  Obesity  Anemia  Dementia  OA (osteoarthritis)  Chronic low back pain  Shingles  DM (diabetes mellitus)  HTN (hypertension)  S/P hysterectomy with oophorectomy  Uterine fibroid      SOCIAL HISTORY  Alcohol:  Tobacco:  Illicit substance use:    FAMILY HISTORY:    REVIEW OF SYSTEMS:  CONSTITUTIONAL: No fever, weight loss, or fatigue  EYES: No eye pain, visual disturbances, or discharge  ENMT:  No difficulty hearing, tinnitus, vertigo; No sinus or throat pain  NECK: No pain or stiffness  RESPIRATORY: No cough, wheezing, chills or hemoptysis; No shortness of breath  CARDIOVASCULAR: No chest pain, palpitations, dizziness, or leg swelling  GASTROINTESTINAL: No abdominal or epigastric pain. No nausea, vomiting, or hematemesis; No diarrhea or constipation. No melena or hematochezia.  GENITOURINARY: No dysuria, frequency, hematuria, or incontinence  NEUROLOGICAL: No headaches, memory loss, loss of strength, numbness, or tremors  SKIN: No itching, burning, rashes, or lesions   LYMPH NODES: No enlarged glands  ENDOCRINE: No heat or cold intolerance; No hair loss  MUSCULOSKELETAL: No joint pain or swelling; No muscle, back, or extremity pain  PSYCHIATRIC: No depression, anxiety, mood swings, or difficulty sleeping  HEME/LYMPH: No easy bruising, or bleeding gums  ALLERY AND IMMUNOLOGIC: No hives or eczema    RADIOLOGY & ADDITIONAL TESTS:    Imaging Personally Reviewed:  [ ] YES  [ ] NO    Consultant(s) Notes Reviewed:  [ ] YES  [ ] NO    PHYSICAL EXAM:  GENERAL: NAD, well-groomed, well-developed  HEAD:  Atraumatic, Normocephalic  EYES: EOMI, PERRLA, conjunctiva and sclera clear  ENMT: No tonsillar erythema, exudates, or enlargement; Moist mucous membranes, Good dentition, No lesions  NECK: Supple, No JVD, Normal thyroid  NERVOUS SYSTEM:  Alert & Oriented X3, Good concentration; Motor Strength 5/5 B/L upper and lower extremities; DTRs 2+ intact and symmetric  CHEST/LUNG: Clear to percussion bilaterally; No rales, rhonchi, wheezing, or rubs  HEART: Regular rate and rhythm; No murmurs, rubs, or gallops  ABDOMEN: Soft, Nontender, Nondistended; Bowel sounds present  EXTREMITIES:  2+ Peripheral Pulses, No clubbing, cyanosis, or edema  LYMPH: No lymphadenopathy noted  SKIN: No rashes or lesions    LABS:                        9.9    10.52 )-----------( 264      ( 10 Feb 2020 06:34 )             32.1     02-10    141  |  105  |  23  ----------------------------<  176<H>  4.1   |  23  |  1.24    Ca    9.5      10 Feb 2020 06:34  Phos  2.7     02-10  Mg     2.2     02-10          CAPILLARY BLOOD GLUCOSE      POCT Blood Glucose.: 144 mg/dL (10 Feb 2020 21:02)  POCT Blood Glucose.: 184 mg/dL (10 Feb 2020 16:58)  POCT Blood Glucose.: 185 mg/dL (10 Feb 2020 11:43)  POCT Blood Glucose.: 162 mg/dL (10 Feb 2020 07:43)            MEDICATIONS  (STANDING):  amLODIPine   Tablet 10 milliGRAM(s) Oral daily  aspirin enteric coated 81 milliGRAM(s) Oral daily  chlorhexidine 4% Liquid 1 Application(s) Topical daily  dextrose 5%. 1000 milliLiter(s) (50 mL/Hr) IV Continuous <Continuous>  dextrose 50% Injectable 12.5 Gram(s) IV Push once  dextrose 50% Injectable 25 Gram(s) IV Push once  dextrose 50% Injectable 25 Gram(s) IV Push once  diVALproex Sprinkle 125 milliGRAM(s) Oral two times a day  heparin  Injectable 5000 Unit(s) SubCutaneous every 8 hours  insulin lispro (HumaLOG) corrective regimen sliding scale   SubCutaneous three times a day before meals  insulin lispro (HumaLOG) corrective regimen sliding scale   SubCutaneous at bedtime  memantine 10 milliGRAM(s) Oral daily  metoprolol tartrate 12.5 milliGRAM(s) Oral two times a day  multivitamin 1 Tablet(s) Oral daily  polyethylene glycol 3350 17 Gram(s) Oral daily  senna 2 Tablet(s) Oral at bedtime  sodium bicarbonate 650 milliGRAM(s) Oral three times a day  sodium chloride 0.9%. 1000 milliLiter(s) (60 mL/Hr) IV Continuous <Continuous>  tamsulosin 0.4 milliGRAM(s) Oral at bedtime    MEDICATIONS  (PRN):  acetaminophen   Tablet .. 650 milliGRAM(s) Oral every 6 hours PRN Temp greater or equal to 38C (100.4F), Mild Pain (1 - 3), Moderate Pain (4 - 6), Severe Pain (7 - 10)  bisacodyl 5 milliGRAM(s) Oral daily PRN Constipation  bisacodyl Suppository 10 milliGRAM(s) Rectal once PRN Constipation  dextrose 40% Gel 15 Gram(s) Oral once PRN Blood Glucose LESS THAN 70 milliGRAM(s)/deciliter  glucagon  Injectable 1 milliGRAM(s) IntraMuscular once PRN Glucose LESS THAN 70 milligrams/deciliter  meclizine 25 milliGRAM(s) Oral two times a day PRN Dizziness  ondansetron Injectable 4 milliGRAM(s) IV Push every 6 hours PRN Nausea      Care Discussed with Consultants/Other Providers [ ] YES  [ ] NO

## 2020-02-11 LAB
ANION GAP SERPL CALC-SCNC: 9 MMO/L — SIGNIFICANT CHANGE UP (ref 7–14)
BUN SERPL-MCNC: 20 MG/DL — SIGNIFICANT CHANGE UP (ref 7–23)
CALCIUM SERPL-MCNC: 9.6 MG/DL — SIGNIFICANT CHANGE UP (ref 8.4–10.5)
CHLORIDE SERPL-SCNC: 106 MMOL/L — SIGNIFICANT CHANGE UP (ref 98–107)
CO2 SERPL-SCNC: 25 MMOL/L — SIGNIFICANT CHANGE UP (ref 22–31)
CREAT SERPL-MCNC: 1.17 MG/DL — SIGNIFICANT CHANGE UP (ref 0.5–1.3)
GLUCOSE SERPL-MCNC: 143 MG/DL — HIGH (ref 70–99)
HCT VFR BLD CALC: 30 % — LOW (ref 34.5–45)
HGB BLD-MCNC: 9.3 G/DL — LOW (ref 11.5–15.5)
MAGNESIUM SERPL-MCNC: 2 MG/DL — SIGNIFICANT CHANGE UP (ref 1.6–2.6)
MCHC RBC-ENTMCNC: 31 % — LOW (ref 32–36)
MCHC RBC-ENTMCNC: 32 PG — SIGNIFICANT CHANGE UP (ref 27–34)
MCV RBC AUTO: 103.1 FL — HIGH (ref 80–100)
NRBC # FLD: 0 K/UL — SIGNIFICANT CHANGE UP (ref 0–0)
PHOSPHATE SERPL-MCNC: 2.9 MG/DL — SIGNIFICANT CHANGE UP (ref 2.5–4.5)
PLATELET # BLD AUTO: 272 K/UL — SIGNIFICANT CHANGE UP (ref 150–400)
PMV BLD: 11.6 FL — SIGNIFICANT CHANGE UP (ref 7–13)
POTASSIUM SERPL-MCNC: 3.8 MMOL/L — SIGNIFICANT CHANGE UP (ref 3.5–5.3)
POTASSIUM SERPL-SCNC: 3.8 MMOL/L — SIGNIFICANT CHANGE UP (ref 3.5–5.3)
RBC # BLD: 2.91 M/UL — LOW (ref 3.8–5.2)
RBC # FLD: 13.2 % — SIGNIFICANT CHANGE UP (ref 10.3–14.5)
SODIUM SERPL-SCNC: 140 MMOL/L — SIGNIFICANT CHANGE UP (ref 135–145)
WBC # BLD: 9.08 K/UL — SIGNIFICANT CHANGE UP (ref 3.8–10.5)
WBC # FLD AUTO: 9.08 K/UL — SIGNIFICANT CHANGE UP (ref 3.8–10.5)

## 2020-02-11 PROCEDURE — 93971 EXTREMITY STUDY: CPT | Mod: 26

## 2020-02-11 PROCEDURE — 99231 SBSQ HOSP IP/OBS SF/LOW 25: CPT

## 2020-02-11 RX ADMIN — MEMANTINE HYDROCHLORIDE 10 MILLIGRAM(S): 10 TABLET ORAL at 12:52

## 2020-02-11 RX ADMIN — AMLODIPINE BESYLATE 10 MILLIGRAM(S): 2.5 TABLET ORAL at 06:12

## 2020-02-11 RX ADMIN — Medication 650 MILLIGRAM(S): at 01:50

## 2020-02-11 RX ADMIN — Medication 650 MILLIGRAM(S): at 06:12

## 2020-02-11 RX ADMIN — Medication 1 TABLET(S): at 12:52

## 2020-02-11 RX ADMIN — Medication 81 MILLIGRAM(S): at 12:51

## 2020-02-11 RX ADMIN — HEPARIN SODIUM 5000 UNIT(S): 5000 INJECTION INTRAVENOUS; SUBCUTANEOUS at 06:13

## 2020-02-11 RX ADMIN — SENNA PLUS 2 TABLET(S): 8.6 TABLET ORAL at 21:50

## 2020-02-11 RX ADMIN — POLYETHYLENE GLYCOL 3350 17 GRAM(S): 17 POWDER, FOR SOLUTION ORAL at 12:51

## 2020-02-11 RX ADMIN — Medication 12.5 MILLIGRAM(S): at 06:12

## 2020-02-11 RX ADMIN — Medication 1: at 12:51

## 2020-02-11 RX ADMIN — HEPARIN SODIUM 5000 UNIT(S): 5000 INJECTION INTRAVENOUS; SUBCUTANEOUS at 13:29

## 2020-02-11 RX ADMIN — DIVALPROEX SODIUM 125 MILLIGRAM(S): 500 TABLET, DELAYED RELEASE ORAL at 18:14

## 2020-02-11 RX ADMIN — Medication 650 MILLIGRAM(S): at 21:50

## 2020-02-11 RX ADMIN — TAMSULOSIN HYDROCHLORIDE 0.4 MILLIGRAM(S): 0.4 CAPSULE ORAL at 21:50

## 2020-02-11 RX ADMIN — Medication 12.5 MILLIGRAM(S): at 18:14

## 2020-02-11 RX ADMIN — DIVALPROEX SODIUM 125 MILLIGRAM(S): 500 TABLET, DELAYED RELEASE ORAL at 06:13

## 2020-02-11 RX ADMIN — Medication 1: at 18:14

## 2020-02-11 RX ADMIN — HEPARIN SODIUM 5000 UNIT(S): 5000 INJECTION INTRAVENOUS; SUBCUTANEOUS at 21:50

## 2020-02-11 RX ADMIN — Medication 650 MILLIGRAM(S): at 13:29

## 2020-02-11 RX ADMIN — Medication 650 MILLIGRAM(S): at 00:52

## 2020-02-11 RX ADMIN — CHLORHEXIDINE GLUCONATE 1 APPLICATION(S): 213 SOLUTION TOPICAL at 12:52

## 2020-02-11 NOTE — PROGRESS NOTE ADULT - SUBJECTIVE AND OBJECTIVE BOX
Patient is a 89y old  Female who presents with a chief complaint of MAGALIE (29 Jan 2020 14:20)    Called by RN to evaluate pt. with lump on left arm   Vital Signs Last 24 Hrs  T(C): 36.9 (11 Feb 2020 12:02), Max: 36.9 (11 Feb 2020 12:02)  T(F): 98.5 (11 Feb 2020 12:02), Max: 98.5 (11 Feb 2020 12:02)  HR: 81 (11 Feb 2020 12:02) (74 - 95)  BP: 152/75 (11 Feb 2020 12:02) (108/59 - 152/75)  BP(mean): --  RR: 16 (11 Feb 2020 12:02) (16 - 18)  SpO2: 98% (11 Feb 2020 12:02) (98% - 100%)                              9.3    9.08  )-----------( 272      ( 11 Feb 2020 05:40 )             30.0     02-11    140  |  106  |  20  ----------------------------<  143<H>  3.8   |  25  |  1.17    Ca    9.6      11 Feb 2020 05:40  Phos  2.9     02-11  Mg     2.0     02-11                                CAPILLARY BLOOD GLUCOSE      POCT Blood Glucose.: 179 mg/dL (11 Feb 2020 11:55)  POCT Blood Glucose.: 147 mg/dL (11 Feb 2020 07:40)  POCT Blood Glucose.: 144 mg/dL (10 Feb 2020 21:02)  POCT Blood Glucose.: 184 mg/dL (10 Feb 2020 16:58)    acetaminophen   Tablet .. 650 milliGRAM(s) Oral every 6 hours PRN  amLODIPine   Tablet 10 milliGRAM(s) Oral daily  aspirin enteric coated 81 milliGRAM(s) Oral daily  bisacodyl 5 milliGRAM(s) Oral daily PRN  bisacodyl Suppository 10 milliGRAM(s) Rectal once PRN  chlorhexidine 4% Liquid 1 Application(s) Topical daily  dextrose 40% Gel 15 Gram(s) Oral once PRN  dextrose 5%. 1000 milliLiter(s) IV Continuous <Continuous>  dextrose 50% Injectable 12.5 Gram(s) IV Push once  dextrose 50% Injectable 25 Gram(s) IV Push once  dextrose 50% Injectable 25 Gram(s) IV Push once  diVALproex Sprinkle 125 milliGRAM(s) Oral two times a day  glucagon  Injectable 1 milliGRAM(s) IntraMuscular once PRN  heparin  Injectable 5000 Unit(s) SubCutaneous every 8 hours  insulin lispro (HumaLOG) corrective regimen sliding scale   SubCutaneous three times a day before meals  insulin lispro (HumaLOG) corrective regimen sliding scale   SubCutaneous at bedtime  meclizine 25 milliGRAM(s) Oral two times a day PRN  memantine 10 milliGRAM(s) Oral daily  metoprolol tartrate 12.5 milliGRAM(s) Oral two times a day  multivitamin 1 Tablet(s) Oral daily  ondansetron Injectable 4 milliGRAM(s) IV Push every 6 hours PRN  polyethylene glycol 3350 17 Gram(s) Oral daily  senna 2 Tablet(s) Oral at bedtime  sodium bicarbonate 650 milliGRAM(s) Oral three times a day  sodium chloride 0.9%. 1000 milliLiter(s) IV Continuous <Continuous>  tamsulosin 0.4 milliGRAM(s) Oral at bedtime      REVIEW OF SYSTEMS:    RESPIRATORY: No cough, wheezing, chills or hemoptysis; No shortness of breath  CARDIOVASCULAR: No chest pain, palpitations, dizziness, or leg swelling  GASTROINTESTINAL: No abdominal or epigastric pain. No nausea, vomiting, or hematemesis; No diarrhea or constipation. No melena or hematochezia.  GENITOURINARY: No dysuria, frequency, hematuria, or incontinence  NEUROLOGICAL: No headaches, memory loss, loss of strength, numbness, or tremors    PHYSICAL EXAM:    GENERAL: NAD, well-groomed, well-developed  NERVOUS SYSTEM:  Alert & Oriented X3, Good concentration; Motor Strength 5/5 B/L upper and lower extremities; DTRs 2+ intact and symmetric  CHEST/LUNG: Clear to percussion bilaterally; No rales, rhonchi, wheezing, or rubs  HEART: Regular rate and rhythm; No murmurs, rubs, or gallops  ABDOMEN: Soft, Nontender, Nondistended; Bowel sounds present    RADIOLOGY :      Assessment: Patient 89y with Altered mental status  Obesity  Anemia  Dementia  OA (osteoarthritis)  Chronic low back pain  DM (diabetes mellitus)  HTN (hypertension)  Shingles  DM (diabetes mellitus)  HTN (hypertension)  S/P hysterectomy with oophorectomy  Uterine fibroid  No significant past surgical history    LEFT UPPER ARM LUMP  tender to touch.  Not warm .  Will order LUE US ---------  Warm compresses ordered  d/w medical attending Dr. Garrett

## 2020-02-11 NOTE — PROGRESS NOTE ADULT - SUBJECTIVE AND OBJECTIVE BOX
Patient is a 89y old  Female who presents with a chief complaint of MAGALIE (29 Jan 2020 14:20)    pt. seen and examined, NAD    INTERVAL HPI/OVERNIGHT EVENTS:  T(C): 36.9 (02-11-20 @ 12:02), Max: 36.9 (02-11-20 @ 12:02)  HR: 90 (02-11-20 @ 18:20) (74 - 90)  BP: 125/68 (02-11-20 @ 18:20) (113/65 - 152/75)  RR: 18 (02-11-20 @ 18:20) (16 - 18)  SpO2: 100% (02-11-20 @ 18:20) (98% - 100%)  Wt(kg): --  I&O's Summary    10 Feb 2020 07:01  -  11 Feb 2020 07:00  --------------------------------------------------------  IN: 744 mL / OUT: 1300 mL / NET: -556 mL    11 Feb 2020 07:01  -  11 Feb 2020 19:28  --------------------------------------------------------  IN: 300 mL / OUT: 500 mL / NET: -200 mL        PAST MEDICAL & SURGICAL HISTORY:  Obesity  Anemia  Dementia  OA (osteoarthritis)  Chronic low back pain  Shingles  DM (diabetes mellitus)  HTN (hypertension)  S/P hysterectomy with oophorectomy  Uterine fibroid      SOCIAL HISTORY  Alcohol:  Tobacco:  Illicit substance use:    FAMILY HISTORY:    REVIEW OF SYSTEMS:  CONSTITUTIONAL: No fever, weight loss, or fatigue  EYES: No eye pain, visual disturbances, or discharge  ENMT:  No difficulty hearing, tinnitus, vertigo; No sinus or throat pain  NECK: No pain or stiffness  RESPIRATORY: No cough, wheezing, chills or hemoptysis; No shortness of breath  CARDIOVASCULAR: No chest pain, palpitations, dizziness, or leg swelling  GASTROINTESTINAL: No abdominal or epigastric pain. No nausea, vomiting, or hematemesis; No diarrhea or constipation. No melena or hematochezia.  GENITOURINARY: No dysuria, frequency, hematuria, or incontinence  NEUROLOGICAL: No headaches, memory loss, loss of strength, numbness, or tremors  SKIN: No itching, burning, rashes, or lesions   LYMPH NODES: No enlarged glands  ENDOCRINE: No heat or cold intolerance; No hair loss  MUSCULOSKELETAL: No joint pain or swelling; No muscle, back, or extremity pain  PSYCHIATRIC: No depression, anxiety, mood swings, or difficulty sleeping  HEME/LYMPH: No easy bruising, or bleeding gums  ALLERY AND IMMUNOLOGIC: No hives or eczema    RADIOLOGY & ADDITIONAL TESTS:    Imaging Personally Reviewed:  [ ] YES  [ ] NO    Consultant(s) Notes Reviewed:  [ ] YES  [ ] NO    PHYSICAL EXAM:  GENERAL: NAD, well-groomed, well-developed  HEAD:  Atraumatic, Normocephalic  EYES: EOMI, PERRLA, conjunctiva and sclera clear  ENMT: No tonsillar erythema, exudates, or enlargement; Moist mucous membranes, Good dentition, No lesions  NECK: Supple, No JVD, Normal thyroid  NERVOUS SYSTEM:  Alert & Oriented X3, Good concentration; Motor Strength 5/5 B/L upper and lower extremities; DTRs 2+ intact and symmetric  CHEST/LUNG: Clear to percussion bilaterally; No rales, rhonchi, wheezing, or rubs  HEART: Regular rate and rhythm; No murmurs, rubs, or gallops  ABDOMEN: Soft, Nontender, Nondistended; Bowel sounds present  EXTREMITIES:  2+ Peripheral Pulses, No clubbing, cyanosis, or edema  LYMPH: No lymphadenopathy noted  SKIN: No rashes or lesions    LABS:                        9.3    9.08  )-----------( 272      ( 11 Feb 2020 05:40 )             30.0     02-11    140  |  106  |  20  ----------------------------<  143<H>  3.8   |  25  |  1.17    Ca    9.6      11 Feb 2020 05:40  Phos  2.9     02-11  Mg     2.0     02-11          CAPILLARY BLOOD GLUCOSE      POCT Blood Glucose.: 190 mg/dL (11 Feb 2020 18:13)  POCT Blood Glucose.: 212 mg/dL (11 Feb 2020 16:55)  POCT Blood Glucose.: 179 mg/dL (11 Feb 2020 11:55)  POCT Blood Glucose.: 147 mg/dL (11 Feb 2020 07:40)  POCT Blood Glucose.: 144 mg/dL (10 Feb 2020 21:02)            MEDICATIONS  (STANDING):  amLODIPine   Tablet 10 milliGRAM(s) Oral daily  aspirin enteric coated 81 milliGRAM(s) Oral daily  chlorhexidine 4% Liquid 1 Application(s) Topical daily  dextrose 5%. 1000 milliLiter(s) (50 mL/Hr) IV Continuous <Continuous>  dextrose 50% Injectable 12.5 Gram(s) IV Push once  dextrose 50% Injectable 25 Gram(s) IV Push once  dextrose 50% Injectable 25 Gram(s) IV Push once  diVALproex Sprinkle 125 milliGRAM(s) Oral two times a day  heparin  Injectable 5000 Unit(s) SubCutaneous every 8 hours  insulin lispro (HumaLOG) corrective regimen sliding scale   SubCutaneous three times a day before meals  insulin lispro (HumaLOG) corrective regimen sliding scale   SubCutaneous at bedtime  memantine 10 milliGRAM(s) Oral daily  metoprolol tartrate 12.5 milliGRAM(s) Oral two times a day  multivitamin 1 Tablet(s) Oral daily  polyethylene glycol 3350 17 Gram(s) Oral daily  senna 2 Tablet(s) Oral at bedtime  sodium bicarbonate 650 milliGRAM(s) Oral three times a day  sodium chloride 0.9%. 1000 milliLiter(s) (60 mL/Hr) IV Continuous <Continuous>  tamsulosin 0.4 milliGRAM(s) Oral at bedtime    MEDICATIONS  (PRN):  acetaminophen   Tablet .. 650 milliGRAM(s) Oral every 6 hours PRN Temp greater or equal to 38C (100.4F), Mild Pain (1 - 3), Moderate Pain (4 - 6), Severe Pain (7 - 10)  bisacodyl 5 milliGRAM(s) Oral daily PRN Constipation  bisacodyl Suppository 10 milliGRAM(s) Rectal once PRN Constipation  dextrose 40% Gel 15 Gram(s) Oral once PRN Blood Glucose LESS THAN 70 milliGRAM(s)/deciliter  glucagon  Injectable 1 milliGRAM(s) IntraMuscular once PRN Glucose LESS THAN 70 milligrams/deciliter  meclizine 25 milliGRAM(s) Oral two times a day PRN Dizziness  ondansetron Injectable 4 milliGRAM(s) IV Push every 6 hours PRN Nausea      Care Discussed with Consultants/Other Providers [ ] YES  [ ] NO

## 2020-02-12 LAB
ANION GAP SERPL CALC-SCNC: 12 MMO/L — SIGNIFICANT CHANGE UP (ref 7–14)
BUN SERPL-MCNC: 18 MG/DL — SIGNIFICANT CHANGE UP (ref 7–23)
CALCIUM SERPL-MCNC: 9.7 MG/DL — SIGNIFICANT CHANGE UP (ref 8.4–10.5)
CHLORIDE SERPL-SCNC: 103 MMOL/L — SIGNIFICANT CHANGE UP (ref 98–107)
CO2 SERPL-SCNC: 23 MMOL/L — SIGNIFICANT CHANGE UP (ref 22–31)
CREAT SERPL-MCNC: 1.21 MG/DL — SIGNIFICANT CHANGE UP (ref 0.5–1.3)
GLUCOSE SERPL-MCNC: 155 MG/DL — HIGH (ref 70–99)
HCT VFR BLD CALC: 31.8 % — LOW (ref 34.5–45)
HGB BLD-MCNC: 10.1 G/DL — LOW (ref 11.5–15.5)
MAGNESIUM SERPL-MCNC: 2.1 MG/DL — SIGNIFICANT CHANGE UP (ref 1.6–2.6)
MCHC RBC-ENTMCNC: 31.8 % — LOW (ref 32–36)
MCHC RBC-ENTMCNC: 32.4 PG — SIGNIFICANT CHANGE UP (ref 27–34)
MCV RBC AUTO: 101.9 FL — HIGH (ref 80–100)
NRBC # FLD: 0 K/UL — SIGNIFICANT CHANGE UP (ref 0–0)
PHOSPHATE SERPL-MCNC: 2.9 MG/DL — SIGNIFICANT CHANGE UP (ref 2.5–4.5)
PLATELET # BLD AUTO: 268 K/UL — SIGNIFICANT CHANGE UP (ref 150–400)
PMV BLD: 11 FL — SIGNIFICANT CHANGE UP (ref 7–13)
POTASSIUM SERPL-MCNC: 3.7 MMOL/L — SIGNIFICANT CHANGE UP (ref 3.5–5.3)
POTASSIUM SERPL-SCNC: 3.7 MMOL/L — SIGNIFICANT CHANGE UP (ref 3.5–5.3)
RBC # BLD: 3.12 M/UL — LOW (ref 3.8–5.2)
RBC # FLD: 13.3 % — SIGNIFICANT CHANGE UP (ref 10.3–14.5)
SODIUM SERPL-SCNC: 138 MMOL/L — SIGNIFICANT CHANGE UP (ref 135–145)
WBC # BLD: 12.94 K/UL — HIGH (ref 3.8–10.5)
WBC # FLD AUTO: 12.94 K/UL — HIGH (ref 3.8–10.5)

## 2020-02-12 RX ADMIN — Medication 12.5 MILLIGRAM(S): at 05:48

## 2020-02-12 RX ADMIN — HEPARIN SODIUM 5000 UNIT(S): 5000 INJECTION INTRAVENOUS; SUBCUTANEOUS at 22:05

## 2020-02-12 RX ADMIN — Medication 650 MILLIGRAM(S): at 05:48

## 2020-02-12 RX ADMIN — DIVALPROEX SODIUM 125 MILLIGRAM(S): 500 TABLET, DELAYED RELEASE ORAL at 05:48

## 2020-02-12 RX ADMIN — Medication 650 MILLIGRAM(S): at 12:40

## 2020-02-12 RX ADMIN — Medication 1 TABLET(S): at 17:42

## 2020-02-12 RX ADMIN — DIVALPROEX SODIUM 125 MILLIGRAM(S): 500 TABLET, DELAYED RELEASE ORAL at 17:42

## 2020-02-12 RX ADMIN — Medication 1: at 08:31

## 2020-02-12 RX ADMIN — AMLODIPINE BESYLATE 10 MILLIGRAM(S): 2.5 TABLET ORAL at 05:48

## 2020-02-12 RX ADMIN — POLYETHYLENE GLYCOL 3350 17 GRAM(S): 17 POWDER, FOR SOLUTION ORAL at 17:43

## 2020-02-12 RX ADMIN — SENNA PLUS 2 TABLET(S): 8.6 TABLET ORAL at 22:05

## 2020-02-12 RX ADMIN — Medication 1: at 11:58

## 2020-02-12 RX ADMIN — TAMSULOSIN HYDROCHLORIDE 0.4 MILLIGRAM(S): 0.4 CAPSULE ORAL at 22:05

## 2020-02-12 RX ADMIN — Medication 12.5 MILLIGRAM(S): at 17:43

## 2020-02-12 RX ADMIN — Medication 650 MILLIGRAM(S): at 22:05

## 2020-02-12 RX ADMIN — CHLORHEXIDINE GLUCONATE 1 APPLICATION(S): 213 SOLUTION TOPICAL at 17:43

## 2020-02-12 RX ADMIN — MEMANTINE HYDROCHLORIDE 10 MILLIGRAM(S): 10 TABLET ORAL at 17:42

## 2020-02-12 RX ADMIN — Medication 650 MILLIGRAM(S): at 11:58

## 2020-02-12 RX ADMIN — Medication 81 MILLIGRAM(S): at 17:42

## 2020-02-12 NOTE — PROGRESS NOTE ADULT - PROBLEM SELECTOR PROBLEM 6
UTI (urinary tract infection)
Prophylactic measure
UTI (urinary tract infection)
Prophylactic measure

## 2020-02-12 NOTE — PROGRESS NOTE ADULT - ATTENDING COMMENTS
Dr Edmund Borrego  Nephrology  Office [phone]  Ans Serv [phone]  [facility] [phone]
For any question, call:  Cell # [phone]  Pager # [phone]  Callback # [phone]
[facility]  Pager: [phone]  Cell: [phone]
Dr Edmund Borrego  Nephrology  Office [phone]  Ans Serv [phone]  [facility] [phone]
[facility]  Pager: [phone]  Cell: [phone]
[facility]  Pager: [phone]  Cell: [phone]
Thong Hidalgo MD   [facility]  Pager: [phone]  Cell: [phone]
Thong Hidalgo MD   [facility]  Pager: [phone]  Cell: [phone]
[facility]  Pager: [phone]  Cell: [phone]
seen by palliative care team , remain full code for now   d/c planning . ? placement
d/c pallning
pt. has adv dementia and prognosis is guarded , need palliative care consult for GOC
pt.has adv dementia and her mental status is likely 2/2 worsening dementia , palliative care consult for [facility]
seen by palliative care team , remain full code for now   PT eval , may need STR
seen by palliative care team , remain full code for now   d/c planning . ? placement
seen by palliative care team , remain full code for now   d/c planning . ? placement
unable to reach family , palliative care team signed off as family is not showing up for meeting for GOC .   pt. is medically stable to be d/c
unable to reach family , palliative care team signed off as family is not showing up for meeting for GOC .   pt. is medically stable to be d/c
pt.has adv dementia and her mental status is likely 2/2 worsening dementia , palliative care consult for [facility]
f/u Gyn recommendation
ok to be d/c home or LTC
pt. is medically stable to be d/c , unable to reach son despite several people placing call
pt. is medically stable to be d/c , unable to reach son despite several people placing call
pt. is medically stable to be d/c. need to be d/c with murcia and f/u with urology as out pt.
son coming tomorrow regardig d/c planning home . pt. medicaly stable to be d/c

## 2020-02-12 NOTE — PROGRESS NOTE ADULT - SUBJECTIVE AND OBJECTIVE BOX
[facility]      Seen earlier today, and examined.  - Today, patient is without complaints.           *****MEDICATIONS: Current medication reviewed and documented.    MEDICATIONS  (STANDING):  amLODIPine   Tablet 10 milliGRAM(s) Oral daily  aspirin enteric coated 81 milliGRAM(s) Oral daily  chlorhexidine 4% Liquid 1 Application(s) Topical daily  dextrose 5%. 1000 milliLiter(s) (50 mL/Hr) IV Continuous <Continuous>  dextrose 50% Injectable 12.5 Gram(s) IV Push once  dextrose 50% Injectable 25 Gram(s) IV Push once  dextrose 50% Injectable 25 Gram(s) IV Push once  diVALproex Sprinkle 125 milliGRAM(s) Oral two times a day  heparin  Injectable 5000 Unit(s) SubCutaneous every 8 hours  insulin lispro (HumaLOG) corrective regimen sliding scale   SubCutaneous three times a day before meals  insulin lispro (HumaLOG) corrective regimen sliding scale   SubCutaneous at bedtime  memantine 10 milliGRAM(s) Oral daily  metoprolol tartrate 12.5 milliGRAM(s) Oral two times a day  multivitamin 1 Tablet(s) Oral daily  polyethylene glycol 3350 17 Gram(s) Oral daily  senna 2 Tablet(s) Oral at bedtime  sodium bicarbonate 650 milliGRAM(s) Oral three times a day  sodium chloride 0.9%. 1000 milliLiter(s) (60 mL/Hr) IV Continuous <Continuous>  tamsulosin 0.4 milliGRAM(s) Oral at bedtime    MEDICATIONS  (PRN):  acetaminophen   Tablet .. 650 milliGRAM(s) Oral every 6 hours PRN Temp greater or equal to 38C (100.4F), Mild Pain (1 - 3), Moderate Pain (4 - 6), Severe Pain (7 - 10)  bisacodyl 5 milliGRAM(s) Oral daily PRN Constipation  bisacodyl Suppository 10 milliGRAM(s) Rectal once PRN Constipation  dextrose 40% Gel 15 Gram(s) Oral once PRN Blood Glucose LESS THAN 70 milliGRAM(s)/deciliter  glucagon  Injectable 1 milliGRAM(s) IntraMuscular once PRN Glucose LESS THAN 70 milligrams/deciliter  meclizine 25 milliGRAM(s) Oral two times a day PRN Dizziness  ondansetron Injectable 4 milliGRAM(s) IV Push every 6 hours PRN Nausea          ***** VITAL SIGNS:  T(F): 97.7 (02-13-20 @ 05:17), Max: 98.2 (02-12-20 @ 17:40)  HR: 86 (02-13-20 @ 05:17) (75 - 86)  BP: 131/72 (02-13-20 @ 05:17) (101/57 - 141/75)  RR: 16 (02-13-20 @ 05:17) (16 - 18)  SpO2: 98% (02-13-20 @ 05:17) (94% - 100%)  Wt(kg): --  ,   I&O's Summary    12 Feb 2020 07:01  -  13 Feb 2020 07:00  --------------------------------------------------------  IN: 1048 mL / OUT: 1050 mL / NET: -2 mL             *****PHYSICAL EXAM:   alert able to say   following some simple commmands intermittently   limited exam as pt is not fully cooperative   EOmi fundi not visualized blinks to threat   no nystagmus VFF to confrontation  Tongue is midline  Palate elevates symmetrically   Moving both upper ext   refusing to move le   Gait not assessed.          *****LAB AND IMAGING:                        10.0   11.88 )-----------( 264      ( 13 Feb 2020 06:00 )             31.2               02-13    139  |  103  |  25<H>  ----------------------------<  134<H>  3.9   |  23  |  1.32<H>    Ca    9.5      13 Feb 2020 06:00  Phos  3.4     02-13  Mg     2.1     02-13                           [All pertinent recent Imaging/Reports reviewed]           *****A S S E S S M E N T   A N D   P L A N :       Excerpt from H&P  89 Creole speaking female, h/o dementia, DM2, HTN,  presents from aide for evaluation of AMS. Patient is a poor historian, AAOx2 at baseline and hard of hearing. Unable to obtain history. Patient denies symptoms. She reports that she was on a train [place] and is unable to recall how she ended up at the hospital. Unable to reach family members for collateral information.   Currently, only has mild back pain, denies any CP or SOB.         limited exam as pt is not cooperative.  Problem/Recommendations 1 waxing and waning mental status.      depakote bid for beh management     Problem/Recommendations 2: lethargy improved.   avoid any sedation   ct head unremarkable   eeg did not reveal any sz         Thank you for allowing me to participate in the care of this patient. Please do not hesitate to call me if you have any  questions.        ________________  Stormy Chavira MD  [facility] ([facility]  [phone]      33 minutes spent on total encounter; more than 50 % of the visit was  spent counseling about plan of care, compliance to diet/exercise and medication regimen and or  coordinating care by the attending physician.      It is advised that stroke patients follow up with DELILAH Callahan @ [phone] in 1- 2 weeks.   Others please follow up with Dr. Michael Nissenbaum [phone]

## 2020-02-12 NOTE — PROGRESS NOTE ADULT - PROBLEM SELECTOR PROBLEM 4
Type 2 diabetes mellitus with other specified complication, without long-term current use of insulin

## 2020-02-12 NOTE — PROGRESS NOTE ADULT - PROBLEM SELECTOR PLAN 5
BP elevated.  - Cont. home meds
today tachycardiac , no SOB   started on lopressor , if BP gets low , then d/c amlodipine and c/w lopressor
today tachycardiac , no SOB   started on lopressor , if BP gets low , then d/c amlodipine and c/w lopressor
BP elevated.  - Cont. home meds

## 2020-02-12 NOTE — PROGRESS NOTE ADULT - SUBJECTIVE AND OBJECTIVE BOX
Patient is a 89y old  Female who presents with a chief complaint of MAGALIE (29 Jan 2020 14:20)    pt. seen and examined, NAD    INTERVAL HPI/OVERNIGHT EVENTS:  T(C): 36.8 (02-12-20 @ 17:40), Max: 36.8 (02-11-20 @ 20:00)  HR: 78 (02-12-20 @ 17:40) (70 - 82)  BP: 110/63 (02-12-20 @ 17:40) (101/57 - 156/74)  RR: 16 (02-12-20 @ 17:40) (16 - 18)  SpO2: 96% (02-12-20 @ 17:40) (94% - 100%)  Wt(kg): --  I&O's Summary    11 Feb 2020 07:01  -  12 Feb 2020 07:00  --------------------------------------------------------  IN: 550 mL / OUT: 1500 mL / NET: -950 mL    12 Feb 2020 07:01  -  12 Feb 2020 18:48  --------------------------------------------------------  IN: 598 mL / OUT: 575 mL / NET: 23 mL        PAST MEDICAL & SURGICAL HISTORY:  Obesity  Anemia  Dementia  OA (osteoarthritis)  Chronic low back pain  Shingles  DM (diabetes mellitus)  HTN (hypertension)  S/P hysterectomy with oophorectomy  Uterine fibroid      SOCIAL HISTORY  Alcohol:  Tobacco:  Illicit substance use:    FAMILY HISTORY:    REVIEW OF SYSTEMS:  CONSTITUTIONAL: No fever, weight loss, or fatigue  EYES: No eye pain, visual disturbances, or discharge  ENMT:  No difficulty hearing, tinnitus, vertigo; No sinus or throat pain  NECK: No pain or stiffness  RESPIRATORY: No cough, wheezing, chills or hemoptysis; No shortness of breath  CARDIOVASCULAR: No chest pain, palpitations, dizziness, or leg swelling  GASTROINTESTINAL: No abdominal or epigastric pain. No nausea, vomiting, or hematemesis; No diarrhea or constipation. No melena or hematochezia.  GENITOURINARY: No dysuria, frequency, hematuria, or incontinence  NEUROLOGICAL: No headaches, memory loss, loss of strength, numbness, or tremors  SKIN: No itching, burning, rashes, or lesions   LYMPH NODES: No enlarged glands  ENDOCRINE: No heat or cold intolerance; No hair loss  MUSCULOSKELETAL: No joint pain or swelling; No muscle, back, or extremity pain  PSYCHIATRIC: No depression, anxiety, mood swings, or difficulty sleeping  HEME/LYMPH: No easy bruising, or bleeding gums  ALLERY AND IMMUNOLOGIC: No hives or eczema    RADIOLOGY & ADDITIONAL TESTS:    Imaging Personally Reviewed:  [ ] YES  [ ] NO    Consultant(s) Notes Reviewed:  [ ] YES  [ ] NO    PHYSICAL EXAM:  GENERAL: NAD, well-groomed, well-developed  HEAD:  Atraumatic, Normocephalic  EYES: EOMI, PERRLA, conjunctiva and sclera clear  ENMT: No tonsillar erythema, exudates, or enlargement; Moist mucous membranes, Good dentition, No lesions  NECK: Supple, No JVD, Normal thyroid  NERVOUS SYSTEM:  Alert & Oriented X3, Good concentration; Motor Strength 5/5 B/L upper and lower extremities; DTRs 2+ intact and symmetric  CHEST/LUNG: Clear to percussion bilaterally; No rales, rhonchi, wheezing, or rubs  HEART: Regular rate and rhythm; No murmurs, rubs, or gallops  ABDOMEN: Soft, Nontender, Nondistended; Bowel sounds present  EXTREMITIES:  2+ Peripheral Pulses, No clubbing, cyanosis, or edema  LYMPH: No lymphadenopathy noted  SKIN: No rashes or lesions    LABS:                        10.1   12.94 )-----------( 268      ( 12 Feb 2020 03:50 )             31.8     02-12    138  |  103  |  18  ----------------------------<  155<H>  3.7   |  23  |  1.21    Ca    9.7      12 Feb 2020 03:50  Phos  2.9     02-12  Mg     2.1     02-12          CAPILLARY BLOOD GLUCOSE      POCT Blood Glucose.: 114 mg/dL (12 Feb 2020 16:49)  POCT Blood Glucose.: 177 mg/dL (12 Feb 2020 11:48)  POCT Blood Glucose.: 157 mg/dL (12 Feb 2020 07:47)  POCT Blood Glucose.: 216 mg/dL (11 Feb 2020 21:46)            MEDICATIONS  (STANDING):  amLODIPine   Tablet 10 milliGRAM(s) Oral daily  aspirin enteric coated 81 milliGRAM(s) Oral daily  chlorhexidine 4% Liquid 1 Application(s) Topical daily  dextrose 5%. 1000 milliLiter(s) (50 mL/Hr) IV Continuous <Continuous>  dextrose 50% Injectable 12.5 Gram(s) IV Push once  dextrose 50% Injectable 25 Gram(s) IV Push once  dextrose 50% Injectable 25 Gram(s) IV Push once  diVALproex Sprinkle 125 milliGRAM(s) Oral two times a day  heparin  Injectable 5000 Unit(s) SubCutaneous every 8 hours  insulin lispro (HumaLOG) corrective regimen sliding scale   SubCutaneous three times a day before meals  insulin lispro (HumaLOG) corrective regimen sliding scale   SubCutaneous at bedtime  memantine 10 milliGRAM(s) Oral daily  metoprolol tartrate 12.5 milliGRAM(s) Oral two times a day  multivitamin 1 Tablet(s) Oral daily  polyethylene glycol 3350 17 Gram(s) Oral daily  senna 2 Tablet(s) Oral at bedtime  sodium bicarbonate 650 milliGRAM(s) Oral three times a day  sodium chloride 0.9%. 1000 milliLiter(s) (60 mL/Hr) IV Continuous <Continuous>  tamsulosin 0.4 milliGRAM(s) Oral at bedtime    MEDICATIONS  (PRN):  acetaminophen   Tablet .. 650 milliGRAM(s) Oral every 6 hours PRN Temp greater or equal to 38C (100.4F), Mild Pain (1 - 3), Moderate Pain (4 - 6), Severe Pain (7 - 10)  bisacodyl 5 milliGRAM(s) Oral daily PRN Constipation  bisacodyl Suppository 10 milliGRAM(s) Rectal once PRN Constipation  dextrose 40% Gel 15 Gram(s) Oral once PRN Blood Glucose LESS THAN 70 milliGRAM(s)/deciliter  glucagon  Injectable 1 milliGRAM(s) IntraMuscular once PRN Glucose LESS THAN 70 milligrams/deciliter  meclizine 25 milliGRAM(s) Oral two times a day PRN Dizziness  ondansetron Injectable 4 milliGRAM(s) IV Push every 6 hours PRN Nausea      Care Discussed with Consultants/Other Providers [ ] YES  [ ] NO

## 2020-02-12 NOTE — PROGRESS NOTE ADULT - PROBLEM SELECTOR PLAN 6
on rocephine completed
SQ heparin
on rocephine
on rocephine
on rocephine completed
on rocephine completed  Bacterial vaginosis : started on flagyl
on rocephine completed  Bacterial vaginosis : started on flagyl
on rocephine completed  Bacterial vaginosis : started on flagyl  Gyn consult   abd CT scan
SQ heparin

## 2020-02-12 NOTE — PROGRESS NOTE ADULT - PROBLEM SELECTOR PLAN 2
Bp slightly High. on Amlodipine. watch
c/w Amlodipine. bp well controlled
c/w Amlodipine. watch
c/w Amlodipine. watch
good control with present regimen
good control with present regimen
iv fluids  -renal following   -monitor renal fx
resolved
c/w Amlodipine. watch
iv fluids  -renal following   -monitor renal fx
iv fluids  -renal following   -monitor renal fx

## 2020-02-12 NOTE — PROGRESS NOTE ADULT - PROBLEM SELECTOR PLAN 3
supportive care

## 2020-02-12 NOTE — PROGRESS NOTE ADULT - PROBLEM SELECTOR PROBLEM 2
Essential hypertension
MAGALIE (acute kidney injury)
Essential hypertension
MAGALIE (acute kidney injury)
MAGALIE (acute kidney injury)

## 2020-02-12 NOTE — PROGRESS NOTE ADULT - PROBLEM SELECTOR PROBLEM 5
Essential hypertension

## 2020-02-12 NOTE — PROGRESS NOTE ADULT - PROBLEM SELECTOR PROBLEM 1
Altered mental status
MAGALIE (acute kidney injury)
Altered mental status
MAGALIE (acute kidney injury)
Altered mental status

## 2020-02-12 NOTE — PROGRESS NOTE ADULT - PROBLEM SELECTOR PLAN 4
-Check A1c  -FSSS with Humalog coverge

## 2020-02-12 NOTE — PROGRESS NOTE ADULT - PROBLEM SELECTOR PROBLEM 3
Dementia

## 2020-02-13 ENCOUNTER — TRANSCRIPTION ENCOUNTER (OUTPATIENT)
Age: 85
End: 2020-02-13

## 2020-02-13 VITALS
HEART RATE: 94 BPM | TEMPERATURE: 99 F | DIASTOLIC BLOOD PRESSURE: 66 MMHG | SYSTOLIC BLOOD PRESSURE: 130 MMHG | OXYGEN SATURATION: 100 % | RESPIRATION RATE: 18 BRPM

## 2020-02-13 LAB
ANION GAP SERPL CALC-SCNC: 13 MMO/L — SIGNIFICANT CHANGE UP (ref 7–14)
BUN SERPL-MCNC: 25 MG/DL — HIGH (ref 7–23)
CALCIUM SERPL-MCNC: 9.5 MG/DL — SIGNIFICANT CHANGE UP (ref 8.4–10.5)
CHLORIDE SERPL-SCNC: 103 MMOL/L — SIGNIFICANT CHANGE UP (ref 98–107)
CO2 SERPL-SCNC: 23 MMOL/L — SIGNIFICANT CHANGE UP (ref 22–31)
CREAT SERPL-MCNC: 1.32 MG/DL — HIGH (ref 0.5–1.3)
GLUCOSE SERPL-MCNC: 134 MG/DL — HIGH (ref 70–99)
HCT VFR BLD CALC: 31.2 % — LOW (ref 34.5–45)
HGB BLD-MCNC: 10 G/DL — LOW (ref 11.5–15.5)
MAGNESIUM SERPL-MCNC: 2.1 MG/DL — SIGNIFICANT CHANGE UP (ref 1.6–2.6)
MCHC RBC-ENTMCNC: 32.1 % — SIGNIFICANT CHANGE UP (ref 32–36)
MCHC RBC-ENTMCNC: 32.5 PG — SIGNIFICANT CHANGE UP (ref 27–34)
MCV RBC AUTO: 101.3 FL — HIGH (ref 80–100)
NRBC # FLD: 0 K/UL — SIGNIFICANT CHANGE UP (ref 0–0)
PHOSPHATE SERPL-MCNC: 3.4 MG/DL — SIGNIFICANT CHANGE UP (ref 2.5–4.5)
PLATELET # BLD AUTO: 264 K/UL — SIGNIFICANT CHANGE UP (ref 150–400)
PMV BLD: 11.3 FL — SIGNIFICANT CHANGE UP (ref 7–13)
POTASSIUM SERPL-MCNC: 3.9 MMOL/L — SIGNIFICANT CHANGE UP (ref 3.5–5.3)
POTASSIUM SERPL-SCNC: 3.9 MMOL/L — SIGNIFICANT CHANGE UP (ref 3.5–5.3)
RBC # BLD: 3.08 M/UL — LOW (ref 3.8–5.2)
RBC # FLD: 13.3 % — SIGNIFICANT CHANGE UP (ref 10.3–14.5)
SODIUM SERPL-SCNC: 139 MMOL/L — SIGNIFICANT CHANGE UP (ref 135–145)
WBC # BLD: 11.88 K/UL — HIGH (ref 3.8–10.5)
WBC # FLD AUTO: 11.88 K/UL — HIGH (ref 3.8–10.5)

## 2020-02-13 RX ORDER — METOPROLOL TARTRATE 50 MG
0.5 TABLET ORAL
Qty: 0 | Refills: 0 | DISCHARGE

## 2020-02-13 RX ORDER — METFORMIN HYDROCHLORIDE 850 MG/1
1 TABLET ORAL
Qty: 0 | Refills: 0 | DISCHARGE

## 2020-02-13 RX ORDER — TAMSULOSIN HYDROCHLORIDE 0.4 MG/1
1 CAPSULE ORAL
Qty: 30 | Refills: 0
Start: 2020-02-13 | End: 2020-03-13

## 2020-02-13 RX ORDER — SODIUM BICARBONATE 1 MEQ/ML
1 SYRINGE (ML) INTRAVENOUS
Qty: 0 | Refills: 0 | DISCHARGE
Start: 2020-02-13

## 2020-02-13 RX ORDER — DIVALPROEX SODIUM 500 MG/1
1 TABLET, DELAYED RELEASE ORAL
Qty: 0 | Refills: 0 | DISCHARGE
Start: 2020-02-13

## 2020-02-13 RX ORDER — INSULIN LISPRO 100/ML
1 VIAL (ML) SUBCUTANEOUS
Qty: 0 | Refills: 0 | DISCHARGE

## 2020-02-13 RX ORDER — ACETAMINOPHEN 500 MG
2 TABLET ORAL
Qty: 0 | Refills: 0 | DISCHARGE
Start: 2020-02-13

## 2020-02-13 RX ORDER — SENNA PLUS 8.6 MG/1
2 TABLET ORAL
Qty: 0 | Refills: 0 | DISCHARGE
Start: 2020-02-13

## 2020-02-13 RX ADMIN — CHLORHEXIDINE GLUCONATE 1 APPLICATION(S): 213 SOLUTION TOPICAL at 13:05

## 2020-02-13 RX ADMIN — Medication 81 MILLIGRAM(S): at 13:06

## 2020-02-13 RX ADMIN — AMLODIPINE BESYLATE 10 MILLIGRAM(S): 2.5 TABLET ORAL at 05:21

## 2020-02-13 RX ADMIN — MEMANTINE HYDROCHLORIDE 10 MILLIGRAM(S): 10 TABLET ORAL at 13:06

## 2020-02-13 RX ADMIN — POLYETHYLENE GLYCOL 3350 17 GRAM(S): 17 POWDER, FOR SOLUTION ORAL at 13:06

## 2020-02-13 RX ADMIN — HEPARIN SODIUM 5000 UNIT(S): 5000 INJECTION INTRAVENOUS; SUBCUTANEOUS at 05:21

## 2020-02-13 RX ADMIN — Medication 650 MILLIGRAM(S): at 05:21

## 2020-02-13 RX ADMIN — Medication 1 TABLET(S): at 13:06

## 2020-02-13 RX ADMIN — DIVALPROEX SODIUM 125 MILLIGRAM(S): 500 TABLET, DELAYED RELEASE ORAL at 05:21

## 2020-02-13 RX ADMIN — HEPARIN SODIUM 5000 UNIT(S): 5000 INJECTION INTRAVENOUS; SUBCUTANEOUS at 13:06

## 2020-02-13 RX ADMIN — Medication 12.5 MILLIGRAM(S): at 05:21

## 2020-02-13 RX ADMIN — Medication 650 MILLIGRAM(S): at 13:06

## 2020-02-13 NOTE — DISCHARGE NOTE NURSING/CASE MANAGEMENT/SOCIAL WORK - PATIENT PORTAL LINK FT
You can access the FollowMyHealth Patient Portal offered by [facility] by registering at the following website: [email or web address]. By joining [facility]’s FollowMyHealth portal, you will also be able to view your health information using other applications (apps) compatible with our system.

## 2020-02-13 NOTE — PROGRESS NOTE ADULT - SUBJECTIVE AND OBJECTIVE BOX
[facility]      Seen earlier today, and examined.  - Today, patient is without complaints.           *****MEDICATIONS: Current medication reviewed and documented.    MEDICATIONS  (STANDING):  amLODIPine   Tablet 10 milliGRAM(s) Oral daily  aspirin enteric coated 81 milliGRAM(s) Oral daily  chlorhexidine 4% Liquid 1 Application(s) Topical daily  dextrose 5%. 1000 milliLiter(s) (50 mL/Hr) IV Continuous <Continuous>  dextrose 50% Injectable 12.5 Gram(s) IV Push once  dextrose 50% Injectable 25 Gram(s) IV Push once  dextrose 50% Injectable 25 Gram(s) IV Push once  diVALproex Sprinkle 125 milliGRAM(s) Oral two times a day  heparin  Injectable 5000 Unit(s) SubCutaneous every 8 hours  insulin lispro (HumaLOG) corrective regimen sliding scale   SubCutaneous three times a day before meals  insulin lispro (HumaLOG) corrective regimen sliding scale   SubCutaneous at bedtime  memantine 10 milliGRAM(s) Oral daily  metoprolol tartrate 12.5 milliGRAM(s) Oral two times a day  multivitamin 1 Tablet(s) Oral daily  polyethylene glycol 3350 17 Gram(s) Oral daily  senna 2 Tablet(s) Oral at bedtime  sodium bicarbonate 650 milliGRAM(s) Oral three times a day  sodium chloride 0.9%. 1000 milliLiter(s) (60 mL/Hr) IV Continuous <Continuous>  tamsulosin 0.4 milliGRAM(s) Oral at bedtime    MEDICATIONS  (PRN):  acetaminophen   Tablet .. 650 milliGRAM(s) Oral every 6 hours PRN Temp greater or equal to 38C (100.4F), Mild Pain (1 - 3), Moderate Pain (4 - 6), Severe Pain (7 - 10)  bisacodyl 5 milliGRAM(s) Oral daily PRN Constipation  bisacodyl Suppository 10 milliGRAM(s) Rectal once PRN Constipation  dextrose 40% Gel 15 Gram(s) Oral once PRN Blood Glucose LESS THAN 70 milliGRAM(s)/deciliter  glucagon  Injectable 1 milliGRAM(s) IntraMuscular once PRN Glucose LESS THAN 70 milligrams/deciliter  meclizine 25 milliGRAM(s) Oral two times a day PRN Dizziness  ondansetron Injectable 4 milliGRAM(s) IV Push every 6 hours PRN Nausea          ***** VITAL SIGNS:  T(F): 98.1 (02-13-20 @ 12:15), Max: 98.2 (02-12-20 @ 17:40)  HR: 82 (02-13-20 @ 12:15) (76 - 86)  BP: 122/74 (02-13-20 @ 12:15) (110/63 - 141/75)  RR: 16 (02-13-20 @ 12:15) (16 - 16)  SpO2: 98% (02-13-20 @ 12:15) (96% - 100%)  Wt(kg): --  ,   I&O's Summary    12 Feb 2020 07:01  -  13 Feb 2020 07:00  --------------------------------------------------------  IN: 1048 mL / OUT: 1050 mL / NET: -2 mL             *****PHYSICAL EXAM:   alert able to say   following some simple commmands intermittently   limited exam as pt is not fully cooperative   EOmi fundi not visualized blinks to threat   no nystagmus VFF to confrontation  Tongue is midline  Palate elevates symmetrically   Moving both upper ext   refusing to move le   Gait not assessed.             *****LAB AND IMAGING:                        10.0   11.88 )-----------( 264      ( 13 Feb 2020 06:00 )             31.2               02-13    139  |  103  |  25<H>  ----------------------------<  134<H>  3.9   |  23  |  1.32<H>    Ca    9.5      13 Feb 2020 06:00  Phos  3.4     02-13  Mg     2.1     02-13                           [All pertinent recent Imaging/Reports reviewed]           *****A S S E S S M E N T   A N D   P L A N :       Excerpt from H&P  89 Creole speaking female, h/o dementia, DM2, HTN,  presents from aide for evaluation of AMS. Patient is a poor historian, AAOx2 at baseline and hard of hearing. Unable to obtain history. Patient denies symptoms. She reports that she was on a train [place] and is unable to recall how she ended up at the hospital. Unable to reach family members for collateral information.   Currently, only has mild back pain, denies any CP or SOB.         limited exam as pt is not cooperative.  Problem/Recommendations 1 waxing and waning mental status.      depakote bid for beh management     Problem/Recommendations 2: lethargy improved.   avoid any sedation   ct head unremarkable   eeg did not reveal any sz       Thank you for allowing me to participate in the care of this patient. Please do not hesitate to call me if you have any  questions.        ________________  Stormy Chavira MD  [facility] ([facility]  [phone]      33 minutes spent on total encounter; more than 50 % of the visit was  spent counseling about plan of care, compliance to diet/exercise and medication regimen and or  coordinating care by the attending physician.      It is advised that stroke patients follow up with DELILAH Callahan @ [phone] in 1- 2 weeks.   Others please follow up with Dr. Michael Nissenbaum [phone]

## 2020-02-13 NOTE — DISCHARGE NOTE NURSING/CASE MANAGEMENT/SOCIAL WORK - NSDCCRNAME_GEN_ALL_CORE_FT
[facility] address: [facility] [address], [place] [address] , 3pm  via [facility]

## 2020-02-13 NOTE — DISCHARGE NOTE NURSING/CASE MANAGEMENT/SOCIAL WORK - NSDCPNINST_GEN_ALL_CORE
Perform murcia care at least 2 times per day and after every bowel movement. patient unable to be educated on murcia care at this time.  frequently re-enforced to patient to not touch murcia.

## 2020-05-08 NOTE — H&P ADULT - NECK DETAILS
[facility] Norco as [facility]2/20 for a 30 d/s. UDS done 2/18/20  CSA signed 7/18/19    Last Visit: 2/28/20 with MD Can Crooks  Next Appointment: 8/31/20 with MD Can Crooks  Previous Refill Encounter(s): 4/2/20 #90    Requested Prescriptions     Pending Prescriptions Disp Refills    HYDROcodone-acetaminophen (NORCO)  mg tablet 90 Tab 0     Sig: Take 1 Tab by mouth every eight (8) hours as needed for Pain for up to 30 days. Max Daily Amount: 3 Tabs. no JVD

## 2021-02-23 NOTE — PROGRESS NOTE ADULT - SUBJECTIVE AND OBJECTIVE BOX
Patient is a 89y old  Female who presents with a chief complaint of     pt. seen and examined, condition unchanged     INTERVAL HPI/OVERNIGHT EVENTS:  T(C): 36.7 (01-28-20 @ 23:54), Max: 36.7 (01-28-20 @ 17:01)  HR: 77 (01-28-20 @ 23:54) (77 - 89)  BP: 152/69 (01-28-20 @ 23:54) (135/78 - 159/68)  RR: 18 (01-28-20 @ 23:54) (17 - 18)  SpO2: 99% (01-28-20 @ 23:54) (98% - 99%)  Wt(kg): --  I&O's Summary    27 Jan 2020 07:01  -  28 Jan 2020 07:00  --------------------------------------------------------  IN: 918 mL / OUT: 1725 mL / NET: -807 mL    28 Jan 2020 07:01  -  29 Jan 2020 00:18  --------------------------------------------------------  IN: 480 mL / OUT: 600 mL / NET: -120 mL        PAST MEDICAL & SURGICAL HISTORY:  Obesity  Anemia  Dementia  OA (osteoarthritis)  Chronic low back pain  Shingles  DM (diabetes mellitus)  HTN (hypertension)  S/P hysterectomy with oophorectomy  Uterine fibroid      SOCIAL HISTORY  Alcohol:  Tobacco:  Illicit substance use:    FAMILY HISTORY:    REVIEW OF SYSTEMS:  CONSTITUTIONAL: No fever, weight loss, or fatigue  EYES: No eye pain, visual disturbances, or discharge  ENMT:  No difficulty hearing, tinnitus, vertigo; No sinus or throat pain  NECK: No pain or stiffness  RESPIRATORY: No cough, wheezing, chills or hemoptysis; No shortness of breath  CARDIOVASCULAR: No chest pain, palpitations, dizziness, or leg swelling  GASTROINTESTINAL: No abdominal or epigastric pain. No nausea, vomiting, or hematemesis; No diarrhea or constipation. No melena or hematochezia.  GENITOURINARY: No dysuria, frequency, hematuria, or incontinence  NEUROLOGICAL: No headaches, memory loss, loss of strength, numbness, or tremors  SKIN: No itching, burning, rashes, or lesions   LYMPH NODES: No enlarged glands  ENDOCRINE: No heat or cold intolerance; No hair loss  MUSCULOSKELETAL: No joint pain or swelling; No muscle, back, or extremity pain  PSYCHIATRIC: No depression, anxiety, mood swings, or difficulty sleeping  HEME/LYMPH: No easy bruising, or bleeding gums  ALLERY AND IMMUNOLOGIC: No hives or eczema    RADIOLOGY & ADDITIONAL TESTS:    Imaging Personally Reviewed:  [ ] YES  [ ] NO    Consultant(s) Notes Reviewed:  [ ] YES  [ ] NO    PHYSICAL EXAM:  GENERAL: NAD, well-groomed, well-developed  HEAD:  Atraumatic, Normocephalic  EYES: EOMI, PERRLA, conjunctiva and sclera clear  ENMT: No tonsillar erythema, exudates, or enlargement; Moist mucous membranes, Good dentition, No lesions  NECK: Supple, No JVD, Normal thyroid  NERVOUS SYSTEM:  Alert & Oriented X3, Good concentration; Motor Strength 5/5 B/L upper and lower extremities; DTRs 2+ intact and symmetric  CHEST/LUNG: Clear to percussion bilaterally; No rales, rhonchi, wheezing, or rubs  HEART: Regular rate and rhythm; No murmurs, rubs, or gallops  ABDOMEN: Soft, Nontender, Nondistended; Bowel sounds present  EXTREMITIES:  2+ Peripheral Pulses, No clubbing, cyanosis, or edema  LYMPH: No lymphadenopathy noted  SKIN: No rashes or lesions    LABS:                        10.1   7.88  )-----------( 199      ( 27 Jan 2020 05:45 )             31.9     01-28    136  |  108<H>  |  20  ----------------------------<  138<H>  4.8   |  13<L>  |  1.12    Ca    10.0      28 Jan 2020 05:45          CAPILLARY BLOOD GLUCOSE      POCT Blood Glucose.: 134 mg/dL (28 Jan 2020 20:45)  POCT Blood Glucose.: 133 mg/dL (28 Jan 2020 17:04)  POCT Blood Glucose.: 190 mg/dL (28 Jan 2020 11:46)  POCT Blood Glucose.: 185 mg/dL (28 Jan 2020 07:28)            MEDICATIONS  (STANDING):  amLODIPine   Tablet 10 milliGRAM(s) Oral daily  aspirin enteric coated 81 milliGRAM(s) Oral daily  cefTRIAXone   IVPB 1000 milliGRAM(s) IV Intermittent every 24 hours  dextrose 5%. 1000 milliLiter(s) (50 mL/Hr) IV Continuous <Continuous>  dextrose 50% Injectable 12.5 Gram(s) IV Push once  dextrose 50% Injectable 25 Gram(s) IV Push once  dextrose 50% Injectable 25 Gram(s) IV Push once  heparin  Injectable 5000 Unit(s) SubCutaneous every 8 hours  insulin lispro (HumaLOG) corrective regimen sliding scale   SubCutaneous three times a day before meals  insulin lispro (HumaLOG) corrective regimen sliding scale   SubCutaneous at bedtime  memantine 10 milliGRAM(s) Oral daily  metoprolol tartrate 12.5 milliGRAM(s) Oral two times a day  multivitamin 1 Tablet(s) Oral daily  sodium bicarbonate 650 milliGRAM(s) Oral three times a day  sodium chloride 0.9%. 1000 milliLiter(s) (60 mL/Hr) IV Continuous <Continuous>    MEDICATIONS  (PRN):  acetaminophen   Tablet .. 650 milliGRAM(s) Oral every 6 hours PRN Temp greater or equal to 38C (100.4F), Mild Pain (1 - 3), Moderate Pain (4 - 6), Severe Pain (7 - 10)  bisacodyl 5 milliGRAM(s) Oral daily PRN Constipation  dextrose 40% Gel 15 Gram(s) Oral once PRN Blood Glucose LESS THAN 70 milliGRAM(s)/deciliter  glucagon  Injectable 1 milliGRAM(s) IntraMuscular once PRN Glucose LESS THAN 70 milligrams/deciliter  meclizine 25 milliGRAM(s) Oral two times a day PRN Dizziness  ondansetron Injectable 4 milliGRAM(s) IV Push every 6 hours PRN Nausea  senna 2 Tablet(s) Oral at bedtime PRN Constipation      Care Discussed with Consultants/Other Providers [ ] YES  [ ] NO [Home] : at home, [unfilled] , at the time of the visit. [Medical Office: ([facility])___] : at the medical office located in  [Verbal consent obtained from patient] : the patient, [unfilled] [TextBox_4] : Ms. 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 Her chief complaint is recent asthma attack.\par -she reports feeling well\par -she notes she started exercising 2 days ago on the treadmill. She reports she suddenly developed extreme chest pressure and airway tightness. She immediately used her inhaler and it took about 20 minutes to clear. She doesn’t use her rescue inhaler before exercise\par -she states she has a constant taste in her mouth since being sick\par -she reports she is having sleeping issues. she gets about 5 hours of sleep nightly\par -she reports she moves around at night. She gets leg cramps both at night and during the day\par -she states her diet has improved\par -she reports she drinks a large amount of water\par -she states her sinuses are clear\par -she notes her cough is triggered by cold air and long conversations\par -she denies any visual issues, headaches, nausea, vomiting, fever, chills, sweats, chest pain, chest pressure, diarrhea, constipation, dysphagia, dizziness, sour taste in the mouth, leg swelling, leg pain, itchy eyes, itchy ears, heartburn, reflux, myalgias or arthralgias.

## 2021-06-13 NOTE — PHYSICAL THERAPY INITIAL EVALUATION ADULT - REFERRING PHYSICIAN, REHAB EVAL
Clinic Care Coordination Contact  [facility]/Voicemail       Clinical Data: Care Coordinator Outreach  Outreach attempted x 2.  Left message on patient's mother voicemail with call back information and requested return call.  Plan: Care Coordinator ujpdate and discuss goal progression   Care Coordinator will try to reach patient again in less than 2 weeks.  12/4/2020  If unable to reach/connect with patient's mother will send disenrollment letter     Fanny Ruth

## 2021-08-07 NOTE — PROGRESS NOTE ADULT - ASSESSMENT
Dizziness  unclear if purely vertigo  monitor on tele  echo unremarkable   fu with neurology  MRI reviewed     HTN  stable  cont current meds    DM  Monitor finger stick. Insulin coverage. Diabetic education and Diabetic diet. Consider nutrition consultation. initiation of breastfeeding/breast milk feeding

## 2021-08-20 NOTE — H&P ADULT - NS MD HP PULSE CAROTID
What Type Of Note Output Would You Prefer (Optional)?: Standard Output How Severe Is Your Skin Lesion?: mild Has Your Skin Lesion Been Treated?: not been treated Is This A New Presentation, Or A Follow-Up?: Skin Lesion right normal/left normal

## 2021-09-15 NOTE — ED PROVIDER NOTE - NS ED MD DISPO DIVISION
5-Fu Counseling: 5-Fluorouracil Counseling:  I discussed with the patient the risks of 5-fluorouracil including but not limited to erythema, scaling, itching, weeping, crusting, and pain. ROSE

## 2021-09-20 NOTE — H&P ADULT - PSYCHIATRIC COMMENTS
Nadia Villar is a 15 year old male presenting with his Mother, Shelley, with complaints of cough, sore throat, the chills, muscle pain, sinus congestion and body aches.    Symptoms started 5 days ago.    OTC medications used:   None  Denies  known Latex allergy or symptoms of Latex sensitivity.  Social History     Tobacco Use   Smoking Status Passive Smoke Exposure - Never Smoker   Smokeless Tobacco Never Used     All allergies and medications reviewed.  PCP verified:  No PCP at this time.    Pharmacy verified:   [facility] in [place]  Patient would like communication of their results via:        Cell Phone:    Mom's cell---Shelley  Telephone Information:   Mobile [phone]     Okay to leave a message containing results? Yes     Confused Confused, Dementia,

## 2021-11-17 NOTE — PROGRESS NOTE ADULT - ASSESSMENT
89 year old Creole speaking female, presents with dizziness x 1 week, nephrology consulted for hypercalcemia    Hypercalcemia  PTH elevated, suggested of primary hyperparathyroidism vs familial hypocalciuric hypercalcemia   Can consider 24 hr urine collection for calcium, or can be monitor and work up outpatient   Ca improved with IVF  pending SPEP, SIF, immuno lightchain to r/o multiplemyloma   pending vit d 25, vit d 1,25  Avoid calcium supplements  Monitor serum calcium     CKD stage 3  baseline ~ 1-1.1  mildly elevated today-- consider NS for 1 L   likely sec to age/HTN/DM  avoid nephrotoxic agent  monitor renal function     HTN  acceptable  monitor BP    Acidosis  Non-AG  NaHCO3 650mg TID for 3 days  continue to monitor serum Co2 room air

## 2022-05-14 NOTE — PATIENT PROFILE ADULT - JOB HELP
Notified patient of lab result. No questions or concerns.
Subjective   Patient ID: Shamika Santos is a 56 year old female.    Chief Complaint   Patient presents with   • Sore Throat     Pharyngitis   This is a new problem. The current episode started yesterday. The problem has been unchanged. Neither side of throat is experiencing more pain than the other. There has been no fever. The pain is mild. Associated symptoms comments: Post nasal drainage. COVID neg at home. She has tried nothing for the symptoms.     Patient's medications, allergies, past medical, surgical, social and family histories were reviewed and updated as appropriate.    Review of Systems   HENT: Positive for postnasal drip and sore throat.      Objective   Physical Exam  Constitutional:       Appearance: Normal appearance.   HENT:      Right Ear: Tympanic membrane, ear canal and external ear normal.      Left Ear: Tympanic membrane, ear canal and external ear normal.      Nose: Nose normal.      Mouth/Throat:      Mouth: Mucous membranes are moist.      Pharynx: Oropharynx is clear.      Neck: Normal range of motion and neck supple.   Cardiovascular:      Rate and Rhythm: Normal rate and regular rhythm.      Heart sounds: Normal heart sounds.   Pulmonary:      Effort: Pulmonary effort is normal.      Breath sounds: Normal breath sounds.   Neurological:      Mental Status: She is alert.       Assessment   Diagnoses and all orders for this visit:  Pharyngitis, unspecified etiology  -     POCT RAPID STREP A  -     STREPTOCOCCUS GROUP A (STREPTOCOCCUS PYOGENES), BACTERIAL CULTURE  - zyrtec or claritin per package instructions.  Ibuprofen or tylenol as needed,  
no

## 2022-07-27 NOTE — ED ADULT NURSE NOTE - HEART RATE (BEATS/MIN)
----- Message from Crow Reis sent at 7/27/2022 10:36 AM CDT -----  Contact: pt  Who Called: PT  Regarding: The pt is calling to be scheduled for a hospital follow up appointment stating she was released from the assisted living facility and need to be seen within 10 days. She is requesting a callback for scheduling.   Would the patient rather a call back or a response via [facility]? Call back  Best Call Back Number: [phone]  Additional Information:         95

## 2022-08-03 NOTE — DIETITIAN INITIAL EVALUATION ADULT. - PROBLEM/PLAN-2
for ir guided tube placement Recent > 10 day history of constipation  BM yesterday according to the nurse DISPLAY PLAN FREE TEXT

## 2022-11-22 NOTE — H&P ADULT - NS NEC GEN PE MLT EXAM PC
Per patient's mother Roseline, they saw the Neurosurgeon but it did not sound as though he was comfortable with doing surgery at this time, but did not feel they were really given a reason. Roseline stated the Neurosurgeon suggested patient try Botox injections first and if that did not help, then he would have patient referred back to him where he would then potentially consider doing the surgery. Roseline stated the Neurosurgeon's office had referred patient to a Neurologist/headache doctor for Botox injections, but that physician would not do the injections because of patient's age.     Upon review, initial referral was sent to Dr. Nicholas's office, who declined due to patient's age. It appeared they then sent a referral to Sorin Juarez.    Message was routed to provider for review/recommendations.     detailed exam

## 2023-03-25 NOTE — PROGRESS NOTE ADULT - ASSESSMENT
89 Creole speaking female, h/o dementia, DM2, HTN,  presented from aide for evaluation of AMS. Renal following for MAGALIE.     MAGALIE on CKD 3 b/l Cr 1.1-1.3 07/2019  MAGALIE pre-renal azotemia vs post renal -> resolved  clinically euvolemic, cr improved and relatively stable   no hydro on renal sono    HTN-controlled  DM- Mx per primary team  Dispo planning amLODIPine 10 mg oral tablet: 1 tab(s) orally once a day  aspirin 81 mg oral tablet, chewable: 1 tab(s) orally once a day  cholecalciferol 125 mcg (5000 intl units) oral capsule: 1 cap(s) orally once a day  clopidogrel 75 mg oral tablet: 1 tab(s) orally once a day  diphenhydrAMINE 25 mg oral capsule: 1 cap(s) orally once a day (at bedtime), As Needed  gabapentin 100 mg oral capsule: 1 capsule (100 mG in the morning) and 2 capsules (200 mG) in the evening  melatonin: at bedtime  metoprolol succinate 100 mg oral tablet, extended release: 1 tab(s) orally once a day  Multiple Vitamins oral tablet: 1 tab(s) orally once a day  Orencia Prefilled Syringe: every month  oxyCODONE 10 mg oral tablet: 1 tab(s) orally every 6 hours, As Needed  rosuvastatin 10 mg oral tablet: 1 tab(s) orally once a day  senna leaf extract oral tablet: 2 tab(s) orally once a day (at bedtime)  valsartan 320 mg oral tablet: 1 tab(s) orally once a day Restart on 3/26. Please take your BP prior to restarting.  Vitamin B Complex oral tablet: 1 tab(s) orally once a day

## 2023-07-06 NOTE — H&P ADULT - NEUROLOGICAL
Anesthesia Post-op Note    Patient: Martha Escamilla  Procedure(s) Performed: IR TRANSCATHETER INTRACRANIAL EMBOLIZATION  Anesthesia type: General    Vitals Value Taken Time   Temp 36.6 °C (97.9 °F) 07/06/23 1200   Pulse 65 07/06/23 1215   Resp 14 07/06/23 1215   SpO2 96 % 07/06/23 1215   /75 07/06/23 1215         Patient Location: ICU  Post-op Vital Signs:stable  Level of Consciousness: awake and alert  Respiratory Status: spontaneous ventilation  Cardiovascular stable  Hydration: euvolemic  Pain Management: adequately controlled  Handoff: Handoff to receiving clinician was performed and questions were answered  Vomiting: none  Nausea: None  Airway Patency:patent  Post-op Assessment: no complications and patient tolerated procedure well      No notable events documented.   detailed exam

## 2023-09-20 NOTE — H&P ADULT - REASON FOR ADMISSION
Fall Fall at home [place]poni Counseling:  I discussed with the patient the risks of golimumab including but not limited to myelosuppression, immunosuppression, autoimmune hepatitis, demyelinating diseases, lymphoma, and serious infections.  The patient understands that monitoring is required including a PPD at baseline and must alert us or the primary physician if symptoms of infection or other concerning signs are noted.

## 2023-11-03 NOTE — ED ADULT NURSE NOTE - NSFALLRSKASSESSDT_ED_ALL_ED
EGD INSTRUCTIONS     Re: [facility]   [address] [place] [address]    Provider: Neno Rivas MD    Your exam is scheduled as an outpatient procedure on:     Day: Friday    Date: MARCH 1 2024    Arrival Time:  You will receive a call 3 days before your appointment, if you do not receive a call or have questions, please call [phone].     You will be receiving sedation for your procedure and MUST have an adult over 18 to drive you home and be with you after procedure.     Location: [place] [address]- Enter through the main entrance.             7 days before: Review your instructions. (Instructions can also be found on [facility] under the letters tab under communication)    3 days before:Stop taking all anti-inflammatory medicines. These include: Advil, Aleve, Naprosyn, (Tylenol is okay to take)    1 day before:   Eat normal diet throughout the day   At MIDNIGHT start a strict clear liquid diet    A clear liquid diet can include: Apple juice, white grape juice, and white cranberry juice; Beef or chicken broths that are clear - no noodles, vegetables, rice, etc.Tea and coffee without milk; -Soda pop, Gatorade, Reagan-Aid and various -Jell-O Flavors (any color except red or purple)  •Avoid: juices with pulp, milk, cream, solid food, alcohol, Gum, and hard candy.  -Remove ALL jewelry and piercings (rings, necklaces, all body piercings, etc) prior to arrival for procedure.      • Take your medications; lisinopril-hydroCHLOROthiazide (ZESTORETIC) 10-12.5 MG per tablet, venlafaxine (EFFEXOR) 37.5 MG tablet with a sip of water 4 hours prior to your arrival time. STOP ALL LIQUIDS INCLUDING WATER AT THIS TIME.                 28-Oct-2018 10:52

## 2024-03-20 NOTE — H&P ADULT. - EYES
Drysol Pregnancy And Lactation Text: This medication is considered safe during pregnancy and breast feeding. Doxycycline Pregnancy And Lactation Text: This medication is Pregnancy Category D and not consider safe during pregnancy. It is also excreted in breast milk but is considered safe for shorter treatment courses. Birth Control Pills Pregnancy And Lactation Text: This medication should be avoided if pregnant and for the first 30 days post-partum. Siliq Pregnancy And Lactation Text: The risk during pregnancy and breastfeeding is uncertain with this medication. Rhofade Pregnancy And Lactation Text: This medication has not been assigned a Pregnancy Risk Category. It is unknown if the medication is excreted in breast milk. Topical Clindamycin Counseling: Patient counseled that this medication may cause skin irritation or allergic reactions.  In the event of skin irritation, the patient was advised to reduce the amount of the drug applied or use it less frequently.   The patient verbalized understanding of the proper use and possible adverse effects of clindamycin.  All of the patient's questions and concerns were addressed. Opzelura Counseling:  I discussed with the patient the risks of Opzelura including but not limited to nasopharngitis, bronchitis, ear infection, eosinophila, hives, diarrhea, folliculitis, tonsillitis, and rhinorrhea.  Taken orally, this medication has been linked to serious infections; higher rate of mortality; malignancy and lymphoproliferative disorders; major adverse cardiovascular events; thrombosis; thrombocytopenia, anemia, and neutropenia; and lipid elevations. Cyclophosphamide Pregnancy And Lactation Text: This medication is Pregnancy Category D and it isn't considered safe during pregnancy. This medication is excreted in breast milk. Terbinafine Counseling: Patient counseling regarding adverse effects of terbinafine including but not limited to headache, diarrhea, rash, upset stomach, liver function test abnormalities, itching, taste/smell disturbance, nausea, abdominal pain, and flatulence.  There is a rare possibility of liver failure that can occur when taking terbinafine.  The patient understands that a baseline LFT and kidney function test may be required. The patient verbalized understanding of the proper use and possible adverse effects of terbinafine.  All of the patient's questions and concerns were addressed. Colchicine Counseling:  Patient counseled regarding adverse effects including but not limited to stomach upset (nausea, vomiting, stomach pain, or diarrhea).  Patient instructed to limit alcohol consumption while taking this medication.  Colchicine may reduce blood counts especially with prolonged use.  The patient understands that monitoring of kidney function and blood counts may be required, especially at baseline. The patient verbalized understanding of the proper use and possible adverse effects of colchicine.  All of the patient's questions and concerns were addressed. Low Dose Naltrexone Pregnancy And Lactation Text: Naltrexone is pregnancy category C.  There have been no adequate and well-controlled studies in pregnant women.  It should be used in pregnancy only if the potential benefit justifies the potential risk to the fetus.   Limited data indicates that naltrexone is minimally excreted into breastmilk. Rifampin Counseling: I discussed with the patient the risks of rifampin including but not limited to liver damage, kidney damage, red-orange body fluids, nausea/vomiting and severe allergy. Topical Sulfur Applications Pregnancy And Lactation Text: This medication is Pregnancy Category C and has an unknown safety profile during pregnancy. It is unknown if this topical medication is excreted in breast milk. Zyclara Counseling:  I discussed with the patient the risks of imiquimod including but not limited to erythema, scaling, itching, weeping, crusting, and pain.  Patient understands that the inflammatory response to imiquimod is variable from person to person and was educated regarded proper titration schedule.  If flu-like symptoms develop, patient knows to discontinue the medication and contact us. Cimzia Counseling:  I discussed with the patient the risks of Cimzia including but not limited to immunosuppression, allergic reactions and infections.  The patient understands that monitoring is required including a PPD at baseline and must alert us or the primary physician if symptoms of infection or other concerning signs are noted. Albendazole Counseling:  I discussed with the patient the risks of albendazole including but not limited to cytopenia, kidney damage, nausea/vomiting and severe allergy.  The patient understands that this medication is being used in an off-label manner. Fluconazole Counseling:  Patient counseled regarding adverse effects of fluconazole including but not limited to headache, diarrhea, nausea, upset stomach, liver function test abnormalities, taste disturbance, and stomach pain.  There is a rare possibility of liver failure that can occur when taking fluconazole.  The patient understands that monitoring of LFTs and kidney function test may be required, especially at baseline. The patient verbalized understanding of the proper use and possible adverse effects of fluconazole.  All of the patient's questions and concerns were addressed. Benzoyl Peroxide Pregnancy And Lactation Text: This medication is Pregnancy Category C. It is unknown if benzoyl peroxide is excreted in breast milk. High Dose Vitamin A Counseling: Side effects reviewed, pt to contact office should one occur. Otezla Counseling: The side effects of Otezla were discussed with the patient, including but not limited to worsening or new depression, weight loss, diarrhea, nausea, upper respiratory tract infection, and headache. Patient instructed to call the office should any adverse effect occur.  The patient verbalized understanding of the proper use and possible adverse effects of Otezla.  All the patient's questions and concerns were addressed. Xeljanz Counseling: I discussed with the patient the risks of Xeljanz therapy including increased risk of infection, liver issues, headache, diarrhea, or cold symptoms. Live vaccines should be avoided. They were instructed to call if they have any problems. Imiquimod Counseling:  I discussed with the patient the risks of imiquimod including but not limited to erythema, scaling, itching, weeping, crusting, and pain.  Patient understands that the inflammatory response to imiquimod is variable from person to person and was educated regarded proper titration schedule.  If flu-like symptoms develop, patient knows to discontinue the medication and contact us. Azithromycin Pregnancy And Lactation Text: This medication is considered safe during pregnancy and is also secreted in breast milk. Cyclosporine Counseling:  I discussed with the patient the risks of cyclosporine including but not limited to hypertension, gingival hyperplasia,myelosuppression, immunosuppression, liver damage, kidney damage, neurotoxicity, lymphoma, and serious infections. The patient understands that monitoring is required including baseline blood pressure, CBC, CMP, lipid panel and uric acid, and then 1-2 times monthly CMP and blood pressure. Terbinafine Pregnancy And Lactation Text: This medication is Pregnancy Category B and is considered safe during pregnancy. It is also excreted in breast milk and breast feeding isn't recommended. Litfulo Counseling: I discussed with the patient the risks of Litfulo therapy including but not limited to upper respiratory tract infections, shingles, cold sores, and nausea. Live vaccines should be avoided.  This medication has been linked to serious infections; higher rate of mortality; malignancy and lymphoproliferative disorders; major adverse cardiovascular events; thrombosis; gastrointestinal perforations; neutropenia; lymphopenia; anemia; liver enzyme elevations; and lipid elevations. Tranexamic Acid Pregnancy And Lactation Text: It is unknown if this medication is safe during pregnancy or breast feeding. Humira Pregnancy And Lactation Text: This medication is Pregnancy Category B and is considered safe during pregnancy. It is unknown if this medication is excreted in breast milk. Tremfya Counseling: I discussed with the patient the risks of guselkumab including but not limited to immunosuppression, serious infections, and drug reactions.  The patient understands that monitoring is required including a PPD at baseline and must alert us or the primary physician if symptoms of infection or other concerning signs are noted. Opzelura Pregnancy And Lactation Text: There is insufficient data to evaluate drug-associated risk for major birth defects, miscarriage, or other adverse maternal or fetal outcomes.  There is a pregnancy registry that monitors pregnancy outcomes in pregnant persons exposed to the medication during pregnancy.  It is unknown if this medication is excreted in breast milk.  Do not breastfeed during treatment and for about 4 weeks after the last dose. Spironolactone Counseling: Patient advised regarding risks of diarrhea, abdominal pain, hyperkalemia, birth defects (for female patients), liver toxicity and renal toxicity. The patient may need blood work to monitor liver and kidney function and potassium levels while on therapy. The patient verbalized understanding of the proper use and possible adverse effects of spironolactone.  All of the patient's questions and concerns were addressed. Simponi Counseling:  I discussed with the patient the risks of golimumab including but not limited to myelosuppression, immunosuppression, autoimmune hepatitis, demyelinating diseases, lymphoma, and serious infections.  The patient understands that monitoring is required including a PPD at baseline and must alert us or the primary physician if symptoms of infection or other concerning signs are noted. Elidel Counseling: Patient may experience a mild burning sensation during topical application. Elidel is not approved in children less than 2 years of age. There have been case reports of hematologic and skin malignancies in patients using topical calcineurin inhibitors although causality is questionable. Cimzia Pregnancy And Lactation Text: This medication crosses the placenta but can be considered safe in certain situations. Cimzia may be excreted in breast milk. Wartpeel Counseling:  I discussed with the patient the risks of Wartpeel including but not limited to erythema, scaling, itching, weeping, crusting, and pain. Zyclara Pregnancy And Lactation Text: This medication is Pregnancy Category C. It is unknown if this medication is excreted in breast milk. Topical Clindamycin Pregnancy And Lactation Text: This medication is Pregnancy Category B and is considered safe during pregnancy. It is unknown if it is excreted in breast milk. Erythromycin Counseling:  I discussed with the patient the risks of erythromycin including but not limited to GI upset, allergic reaction, drug rash, diarrhea, increase in liver enzymes, and yeast infections. Solaraze Counseling:  I discussed with the patient the risks of Solaraze including but not limited to erythema, scaling, itching, weeping, crusting, and pain. Niacinamide Counseling: I recommended taking niacin or niacinamide, also know as vitamin B3, twice daily. Recent evidence suggests that taking vitamin B3 (500 mg twice daily) can reduce the risk of actinic keratoses and non-melanoma skin cancers. Side effects of vitamin B3 include flushing and headache. Dutasteride Male Counseling: Dustasteride Counseling:  I discussed with the patient the risks of use of dutasteride including but not limited to decreased libido, decreased ejaculate volume, and gynecomastia. Women who can become pregnant should not handle medication.  All of the patient's questions and concerns were addressed. Colchicine Pregnancy And Lactation Text: This medication is Pregnancy Category C and isn't considered safe during pregnancy. It is excreted in breast milk. Xelenocz Pregnancy And Lactation Text: This medication is Pregnancy Category D and is not considered safe during pregnancy.  The risk during breast feeding is also uncertain. detailed exam Albendazole Pregnancy And Lactation Text: This medication is Pregnancy Category C and it isn't known if it is safe during pregnancy. It is also excreted in breast milk. conjunctiva clear/EOMI/PERRL Valtrex Counseling: I discussed with the patient the risks of valacyclovir including but not limited to kidney damage, nausea, vomiting and severe allergy.  The patient understands that if the infection seems to be worsening or is not improving, they are to call. Bactrim Counseling:  I discussed with the patient the risks of sulfa antibiotics including but not limited to GI upset, allergic reaction, drug rash, diarrhea, dizziness, photosensitivity, and yeast infections.  Rarely, more serious reactions can occur including but not limited to aplastic anemia, agranulocytosis, methemoglobinemia, blood dyscrasias, liver or kidney failure, lung infiltrates or desquamative/blistering drug rashes. Fluconazole Pregnancy And Lactation Text: This medication is Pregnancy Category C and it isn't know if it is safe during pregnancy. It is also excreted in breast milk. Ilumya Counseling: I discussed with the patient the risks of tildrakizumab including but not limited to immunosuppression, malignancy, posterior leukoencephalopathy syndrome, and serious infections.  The patient understands that monitoring is required including a PPD at baseline and must alert us or the primary physician if symptoms of infection or other concerning signs are noted. High Dose Vitamin A Pregnancy And Lactation Text: High dose vitamin A therapy is contraindicated during pregnancy and breast feeding. Otezla Pregnancy And Lactation Text: This medication is Pregnancy Category C and it isn't known if it is safe during pregnancy. It is unknown if it is excreted in breast milk. Cyclosporine Pregnancy And Lactation Text: This medication is Pregnancy Category C and it isn't know if it is safe during pregnancy. This medication is excreted in breast milk. Solaraze Pregnancy And Lactation Text: This medication is Pregnancy Category B and is considered safe. There is some data to suggest avoiding during the third trimester. It is unknown if this medication is excreted in breast milk. Wartpeel Pregnancy And Lactation Text: This medication is Pregnancy Category X and contraindicated in pregnancy and in women who may become pregnant. It is unknown if this medication is excreted in breast milk. Litfulo Pregnancy And Lactation Text: Based on animal studies, Lifulo may cause embryo-fetal harm when administered to pregnant women.  The medication should not be used in pregnancy.  Breastfeeding is not recommended during treatment. Topical Ketoconazole Counseling: Patient counseled that this medication may cause skin irritation or allergic reactions.  In the event of skin irritation, the patient was advised to reduce the amount of the drug applied or use it less frequently.   The patient verbalized understanding of the proper use and possible adverse effects of ketoconazole.  All of the patient's questions and concerns were addressed. Carac Counseling:  I discussed with the patient the risks of Carac including but not limited to erythema, scaling, itching, weeping, crusting, and pain. Picato Counseling:  I discussed with the patient the risks of Picato including but not limited to erythema, scaling, itching, weeping, crusting, and pain. Spironolactone Pregnancy And Lactation Text: This medication can cause feminization of the male fetus and should be avoided during pregnancy. The active metabolite is also found in breast milk. Dapsone Counseling: I discussed with the patient the risks of dapsone including but not limited to hemolytic anemia, agranulocytosis, rashes, methemoglobinemia, kidney failure, peripheral neuropathy, headaches, GI upset, and liver toxicity.  Patients who start dapsone require monitoring including baseline LFTs and weekly CBCs for the first month, then every month thereafter.  The patient verbalized understanding of the proper use and possible adverse effects of dapsone.  All of the patient's questions and concerns were addressed. Cosentyx Counseling:  I discussed with the patient the risks of Cosentyx including but not limited to worsening of Crohn's disease, immunosuppression, allergic reactions and infections.  The patient understands that monitoring is required including a PPD at baseline and must alert us or the primary physician if symptoms of infection or other concerning signs are noted. Erythromycin Pregnancy And Lactation Text: This medication is Pregnancy Category B and is considered safe during pregnancy. It is also excreted in breast milk. Niacinamide Pregnancy And Lactation Text: These medications are considered safe during pregnancy. Bactrim Pregnancy And Lactation Text: This medication is Pregnancy Category D and is known to cause fetal risk.  It is also excreted in breast milk. Dutasteride Female Counseling: Dutasteride Counseling:  I discussed with the patient the risks of use of dutasteride including but not limited to decreased libido and sexual dysfunction. Explained the teratogenic nature of the medication and stressed the importance of not getting pregnant during treatment. All of the patient's questions and concerns were addressed. Klisyri Counseling:  I discussed with the patient the risks of Klisyri including but not limited to erythema, scaling, itching, weeping, crusting, and pain. Ivermectin Counseling:  Patient instructed to take medication on an empty stomach with a full glass of water.  Patient informed of potential adverse effects including but not limited to nausea, diarrhea, dizziness, itching, and swelling of the extremities or lymph nodes.  The patient verbalized understanding of the proper use and possible adverse effects of ivermectin.  All of the patient's questions and concerns were addressed. Cimetidine Counseling:  I discussed with the patient the risks of Cimetidine including but not limited to gynecomastia, headache, diarrhea, nausea, drowsiness, arrhythmias, pancreatitis, skin rashes, psychosis, bone marrow suppression and kidney toxicity. Oxybutynin Counseling:  I discussed with the patient the risks of oxybutynin including but not limited to skin rash, drowsiness, dry mouth, difficulty urinating, and blurred vision. Valtrex Pregnancy And Lactation Text: this medication is Pregnancy Category B and is considered safe during pregnancy. This medication is not directly found in breast milk but it's metabolite acyclovir is present. Xolair Counseling:  Patient informed of potential adverse effects including but not limited to fever, muscle aches, rash and allergic reactions.  The patient verbalized understanding of the proper use and possible adverse effects of Xolair.  All of the patient's questions and concerns were addressed. Soolantra Counseling: I discussed with the patients the risks of topial Soolantra. This is a medicine which decreases the number of mites and inflammation in the skin. You experience burning, stinging, eye irritation or allergic reactions.  Please call our office if you develop any problems from using this medication. Griseofulvin Counseling:  I discussed with the patient the risks of griseofulvin including but not limited to photosensitivity, cytopenia, liver damage, nausea/vomiting and severe allergy.  The patient understands that this medication is best absorbed when taken with a fatty meal (e.g., ice cream or french fries). Opioid Counseling: I discussed with the patient the potential side effects of opioids including but not limited to addiction, altered mental status, and depression. I stressed avoiding alcohol, benzodiazepines, muscle relaxants and sleep aids unless specifically okayed by a physician. The patient verbalized understanding of the proper use and possible adverse effects of opioids. All of the patient's questions and concerns were addressed. They were instructed to flush the remaining pills down the toilet if they did not need them for pain. Metronidazole Counseling:  I discussed with the patient the risks of metronidazole including but not limited to seizures, nausea/vomiting, a metallic taste in the mouth, nausea/vomiting and severe allergy. Winlevi Counseling:  I discussed with the patient the risks of topical clascoterone including but not limited to erythema, scaling, itching, and stinging. Patient voiced their understanding. Methotrexate Counseling:  Patient counseled regarding adverse effects of methotrexate including but not limited to nausea, vomiting, abnormalities in liver function tests. Patients may develop mouth sores, rash, diarrhea, and abnormalities in blood counts. The patient understands that monitoring is required including LFT's and blood counts.  There is a rare possibility of scarring of the liver and lung problems that can occur when taking methotrexate. Persistent nausea, loss of appetite, pale stools, dark urine, cough, and shortness of breath should be reported immediately. Patient advised to discontinue methotrexate treatment at least three months before attempting to become pregnant.  I discussed the need for folate supplements while taking methotrexate.  These supplements can decrease side effects during methotrexate treatment. The patient verbalized understanding of the proper use and possible adverse effects of methotrexate.  All of the patient's questions and concerns were addressed. Olumiant Counseling: I discussed with the patient the risks of Olumiant therapy including but not limited to upper respiratory tract infections, shingles, cold sores, and nausea. Live vaccines should be avoided.  This medication has been linked to serious infections; higher rate of mortality; malignancy and lymphoproliferative disorders; major adverse cardiovascular events; thrombosis; gastrointestinal perforations; neutropenia; lymphopenia; anemia; liver enzyme elevations; and lipid elevations. Acitretin Counseling:  I discussed with the patient the risks of acitretin including but not limited to hair loss, dry lips/skin/eyes, liver damage, hyperlipidemia, depression/suicidal ideation, photosensitivity.  Serious rare side effects can include but are not limited to pancreatitis, pseudotumor cerebri, bony changes, clot formation/stroke/heart attack.  Patient understands that alcohol is contraindicated since it can result in liver toxicity and significantly prolong the elimination of the drug by many years. Dapsone Pregnancy And Lactation Text: This medication is Pregnancy Category C and is not considered safe during pregnancy or breast feeding. Nsaids Counseling: NSAID Counseling: I discussed with the patient that NSAIDs should be taken with food. Prolonged use of NSAIDs can result in the development of stomach ulcers.  Patient advised to stop taking NSAIDs if abdominal pain occurs.  The patient verbalized understanding of the proper use and possible adverse effects of NSAIDs.  All of the patient's questions and concerns were addressed. Skyrizi Counseling: I discussed with the patient the risks of risankizumab-rzaa including but not limited to immunosuppression, and serious infections.  The patient understands that monitoring is required including a PPD at baseline and must alert us or the primary physician if symptoms of infection or other concerning signs are noted. Dutasteride Pregnancy And Lactation Text: This medication is absolutely contraindicated in women, especially during pregnancy and breast feeding. Feminization of male fetuses is possible if taking while pregnant. Infliximab Counseling:  I discussed with the patient the risks of infliximab including but not limited to myelosuppression, immunosuppression, autoimmune hepatitis, demyelinating diseases, lymphoma, and serious infections.  The patient understands that monitoring is required including a PPD at baseline and must alert us or the primary physician if symptoms of infection or other concerning signs are noted. Opioid Pregnancy And Lactation Text: These medications can lead to premature delivery and should be avoided during pregnancy. These medications are also present in breast milk in small amounts. Xolair Pregnancy And Lactation Text: This medication is Pregnancy Category B and is considered safe during pregnancy. This medication is excreted in breast milk. Klisyri Pregnancy And Lactation Text: It is unknown if this medication can harm a developing fetus or if it is excreted in breast milk. Use Enhanced Medication Counseling?: No Cephalexin Counseling: I counseled the patient regarding use of cephalexin as an antibiotic for prophylactic and/or therapeutic purposes. Cephalexin (commonly prescribed under brand name Keflex) is a cephalosporin antibiotic which is active against numerous classes of bacteria, including most skin bacteria. Side effects may include nausea, diarrhea, gastrointestinal upset, rash, hives, yeast infections, and in rare cases, hepatitis, kidney disease, seizures, fever, confusion, neurologic symptoms, and others. Patients with severe allergies to penicillin medications are cautioned that there is about a 10% incidence of cross-reactivity with cephalosporins. When possible, patients with penicillin allergies should use alternatives to cephalosporins for antibiotic therapy. Griseofulvin Pregnancy And Lactation Text: This medication is Pregnancy Category X and is known to cause serious birth defects. It is unknown if this medication is excreted in breast milk but breast feeding should be avoided. Azathioprine Counseling:  I discussed with the patient the risks of azathioprine including but not limited to myelosuppression, immunosuppression, hepatotoxicity, lymphoma, and infections.  The patient understands that monitoring is required including baseline LFTs, Creatinine, possible TPMP genotyping and weekly CBCs for the first month and then every 2 weeks thereafter.  The patient verbalized understanding of the proper use and possible adverse effects of azathioprine.  All of the patient's questions and concerns were addressed. Soolantra Pregnancy And Lactation Text: This medication is Pregnancy Category C. This medication is considered safe during breast feeding. Rifampin Pregnancy And Lactation Text: This medication is Pregnancy Category C and it isn't know if it is safe during pregnancy. It is also excreted in breast milk and should not be used if you are breast feeding. Protopic Counseling: Patient may experience a mild burning sensation during topical application. Protopic is not approved in children less than 2 years of age. There have been case reports of hematologic and skin malignancies in patients using topical calcineurin inhibitors although causality is questionable. Calcipotriene Counseling:  I discussed with the patient the risks of calcipotriene including but not limited to erythema, scaling, itching, and irritation. Metronidazole Pregnancy And Lactation Text: This medication is Pregnancy Category B and considered safe during pregnancy.  It is also excreted in breast milk. Winlevi Pregnancy And Lactation Text: This medication is considered safe during pregnancy and breastfeeding. Nsaids Pregnancy And Lactation Text: These medications are considered safe up to 30 weeks gestation. It is excreted in breast milk. Methotrexate Pregnancy And Lactation Text: This medication is Pregnancy Category X and is known to cause fetal harm. This medication is excreted in breast milk. Acitretin Pregnancy And Lactation Text: This medication is Pregnancy Category X and should not be given to women who are pregnant or may become pregnant in the future. This medication is excreted in breast milk. Olumiant Pregnancy And Lactation Text: Based on animal studies, Olumiant may cause embryo-fetal harm when administered to pregnant women.  The medication should not be used in pregnancy.  Breastfeeding is not recommended during treatment. Glycopyrrolate Counseling:  I discussed with the patient the risks of glycopyrrolate including but not limited to skin rash, drowsiness, dry mouth, difficulty urinating, and blurred vision. Topical Metronidazole Counseling: Metronidazole is a topical antibiotic medication. You may experience burning, stinging, redness, or allergic reactions.  Please call our office if you develop any problems from using this medication. Azathioprine Pregnancy And Lactation Text: This medication is Pregnancy Category D and isn't considered safe during pregnancy. It is unknown if this medication is excreted in breast milk. Aklief counseling:  Patient advised to apply a pea-sized amount only at bedtime and wait 30 minutes after washing their face before applying.  If too drying, patient may add a non-comedogenic moisturizer.  The most commonly reported side effects including irritation, redness, scaling, dryness, stinging, burning, itching, and increased risk of sunburn.  The patient verbalized understanding of the proper use and possible adverse effects of retinoids.  All of the patient's questions and concerns were addressed. Doxepin Counseling:  Patient advised that the medication is sedating and not to drive a car after taking this medication. Patient informed of potential adverse effects including but not limited to dry mouth, urinary retention, and blurry vision.  The patient verbalized understanding of the proper use and possible adverse effects of doxepin.  All of the patient's questions and concerns were addressed. Propranolol Counseling:  I discussed with the patient the risks of propranolol including but not limited to low heart rate, low blood pressure, low blood sugar, restlessness and increased cold sensitivity. They should call the office if they experience any of these side effects. Calcipotriene Pregnancy And Lactation Text: The use of this medication during pregnancy or lactation is not recommended as there is insufficient data. Gabapentin Counseling: I discussed with the patient the risks of gabapentin including but not limited to dizziness, somnolence, fatigue and ataxia. Detail Level: Simple Finasteride Male Counseling: Finasteride Counseling:  I discussed with the patient the risks of use of finasteride including but not limited to decreased libido, decreased ejaculate volume, gynecomastia, and depression. Women should not handle medication.  All of the patient's questions and concerns were addressed. Dupixent Counseling: I discussed with the patient the risks of dupilumab including but not limited to eye infection and irritation, cold sores, injection site reactions, worsening of asthma, allergic reactions and increased risk of parasitic infection.  Live vaccines should be avoided while taking dupilumab. Dupilumab will also interact with certain medications such as warfarin and cyclosporine. The patient understands that monitoring is required and they must alert us or the primary physician if symptoms of infection or other concerning signs are noted. Itraconazole Counseling:  I discussed with the patient the risks of itraconazole including but not limited to liver damage, nausea/vomiting, neuropathy, and severe allergy.  The patient understands that this medication is best absorbed when taken with acidic beverages such as non-diet cola or ginger ale.  The patient understands that monitoring is required including baseline LFTs and repeat LFTs at intervals.  The patient understands that they are to contact us or the primary physician if concerning signs are noted. Minoxidil Counseling: Minoxidil is a topical medication which can increase blood flow where it is applied. It is uncertain how this medication increases hair growth. Side effects are uncommon and include stinging and allergic reactions. Cephalexin Pregnancy And Lactation Text: This medication is Pregnancy Category B and considered safe during pregnancy.  It is also excreted in breast milk but can be used safely for shorter doses. Sarecycline Counseling: Patient advised regarding possible photosensitivity and discoloration of the teeth, skin, lips, tongue and gums.  Patient instructed to avoid sunlight, if possible.  When exposed to sunlight, patients should wear protective clothing, sunglasses, and sunscreen.  The patient was instructed to call the office immediately if the following severe adverse effects occur:  hearing changes, easy bruising/bleeding, severe headache, or vision changes.  The patient verbalized understanding of the proper use and possible adverse effects of sarecycline.  All of the patient's questions and concerns were addressed. Cantharidin Counseling:  I discussed with the patient the risks of Cantharidin including but not limited to pain, redness, burning, itching, and blistering. Adbry Counseling: I discussed with the patient the risks of tralokinumab including but not limited to eye infection and irritation, cold sores, injection site reactions, worsening of asthma, allergic reactions and increased risk of parasitic infection.  Live vaccines should be avoided while taking tralokinumab. The patient understands that monitoring is required and they must alert us or the primary physician if symptoms of infection or other concerning signs are noted. VTAMA Counseling: I discussed with the patient that VTAMA is not for use in the eyes, mouth or mouth. They should call the office if they develop any signs of allergic reactions to VTAMA. The patient verbalized understanding of the proper use and possible adverse effects of VTAMA.  All of the patient's questions and concerns were addressed. Minocycline Counseling: Patient advised regarding possible photosensitivity and discoloration of the teeth, skin, lips, tongue and gums.  Patient instructed to avoid sunlight, if possible.  When exposed to sunlight, patients should wear protective clothing, sunglasses, and sunscreen.  The patient was instructed to call the office immediately if the following severe adverse effects occur:  hearing changes, easy bruising/bleeding, severe headache, or vision changes.  The patient verbalized understanding of the proper use and possible adverse effects of minocycline.  All of the patient's questions and concerns were addressed. Protopic Pregnancy And Lactation Text: This medication is Pregnancy Category C. It is unknown if this medication is excreted in breast milk when applied topically. Topical Metronidazole Pregnancy And Lactation Text: This medication is Pregnancy Category B and considered safe during pregnancy.  It is also considered safe to use while breastfeeding. Topical Retinoid counseling:  Patient advised to apply a pea-sized amount only at bedtime and wait 30 minutes after washing their face before applying.  If too drying, patient may add a non-comedogenic moisturizer. The patient verbalized understanding of the proper use and possible adverse effects of retinoids.  All of the patient's questions and concerns were addressed. Rinvoq Counseling: I discussed with the patient the risks of Rinvoq therapy including but not limited to upper respiratory tract infections, shingles, cold sores, bronchitis, nausea, cough, fever, acne, and headache. Live vaccines should be avoided.  This medication has been linked to serious infections; higher rate of mortality; malignancy and lymphoproliferative disorders; major adverse cardiovascular events; thrombosis; thrombocytopenia, anemia, and neutropenia; lipid elevations; liver enzyme elevations; and gastrointestinal perforations. Olanzapine Counseling- I discussed with the patient the common side effects of olanzapine including but are not limited to: lack of energy, dry mouth, increased appetite, sleepiness, tremor, constipation, dizziness, changes in behavior, or restlessness.  Explained that teenagers are more likely to experience headaches, abdominal pain, pain in the arms or legs, tiredness, and sleepiness.  Serious side effects include but are not limited: increased risk of death in elderly patients who are confused, have memory loss, or dementia-related psychosis; hyperglycemia; increased cholesterol and triglycerides; and weight gain. Stelara Counseling:  I discussed with the patient the risks of ustekinumab including but not limited to immunosuppression, malignancy, posterior leukoencephalopathy syndrome, and serious infections.  The patient understands that monitoring is required including a PPD at baseline and must alert us or the primary physician if symptoms of infection or other concerning signs are noted. Arava Counseling:  Patient counseled regarding adverse effects of Arava including but not limited to nausea, vomiting, abnormalities in liver function tests. Patients may develop mouth sores, rash, diarrhea, and abnormalities in blood counts. The patient understands that monitoring is required including LFTs and blood counts.  There is a rare possibility of scarring of the liver and lung problems that can occur when taking methotrexate. Persistent nausea, loss of appetite, pale stools, dark urine, cough, and shortness of breath should be reported immediately. Patient advised to discontinue Arava treatment and consult with a physician prior to attempting conception. The patient will have to undergo a treatment to eliminate Arava from the body prior to conception. Glycopyrrolate Pregnancy And Lactation Text: This medication is Pregnancy Category B and is considered safe during pregnancy. It is unknown if it is excreted breast milk. Erivedge Counseling- I discussed with the patient the risks of Erivedge including but not limited to nausea, vomiting, diarrhea, constipation, weight loss, changes in the sense of taste, decreased appetite, muscle spasms, and hair loss.  The patient verbalized understanding of the proper use and possible adverse effects of Erivedge.  All of the patient's questions and concerns were addressed. Prednisone Counseling:  I discussed with the patient the risks of prolonged use of prednisone including but not limited to weight gain, insomnia, osteoporosis, mood changes, diabetes, susceptibility to infection, glaucoma and high blood pressure.  In cases where prednisone use is prolonged, patients should be monitored with blood pressure checks, serum glucose levels and an eye exam.  Additionally, the patient may need to be placed on GI prophylaxis, PCP prophylaxis, and calcium and vitamin D supplementation and/or a bisphosphonate.  The patient verbalized understanding of the proper use and the possible adverse effects of prednisone.  All of the patient's questions and concerns were addressed. Libtayo Counseling- I discussed with the patient the risks of Libtayo including but not limited to nausea, vomiting, diarrhea, and bone or muscle pain.  The patient verbalized understanding of the proper use and possible adverse effects of Libtayo.  All of the patient's questions and concerns were addressed. Bexarotene Counseling:  I discussed with the patient the risks of bexarotene including but not limited to hair loss, dry lips/skin/eyes, liver abnormalities, hyperlipidemia, pancreatitis, depression/suicidal ideation, photosensitivity, drug rash/allergic reactions, hypothyroidism, anemia, leukopenia, infection, cataracts, and teratogenicity.  Patient understands that they will need regular blood tests to check lipid profile, liver function tests, white blood cell count, thyroid function tests and pregnancy test if applicable. Cellcept Counseling:  I discussed with the patient the risks of mycophenolate mofetil including but not limited to infection/immunosuppression, GI upset, hypokalemia, hypercholesterolemia, bone marrow suppression, lymphoproliferative disorders, malignancy, GI ulceration/bleed/perforation, colitis, interstitial lung disease, kidney failure, progressive multifocal leukoencephalopathy, and birth defects.  The patient understands that monitoring is required including a baseline creatinine and regular CBC testing. In addition, patient must alert us immediately if symptoms of infection or other concerning signs are noted. Doxepin Pregnancy And Lactation Text: This medication is Pregnancy Category C and it isn't known if it is safe during pregnancy. It is also excreted in breast milk and breast feeding isn't recommended. Aklief Pregnancy And Lactation Text: It is unknown if this medication is safe to use during pregnancy.  It is unknown if this medication is excreted in breast milk.  Breastfeeding women should use the topical cream on the smallest area of the skin for the shortest time needed while breastfeeding.  Do not apply to nipple and areola. Propranolol Pregnancy And Lactation Text: This medication is Pregnancy Category C and it isn't known if it is safe during pregnancy. It is excreted in breast milk. Dupixent Pregnancy And Lactation Text: This medication likely crosses the placenta but the risk for the fetus is uncertain. This medication is excreted in breast milk. Cantharidin Pregnancy And Lactation Text: This medication has not been proven safe during pregnancy. It is unknown if this medication is excreted in breast milk. Finasteride Female Counseling: Finasteride Counseling:  I discussed with the patient the risks of use of finasteride including but not limited to decreased libido and sexual dysfunction. Explained the teratogenic nature of the medication and stressed the importance of not getting pregnant during treatment. All of the patient's questions and concerns were addressed. Sski Pregnancy And Lactation Text: This medication is Pregnancy Category D and isn't considered safe during pregnancy. It is excreted in breast milk. Eucrisa Counseling: Patient may experience a mild burning sensation during topical application. Eucrisa is not approved in children less than 2 years of age. Topical Steroids Counseling: I discussed with the patient that prolonged use of topical steroids can result in the increased appearance of superficial blood vessels (telangiectasias), lightening (hypopigmentation) and thinning of the skin (atrophy).  Patient understands to avoid using high potency steroids in skin folds, the groin or the face.  The patient verbalized understanding of the proper use and possible adverse effects of topical steroids.  All of the patient's questions and concerns were addressed. Rituxan Counseling:  I discussed with the patient the risks of Rituxan infusions. Side effects can include infusion reactions, severe drug rashes including mucocutaneous reactions, reactivation of latent hepatitis and other infections and rarely progressive multifocal leukoencephalopathy.  All of the patient's questions and concerns were addressed. Sarecycline Pregnancy And Lactation Text: This medication is Pregnancy Category D and not consider safe during pregnancy. It is also excreted in breast milk. 5-Fu Counseling: 5-Fluorouracil Counseling:  I discussed with the patient the risks of 5-fluorouracil including but not limited to erythema, scaling, itching, weeping, crusting, and pain. Clindamycin Counseling: I counseled the patient regarding use of clindamycin as an antibiotic for prophylactic and/or therapeutic purposes. Clindamycin is active against numerous classes of bacteria, including skin bacteria. Side effects may include nausea, diarrhea, gastrointestinal upset, rash, hives, yeast infections, and in rare cases, colitis. Qbrexza Counseling:  I discussed with the patient the risks of Qbrexza including but not limited to headache, mydriasis, blurred vision, dry eyes, nasal dryness, dry mouth, dry throat, dry skin, urinary hesitation, and constipation.  Local skin reactions including erythema, burning, stinging, and itching can also occur. Minoxidil Pregnancy And Lactation Text: This medication has not been assigned a Pregnancy Risk Category but animal studies failed to show danger with the topical medication. It is unknown if the medication is excreted in breast milk. Rinvoq Pregnancy And Lactation Text: Based on animal studies, Rinvoq may cause embryo-fetal harm when administered to pregnant women.  The medication should not be used in pregnancy.  Breastfeeding is not recommended during treatment and for 6 days after the last dose. Arava Pregnancy And Lactation Text: This medication is Pregnancy Category X and is absolutely contraindicated during pregnancy. It is unknown if it is excreted in breast milk. Hydroxychloroquine Counseling:  I discussed with the patient that a baseline ophthalmologic exam is needed at the start of therapy and every year thereafter while on therapy. A CBC may also be warranted for monitoring.  The side effects of this medication were discussed with the patient, including but not limited to agranulocytosis, aplastic anemia, seizures, rashes, retinopathy, and liver toxicity. Patient instructed to call the office should any adverse effect occur.  The patient verbalized understanding of the proper use and possible adverse effects of Plaquenil.  All the patient's questions and concerns were addressed. Adbry Pregnancy And Lactation Text: It is unknown if this medication will adversely affect pregnancy or breast feeding. Vtama Pregnancy And Lactation Text: It is unknown if this medication can cause problems during pregnancy and breastfeeding. Thalidomide Counseling: I discussed with the patient the risks of thalidomide including but not limited to birth defects, anxiety, weakness, chest pain, dizziness, cough and severe allergy. Enbrel Counseling:  I discussed with the patient the risks of etanercept including but not limited to myelosuppression, immunosuppression, autoimmune hepatitis, demyelinating diseases, lymphoma, and infections.  The patient understands that monitoring is required including a PPD at baseline and must alert us or the primary physician if symptoms of infection or other concerning signs are noted. Libtayo Pregnancy And Lactation Text: This medication is contraindicated in pregnancy and when breast feeding. Olanzapine Pregnancy And Lactation Text: This medication is pregnancy category C.   There are no adequate and well controlled trials with olanzapine in pregnant females.  Olanzapine should be used during pregnancy only if the potential benefit justifies the potential risk to the fetus.   In a study in lactating healthy women, olanzapine was excreted in breast milk.  It is recommended that women taking olanzapine should not breast feed. Bexarotene Pregnancy And Lactation Text: This medication is Pregnancy Category X and should not be given to women who are pregnant or may become pregnant. This medication should not be used if you are breast feeding. Mirvaso Counseling: Mirvaso is a topical medication which can decrease superficial blood flow where applied. Side effects are uncommon and include stinging, redness and allergic reactions. Clindamycin Pregnancy And Lactation Text: This medication can be used in pregnancy if certain situations. Clindamycin is also present in breast milk. Ketoconazole Counseling:   Patient counseled regarding improving absorption with orange juice.  Adverse effects include but are not limited to breast enlargement, headache, diarrhea, nausea, upset stomach, liver function test abnormalities, taste disturbance, and stomach pain.  There is a rare possibility of liver failure that can occur when taking ketoconazole. The patient understands that monitoring of LFTs may be required, especially at baseline. The patient verbalized understanding of the proper use and possible adverse effects of ketoconazole.  All of the patient's questions and concerns were addressed. Finasteride Pregnancy And Lactation Text: This medication is absolutely contraindicated during pregnancy. It is unknown if it is excreted in breast milk. Qbrexza Pregnancy And Lactation Text: There is no available data on Qbrexza use in pregnant women.  There is no available data on Qbrexza use in lactation. Hydroxyzine Counseling: Patient advised that the medication is sedating and not to drive a car after taking this medication.  Patient informed of potential adverse effects including but not limited to dry mouth, urinary retention, and blurry vision.  The patient verbalized understanding of the proper use and possible adverse effects of hydroxyzine.  All of the patient's questions and concerns were addressed. Azelaic Acid Counseling: Patient counseled that medicine may cause skin irritation and to avoid applying near the eyes.  In the event of skin irritation, the patient was advised to reduce the amount of the drug applied or use it less frequently.   The patient verbalized understanding of the proper use and possible adverse effects of azelaic acid.  All of the patient's questions and concerns were addressed. SSKI Counseling:  I discussed with the patient the risks of SSKI including but not limited to thyroid abnormalities, metallic taste, GI upset, fever, headache, acne, arthralgias, paraesthesias, lymphadenopathy, easy bleeding, arrhythmias, and allergic reaction. Tetracycline Counseling: Patient counseled regarding possible photosensitivity and increased risk for sunburn.  Patient instructed to avoid sunlight, if possible.  When exposed to sunlight, patients should wear protective clothing, sunglasses, and sunscreen.  The patient was instructed to call the office immediately if the following severe adverse effects occur:  hearing changes, easy bruising/bleeding, severe headache, or vision changes.  The patient verbalized understanding of the proper use and possible adverse effects of tetracycline.  All of the patient's questions and concerns were addressed. Patient understands to avoid pregnancy while on therapy due to potential birth defects. Tazorac Counseling:  Patient advised that medication is irritating and drying.  Patient may need to apply sparingly and wash off after an hour before eventually leaving it on overnight.  The patient verbalized understanding of the proper use and possible adverse effects of tazorac.  All of the patient's questions and concerns were addressed. Topical Steroids Applications Pregnancy And Lactation Text: Most topical steroids are considered safe to use during pregnancy and lactation.  Any topical steroid applied to the breast or nipple should be washed off before breastfeeding. Bimzelx Counseling:  I discussed with the patient the risks of Bimzelx including but not limited to depression, immunosuppression, allergic reactions and infections.  The patient understands that monitoring is required including a PPD at baseline and must alert us or the primary physician if symptoms of infection or other concerning signs are noted. Sotyktu Counseling:  I discussed the most common side effects of Sotyktu including: common cold, sore throat, sinus infections, cold sores, canker sores, folliculitis, and acne.? I also discussed more serious side effects of Sotyktu including but not limited to: serious allergic reactions; increased risk for infections such as TB; cancers such as lymphomas; rhabdomyolysis and elevated CPK; and elevated triglycerides and liver enzymes.? Rituxan Pregnancy And Lactation Text: This medication is Pregnancy Category C and it isn't know if it is safe during pregnancy. It is unknown if this medication is excreted in breast milk but similar antibodies are known to be excreted. Isotretinoin Counseling: Patient should get monthly blood tests, not donate blood, not drive at night if vision affected, not share medication, and not undergo elective surgery for 6 months after tx completed. Side effects reviewed, pt to contact office should one occur. Clofazimine Counseling:  I discussed with the patient the risks of clofazimine including but not limited to skin and eye pigmentation, liver damage, nausea/vomiting, gastrointestinal bleeding and allergy. Oral Minoxidil Counseling- I discussed with the patient the risks of oral minoxidil including but not limited to shortness of breath, swelling of the feet or ankles, dizziness, lightheadedness, unwanted hair growth and allergic reaction.  The patient verbalized understanding of the proper use and possible adverse effects of oral minoxidil.  All of the patient's questions and concerns were addressed. Quinolones Counseling:  I discussed with the patient the risks of fluoroquinolones including but not limited to GI upset, allergic reaction, drug rash, diarrhea, dizziness, photosensitivity, yeast infections, liver function test abnormalities, tendonitis/tendon rupture. Zoryve Counseling:  I discussed with the patient that Zoryve is not for use in the eyes, mouth or vagina. The most commonly reported side effects include diarrhea, headache, insomnia, application site pain, upper respiratory tract infections, and urinary tract infections.  All of the patient's questions and concerns were addressed. Hydroxychloroquine Pregnancy And Lactation Text: This medication has been shown to cause fetal harm but it isn't assigned a Pregnancy Risk Category. There are small amounts excreted in breast milk. Odomzo Counseling- I discussed with the patient the risks of Odomzo including but not limited to nausea, vomiting, diarrhea, constipation, weight loss, changes in the sense of taste, decreased appetite, muscle spasms, and hair loss.  The patient verbalized understanding of the proper use and possible adverse effects of Odomzo.  All of the patient's questions and concerns were addressed. Hydroquinone Counseling:  Patient advised that medication may result in skin irritation, lightening (hypopigmentation), dryness, and burning.  In the event of skin irritation, the patient was advised to reduce the amount of the drug applied or use it less frequently.  Rarely, spots that are treated with hydroquinone can become darker (pseudoochronosis).  Should this occur, patient instructed to stop medication and call the office. The patient verbalized understanding of the proper use and possible adverse effects of hydroquinone.  All of the patient's questions and concerns were addressed. Taltz Counseling: I discussed with the patient the risks of ixekizumab including but not limited to immunosuppression, serious infections, worsening of inflammatory bowel disease and drug reactions.  The patient understands that monitoring is required including a PPD at baseline and must alert us or the primary physician if symptoms of infection or other concerning signs are noted. Doxycycline Counseling:  Patient counseled regarding possible photosensitivity and increased risk for sunburn.  Patient instructed to avoid sunlight, if possible.  When exposed to sunlight, patients should wear protective clothing, sunglasses, and sunscreen.  The patient was instructed to call the office immediately if the following severe adverse effects occur:  hearing changes, easy bruising/bleeding, severe headache, or vision changes.  The patient verbalized understanding of the proper use and possible adverse effects of doxycycline.  All of the patient's questions and concerns were addressed. Birth Control Pills Counseling: Birth Control Pill Counseling: I discussed with the patient the potential side effects of OCPs including but not limited to increased risk of stroke, heart attack, thrombophlebitis, deep venous thrombosis, hepatic adenomas, breast changes, GI upset, headaches, and depression.  The patient verbalized understanding of the proper use and possible adverse effects of OCPs. All of the patient's questions and concerns were addressed. Rhofade Counseling: Rhofade is a topical medication which can decrease superficial blood flow where applied. Side effects are uncommon and include stinging, redness and allergic reactions. Cyclophosphamide Counseling:  I discussed with the patient the risks of cyclophosphamide including but not limited to hair loss, hormonal abnormalities, decreased fertility, abdominal pain, diarrhea, nausea and vomiting, bone marrow suppression and infection. The patient understands that monitoring is required while taking this medication. Cibinqo Counseling: I discussed with the patient the risks of Cibinqo therapy including but not limited to common cold, nausea, headache, cold sores, increased blood CPK levels, dizziness, UTIs, fatigue, acne, and vomitting. Live vaccines should be avoided.  This medication has been linked to serious infections; higher rate of mortality; malignancy and lymphoproliferative disorders; major adverse cardiovascular events; thrombosis; thrombocytopenia and lymphopenia; lipid elevations; and retinal detachment. Hydroxyzine Pregnancy And Lactation Text: This medication is not safe during pregnancy and should not be taken. It is also excreted in breast milk and breast feeding isn't recommended. Ketoconazole Pregnancy And Lactation Text: This medication is Pregnancy Category C and it isn't know if it is safe during pregnancy. It is also excreted in breast milk and breast feeding isn't recommended. Sotyktu Pregnancy And Lactation Text: There is insufficient data to evaluate whether or not Sotyktu is safe to use during pregnancy.? ?It is not known if Sotyktu passes into breast milk and whether or not it is safe to use when breastfeeding.?? Siliq Counseling:  I discussed with the patient the risks of Siliq including but not limited to new or worsening depression, suicidal thoughts and behavior, immunosuppression, malignancy, posterior leukoencephalopathy syndrome, and serious infections.  The patient understands that monitoring is required including a PPD at baseline and must alert us or the primary physician if symptoms of infection or other concerning signs are noted. There is also a special program designed to monitor depression which is required with Siliq. Low Dose Naltrexone Counseling- I discussed with the patient the potential risks and side effects of low dose naltrexone including but not limited to: more vivid dreams, headaches, nausea, vomiting, abdominal pain, fatigue, dizziness, and anxiety. Tazorac Pregnancy And Lactation Text: This medication is not safe during pregnancy. It is unknown if this medication is excreted in breast milk. Drysol Counseling:  I discussed with the patient the risks of drysol/aluminum chloride including but not limited to skin rash, itching, irritation, burning. Topical Sulfur Applications Counseling: Topical Sulfur Counseling: Patient counseled that this medication may cause skin irritation or allergic reactions.  In the event of skin irritation, the patient was advised to reduce the amount of the drug applied or use it less frequently.   The patient verbalized understanding of the proper use and possible adverse effects of topical sulfur application.  All of the patient's questions and concerns were addressed. Humira Counseling:  I discussed with the patient the risks of adalimumab including but not limited to myelosuppression, immunosuppression, autoimmune hepatitis, demyelinating diseases, lymphoma, and serious infections.  The patient understands that monitoring is required including a PPD at baseline and must alert us or the primary physician if symptoms of infection or other concerning signs are noted. Oral Minoxidil Pregnancy And Lactation Text: This medication should only be used when clearly needed if you are pregnant, attempting to become pregnant or breast feeding. Isotretinoin Pregnancy And Lactation Text: This medication is Pregnancy Category X and is considered extremely dangerous during pregnancy. It is unknown if it is excreted in breast milk. Bimzelx Pregnancy And Lactation Text: This medication crosses the placenta and the safety is uncertain during pregnancy. It is unknown if this medication is present in breast milk. Azithromycin Counseling:  I discussed with the patient the risks of azithromycin including but not limited to GI upset, allergic reaction, drug rash, diarrhea, and yeast infections. Cibinqo Pregnancy And Lactation Text: It is unknown if this medication will adversely affect pregnancy or breast feeding.  You should not take this medication if you are currently pregnant or planning a pregnancy or while breastfeeding. Benzoyl Peroxide Counseling: Patient counseled that medicine may cause skin irritation and bleach clothing.  In the event of skin irritation, the patient was advised to reduce the amount of the drug applied or use it less frequently.   The patient verbalized understanding of the proper use and possible adverse effects of benzoyl peroxide.  All of the patient's questions and concerns were addressed. Tranexamic Acid Counseling:  Patient advised of the small risk of bleeding problems with tranexamic acid. They were also instructed to call if they developed any nausea, vomiting or diarrhea. All of the patient's questions and concerns were addressed.

## 2024-06-07 NOTE — DIETITIAN INITIAL EVALUATION ADULT. - NAME AND PHONE
Last OV 05/16/2024   Upcoming OV 06/13/2024  CMP 03/2024  CBC 03/2024  EKG 03/2024   [place] [id number]
